# Patient Record
Sex: FEMALE | Race: BLACK OR AFRICAN AMERICAN | NOT HISPANIC OR LATINO | URBAN - METROPOLITAN AREA
[De-identification: names, ages, dates, MRNs, and addresses within clinical notes are randomized per-mention and may not be internally consistent; named-entity substitution may affect disease eponyms.]

---

## 2019-02-24 ENCOUNTER — INPATIENT (INPATIENT)
Facility: HOSPITAL | Age: 69
LOS: 2 days | Discharge: HOME | End: 2019-02-27
Attending: STUDENT IN AN ORGANIZED HEALTH CARE EDUCATION/TRAINING PROGRAM | Admitting: STUDENT IN AN ORGANIZED HEALTH CARE EDUCATION/TRAINING PROGRAM
Payer: COMMERCIAL

## 2019-02-24 VITALS
OXYGEN SATURATION: 99 % | TEMPERATURE: 98 F | RESPIRATION RATE: 16 BRPM | HEART RATE: 104 BPM | SYSTOLIC BLOOD PRESSURE: 156 MMHG | DIASTOLIC BLOOD PRESSURE: 81 MMHG

## 2019-02-24 DIAGNOSIS — R55 SYNCOPE AND COLLAPSE: ICD-10-CM

## 2019-02-24 LAB
ALBUMIN SERPL ELPH-MCNC: 3.7 G/DL — SIGNIFICANT CHANGE UP (ref 3.3–5)
ALP SERPL-CCNC: 65 U/L — SIGNIFICANT CHANGE UP (ref 40–120)
ALT FLD-CCNC: 3 U/L — LOW (ref 4–33)
ANION GAP SERPL CALC-SCNC: 15 MMO/L — HIGH (ref 7–14)
APPEARANCE UR: SIGNIFICANT CHANGE UP
APTT BLD: 27.1 SEC — LOW (ref 27.5–36.3)
AST SERPL-CCNC: 13 U/L — SIGNIFICANT CHANGE UP (ref 4–32)
BACTERIA # UR AUTO: HIGH
BASOPHILS # BLD AUTO: 0.02 K/UL — SIGNIFICANT CHANGE UP (ref 0–0.2)
BASOPHILS NFR BLD AUTO: 0.2 % — SIGNIFICANT CHANGE UP (ref 0–2)
BILIRUB SERPL-MCNC: 0.2 MG/DL — SIGNIFICANT CHANGE UP (ref 0.2–1.2)
BILIRUB UR-MCNC: NEGATIVE — SIGNIFICANT CHANGE UP
BLD GP AB SCN SERPL QL: NEGATIVE — SIGNIFICANT CHANGE UP
BLOOD UR QL VISUAL: HIGH
BUN SERPL-MCNC: 14 MG/DL — SIGNIFICANT CHANGE UP (ref 7–23)
CALCIUM SERPL-MCNC: 10 MG/DL — SIGNIFICANT CHANGE UP (ref 8.4–10.5)
CHLORIDE SERPL-SCNC: 106 MMOL/L — SIGNIFICANT CHANGE UP (ref 98–107)
CO2 SERPL-SCNC: 23 MMOL/L — SIGNIFICANT CHANGE UP (ref 22–31)
COLOR SPEC: YELLOW — SIGNIFICANT CHANGE UP
CREAT SERPL-MCNC: 1.04 MG/DL — SIGNIFICANT CHANGE UP (ref 0.5–1.3)
EOSINOPHIL # BLD AUTO: 0.05 K/UL — SIGNIFICANT CHANGE UP (ref 0–0.5)
EOSINOPHIL NFR BLD AUTO: 0.5 % — SIGNIFICANT CHANGE UP (ref 0–6)
EPI CELLS # UR: SIGNIFICANT CHANGE UP
GLUCOSE SERPL-MCNC: 93 MG/DL — SIGNIFICANT CHANGE UP (ref 70–99)
GLUCOSE UR-MCNC: NEGATIVE — SIGNIFICANT CHANGE UP
HCT VFR BLD CALC: 26.5 % — LOW (ref 34.5–45)
HGB BLD-MCNC: 7.7 G/DL — LOW (ref 11.5–15.5)
IMM GRANULOCYTES NFR BLD AUTO: 0.8 % — SIGNIFICANT CHANGE UP (ref 0–1.5)
INR BLD: 1.28 — HIGH (ref 0.88–1.17)
KETONES UR-MCNC: SIGNIFICANT CHANGE UP
LEUKOCYTE ESTERASE UR-ACNC: HIGH
LYMPHOCYTES # BLD AUTO: 1.68 K/UL — SIGNIFICANT CHANGE UP (ref 1–3.3)
LYMPHOCYTES # BLD AUTO: 15.9 % — SIGNIFICANT CHANGE UP (ref 13–44)
MCHC RBC-ENTMCNC: 23.1 PG — LOW (ref 27–34)
MCHC RBC-ENTMCNC: 29.1 % — LOW (ref 32–36)
MCV RBC AUTO: 79.3 FL — LOW (ref 80–100)
MONOCYTES # BLD AUTO: 0.73 K/UL — SIGNIFICANT CHANGE UP (ref 0–0.9)
MONOCYTES NFR BLD AUTO: 6.9 % — SIGNIFICANT CHANGE UP (ref 2–14)
NEUTROPHILS # BLD AUTO: 8 K/UL — HIGH (ref 1.8–7.4)
NEUTROPHILS NFR BLD AUTO: 75.7 % — SIGNIFICANT CHANGE UP (ref 43–77)
NITRITE UR-MCNC: POSITIVE — SIGNIFICANT CHANGE UP
NRBC # FLD: 0 K/UL — LOW (ref 25–125)
PH UR: 6 — SIGNIFICANT CHANGE UP (ref 5–8)
PLATELET # BLD AUTO: 525 K/UL — HIGH (ref 150–400)
PMV BLD: 9.6 FL — SIGNIFICANT CHANGE UP (ref 7–13)
POTASSIUM SERPL-MCNC: 3.8 MMOL/L — SIGNIFICANT CHANGE UP (ref 3.5–5.3)
POTASSIUM SERPL-SCNC: 3.8 MMOL/L — SIGNIFICANT CHANGE UP (ref 3.5–5.3)
PROT SERPL-MCNC: 8.5 G/DL — HIGH (ref 6–8.3)
PROT UR-MCNC: 100 — HIGH
PROTHROM AB SERPL-ACNC: 14.7 SEC — HIGH (ref 9.8–13.1)
RBC # BLD: 3.34 M/UL — LOW (ref 3.8–5.2)
RBC # FLD: 18.6 % — HIGH (ref 10.3–14.5)
RBC CASTS # UR COMP ASSIST: >50 — HIGH (ref 0–?)
RH IG SCN BLD-IMP: POSITIVE — SIGNIFICANT CHANGE UP
RH IG SCN BLD-IMP: POSITIVE — SIGNIFICANT CHANGE UP
SODIUM SERPL-SCNC: 144 MMOL/L — SIGNIFICANT CHANGE UP (ref 135–145)
SP GR SPEC: 1.02 — SIGNIFICANT CHANGE UP (ref 1–1.04)
UROBILINOGEN FLD QL: NORMAL — SIGNIFICANT CHANGE UP
WBC # BLD: 10.56 K/UL — HIGH (ref 3.8–10.5)
WBC # FLD AUTO: 10.56 K/UL — HIGH (ref 3.8–10.5)
WBC UR QL: HIGH (ref 0–?)

## 2019-02-24 PROCEDURE — 74174 CTA ABD&PLVS W/CONTRAST: CPT | Mod: 26

## 2019-02-24 RX ORDER — SODIUM CHLORIDE 9 MG/ML
1000 INJECTION, SOLUTION INTRAVENOUS ONCE
Qty: 0 | Refills: 0 | Status: COMPLETED | OUTPATIENT
Start: 2019-02-24 | End: 2019-02-24

## 2019-02-24 RX ORDER — CEFTRIAXONE 500 MG/1
1 INJECTION, POWDER, FOR SOLUTION INTRAMUSCULAR; INTRAVENOUS ONCE
Qty: 0 | Refills: 0 | Status: COMPLETED | OUTPATIENT
Start: 2019-02-24 | End: 2019-02-24

## 2019-02-24 RX ORDER — ACETAMINOPHEN 500 MG
650 TABLET ORAL ONCE
Qty: 0 | Refills: 0 | Status: COMPLETED | OUTPATIENT
Start: 2019-02-24 | End: 2019-02-24

## 2019-02-24 RX ADMIN — CEFTRIAXONE 100 GRAM(S): 500 INJECTION, POWDER, FOR SOLUTION INTRAMUSCULAR; INTRAVENOUS at 21:13

## 2019-02-24 RX ADMIN — SODIUM CHLORIDE 1000 MILLILITER(S): 9 INJECTION, SOLUTION INTRAVENOUS at 19:45

## 2019-02-24 NOTE — ED PROVIDER NOTE - CLINICAL SUMMARY MEDICAL DECISION MAKING FREE TEXT BOX
67yo f w hx fibroids, daily smoker, otherwise no pmh here for syncopal episode. Appears well w nl ekg, no cardiac hx. Most likely related to low Hb in setting of known fibroids recently, however given 67yo pt hypertensive here w abd pain + tenderness a/w syncope, will check CTA abd to r/o AAA, also labs, hydrate, r/a 67yo f w hx fibroids, daily smoker, otherwise no pmh here for syncopal episode. Appears well w nl ekg, no cardiac hx. Most likely related to low Hb in setting of known fibroids recently, however given 67yo pt hypertensive here w abd pain + tenderness a/w syncope, will check CTA abd to r/o AAA, also labs, hydrate, r/a  See progress notes for more of clinical course

## 2019-02-24 NOTE — ED PROVIDER NOTE - PROGRESS NOTE DETAILS
Elizabeth Goldberger PGY-2: Hb 7.7, however pt tachycardic to 110s and had syncopal episode likely related to blood loss. As such will transfuse 1 u and adm. Also put call out to pt's gyn Sivakumar, awaiting callback Elizabeth Goldberger PGY-2: CTA showing suggestion of malignant process of uterus and/or cervix, not fibroids as pt had previously thought. When asked pt state never had formal dx fibroids but was told sx likely 2/2 to that pending formal diagnosis. Discussed findings of ct w pt

## 2019-02-24 NOTE — ED PROVIDER NOTE - CARE PLAN
Principal Discharge DX:	Syncope  Secondary Diagnosis:	UTI (urinary tract infection)  Secondary Diagnosis:	Anemia

## 2019-02-24 NOTE — ED PROVIDER NOTE - OBJECTIVE STATEMENT
68f w hx fibroids here for syncopal episode. Pt was standing at a restaurant after not having eaten much during the day when felt suddenly lightheaded and lost consciousness. Was witnessed by family, lasted only a few seconds. Fell down but w/o head trauma. No preceding cp/sob, no subsequent h/a, visual changes or vomiting. No previous hx syncope. No cardiac hx. Earlier today visited gyn to follow up fibroids, was told had low Hb ~8. States has been bleeding ~1 pad daily for past month w/o recent increase in bleeding. No dysuria or hematuria, no bleeding from other sites. Has also had abd pain recently which has been attributed to her fibroid, still present currently, diffusely throughout abd. Has been taking norethindrone for last 10 days w/o obvious effect. No n/v/d, no fever, no surg hx. Has not had any recent abd/pelvic imaging. 68f w hx fibroids here for syncopal episode. Pt was standing at a restaurant after not having eaten much during the day when felt suddenly lightheaded and lost consciousness. Was witnessed by family, lasted only a few seconds. Fell down but w/o head trauma. No preceding cp/sob, no subsequent h/a, visual changes or vomiting. No previous hx syncope. No cardiac hx. Earlier today visited gyn to follow up fibroids, was told had low Hb ~8. States has been bleeding ~1 pad daily for past month w/o recent increase in bleeding. No dysuria or hematuria, no bleeding from other sites. Has also had abd pain recently which has been attributed to her fibroid, still present currently, diffusely throughout abd. Has been taking norethindrone for last 10 days w/o obvious effect. No n/v/d, no fever, no surg hx. Has not had any recent abd/pelvic imaging.    PMJULIANN @ [facility]  gyn- Mamie Shaver

## 2019-02-24 NOTE — ED ADULT NURSE NOTE - CHIEF COMPLAINT QUOTE
Pt from [facility] restaurant, c/o witnessed syncopal episode.  Pt denies head trauma, mild lower abdominal discomfort.  PMHx fibroids.

## 2019-02-24 NOTE — ED ADULT NURSE NOTE - OBJECTIVE STATEMENT
pt received in room, 27, A&Ox3, c/o syncopal episode at restaurant. according to pt she felt lightheaded & slid down to the floor with the help of restaurant patrons, pt denies head trauma. pt states shes anemic and has been bleeding vaginally due to fibroids x1 month, going through 1-2 depends diapers a day. pt states she has not eaten or drank anything all day today, pt breathing even and unlabored, denies n/v/d, difficulty breathing, dizziness, headache, fever, cough, chills, numbness, pt has equal strength bilaterally in Upper & lower extremities, neg facial droop, neg slurred speech. 20G I Vplaced in L AC, labs sent, NSR on CM, will continue to monitor.

## 2019-02-24 NOTE — ED ADULT TRIAGE NOTE - CHIEF COMPLAINT QUOTE
Pt from [facility] restaurant, c/o witnessed syncopal episode.  Pt denies head trauma, mild lower abdominal discomfort.  PMHx fibroids.

## 2019-02-24 NOTE — ED ADULT NURSE NOTE - NSIMPLEMENTINTERV_GEN_ALL_ED
Implemented All Fall Risk Interventions:  [place] to call system. Call bell, personal items and telephone within reach. Instruct patient to call for assistance. Room bathroom lighting operational. Non-slip footwear when patient is off stretcher. Physically safe environment: no spills, clutter or unnecessary equipment. Stretcher in lowest position, wheels locked, appropriate side rails in place. Provide visual cue, wrist band, yellow gown, etc. Monitor gait and stability. Monitor for mental status changes and reorient to person, place, and time. Review medications for side effects contributing to fall risk. Reinforce activity limits and safety measures with patient and family.

## 2019-02-24 NOTE — ED PROVIDER NOTE - ATTENDING CONTRIBUTION TO CARE
Gong: I have seen and examined the patient face to face, have reviewed and addended the HPI, PE and a/p as necessary.     67 yo F  h/o fibroids, a/w syncope at a restaurant.  Pt reported feeling lightheaded and lost consciousness witnessed by family, no head trauma, no cp, sob, headache, vomiting.  Pt noted to have anemia with dysfunction uterine bleeding.  Has been taking norethindrone for last 10 days w/o obvious effect. No n/v/d, no fever, no surg hx. Has not had any recent abd/pelvic imaging.    GEN - NAD; well appearing; A+O x3; non-toxic appearing CARD -s1s2, RRR, no M,G,R; PULM - CTA b/l, symmetric breath sounds; ABD:  +BS, TTP in LLQ, no guarding, no rebound, no masses; BACK: no CVA tenderness, Normal  spine; EXT: symmetric pulses, 2+ dp, capillary refill < 2 seconds, no clubbing, no cyanosis, no edema     67 yo F a/w syncope possibly related to anemia, with LLQ pain, EKG unremarkable for syncope, with persistent tachycardia, will ct abd pelvis to eval LLQ pain, check cbc to assess for worsening anemia.    Likely admit for syncope work up, follow up ct scan.

## 2019-02-25 DIAGNOSIS — R55 SYNCOPE AND COLLAPSE: ICD-10-CM

## 2019-02-25 DIAGNOSIS — R93.89 ABNORMAL FINDINGS ON DIAGNOSTIC IMAGING OF OTHER SPECIFIED BODY STRUCTURES: ICD-10-CM

## 2019-02-25 DIAGNOSIS — N30.01 ACUTE CYSTITIS WITH HEMATURIA: ICD-10-CM

## 2019-02-25 DIAGNOSIS — N39.0 URINARY TRACT INFECTION, SITE NOT SPECIFIED: ICD-10-CM

## 2019-02-25 DIAGNOSIS — D50.9 IRON DEFICIENCY ANEMIA, UNSPECIFIED: ICD-10-CM

## 2019-02-25 DIAGNOSIS — D47.3 ESSENTIAL (HEMORRHAGIC) THROMBOCYTHEMIA: ICD-10-CM

## 2019-02-25 DIAGNOSIS — Z29.9 ENCOUNTER FOR PROPHYLACTIC MEASURES, UNSPECIFIED: ICD-10-CM

## 2019-02-25 DIAGNOSIS — N93.9 ABNORMAL UTERINE AND VAGINAL BLEEDING, UNSPECIFIED: ICD-10-CM

## 2019-02-25 DIAGNOSIS — R59.0 LOCALIZED ENLARGED LYMPH NODES: ICD-10-CM

## 2019-02-25 DIAGNOSIS — Z98.890 OTHER SPECIFIED POSTPROCEDURAL STATES: Chronic | ICD-10-CM

## 2019-02-25 LAB
ANION GAP SERPL CALC-SCNC: 14 MMO/L — SIGNIFICANT CHANGE UP (ref 7–14)
BASOPHILS # BLD AUTO: 0.03 K/UL — SIGNIFICANT CHANGE UP (ref 0–0.2)
BASOPHILS NFR BLD AUTO: 0.4 % — SIGNIFICANT CHANGE UP (ref 0–2)
BUN SERPL-MCNC: 13 MG/DL — SIGNIFICANT CHANGE UP (ref 7–23)
CALCIUM SERPL-MCNC: 9.4 MG/DL — SIGNIFICANT CHANGE UP (ref 8.4–10.5)
CEA SERPL-MCNC: 1.1 NG/ML — SIGNIFICANT CHANGE UP (ref 1–3.8)
CHLORIDE SERPL-SCNC: 104 MMOL/L — SIGNIFICANT CHANGE UP (ref 98–107)
CO2 SERPL-SCNC: 25 MMOL/L — SIGNIFICANT CHANGE UP (ref 22–31)
CREAT SERPL-MCNC: 1.06 MG/DL — SIGNIFICANT CHANGE UP (ref 0.5–1.3)
EOSINOPHIL # BLD AUTO: 0.1 K/UL — SIGNIFICANT CHANGE UP (ref 0–0.5)
EOSINOPHIL NFR BLD AUTO: 1.2 % — SIGNIFICANT CHANGE UP (ref 0–6)
FERRITIN SERPL-MCNC: 41.18 NG/ML — SIGNIFICANT CHANGE UP (ref 15–150)
GLUCOSE SERPL-MCNC: 115 MG/DL — HIGH (ref 70–99)
HCT VFR BLD CALC: 26.7 % — LOW (ref 34.5–45)
HCV AB S/CO SERPL IA: 0.09 S/CO — SIGNIFICANT CHANGE UP (ref 0–0.79)
HCV AB SERPL-IMP: SIGNIFICANT CHANGE UP
HGB BLD-MCNC: 8 G/DL — LOW (ref 11.5–15.5)
IMM GRANULOCYTES NFR BLD AUTO: 0.4 % — SIGNIFICANT CHANGE UP (ref 0–1.5)
IRON SATN MFR SERPL: 18 UG/DL — LOW (ref 30–160)
IRON SATN MFR SERPL: 248 UG/DL — SIGNIFICANT CHANGE UP (ref 140–530)
LACTATE SERPL-SCNC: 1.4 MMOL/L — SIGNIFICANT CHANGE UP (ref 0.5–2)
LYMPHOCYTES # BLD AUTO: 2.02 K/UL — SIGNIFICANT CHANGE UP (ref 1–3.3)
LYMPHOCYTES # BLD AUTO: 24.2 % — SIGNIFICANT CHANGE UP (ref 13–44)
MAGNESIUM SERPL-MCNC: 1.9 MG/DL — SIGNIFICANT CHANGE UP (ref 1.6–2.6)
MCHC RBC-ENTMCNC: 23.7 PG — LOW (ref 27–34)
MCHC RBC-ENTMCNC: 30 % — LOW (ref 32–36)
MCV RBC AUTO: 79.2 FL — LOW (ref 80–100)
MONOCYTES # BLD AUTO: 0.67 K/UL — SIGNIFICANT CHANGE UP (ref 0–0.9)
MONOCYTES NFR BLD AUTO: 8 % — SIGNIFICANT CHANGE UP (ref 2–14)
NEUTROPHILS # BLD AUTO: 5.48 K/UL — SIGNIFICANT CHANGE UP (ref 1.8–7.4)
NEUTROPHILS NFR BLD AUTO: 65.8 % — SIGNIFICANT CHANGE UP (ref 43–77)
NRBC # FLD: 0 K/UL — LOW (ref 25–125)
PHOSPHATE SERPL-MCNC: 3.2 MG/DL — SIGNIFICANT CHANGE UP (ref 2.5–4.5)
PLATELET # BLD AUTO: 433 K/UL — HIGH (ref 150–400)
PMV BLD: 9 FL — SIGNIFICANT CHANGE UP (ref 7–13)
POTASSIUM SERPL-MCNC: 3.6 MMOL/L — SIGNIFICANT CHANGE UP (ref 3.5–5.3)
POTASSIUM SERPL-SCNC: 3.6 MMOL/L — SIGNIFICANT CHANGE UP (ref 3.5–5.3)
RBC # BLD: 3.37 M/UL — LOW (ref 3.8–5.2)
RBC # FLD: 17.5 % — HIGH (ref 10.3–14.5)
SODIUM SERPL-SCNC: 143 MMOL/L — SIGNIFICANT CHANGE UP (ref 135–145)
TSH SERPL-MCNC: 1.56 UIU/ML — SIGNIFICANT CHANGE UP (ref 0.27–4.2)
UIBC SERPL-MCNC: 229.8 UG/DL — SIGNIFICANT CHANGE UP (ref 110–370)
WBC # BLD: 8.33 K/UL — SIGNIFICANT CHANGE UP (ref 3.8–10.5)
WBC # FLD AUTO: 8.33 K/UL — SIGNIFICANT CHANGE UP (ref 3.8–10.5)

## 2019-02-25 PROCEDURE — 99222 1ST HOSP IP/OBS MODERATE 55: CPT | Mod: GC

## 2019-02-25 PROCEDURE — 76705 ECHO EXAM OF ABDOMEN: CPT | Mod: 26

## 2019-02-25 PROCEDURE — 12345: CPT | Mod: NC

## 2019-02-25 PROCEDURE — 99223 1ST HOSP IP/OBS HIGH 75: CPT

## 2019-02-25 RX ORDER — CEFTRIAXONE 500 MG/1
1 INJECTION, POWDER, FOR SOLUTION INTRAMUSCULAR; INTRAVENOUS EVERY 24 HOURS
Qty: 0 | Refills: 0 | Status: DISCONTINUED | OUTPATIENT
Start: 2019-02-25 | End: 2019-02-27

## 2019-02-25 RX ADMIN — CEFTRIAXONE 100 GRAM(S): 500 INJECTION, POWDER, FOR SOLUTION INTRAMUSCULAR; INTRAVENOUS at 21:48

## 2019-02-25 RX ADMIN — Medication 650 MILLIGRAM(S): at 00:25

## 2019-02-25 RX ADMIN — Medication 650 MILLIGRAM(S): at 01:29

## 2019-02-25 NOTE — H&P ADULT - PROBLEM SELECTOR PLAN 5
in setting of DUB x3mo.  s/p 1unit pRBC in ED  repeat CBC  w/u for endometrial thickening as above  due for repeat screening colonoscopy as well, last done in 80s  will check routine FOBT likely reactive  monitor/trend

## 2019-02-25 NOTE — H&P ADULT - PROBLEM SELECTOR PLAN 2
gyn consult for possible inpt endometrial bx, further w/u for concern for malignancy gyn consulted for possible inpt endometrial bx, further w/u for concern for malignancy

## 2019-02-25 NOTE — CONSULT NOTE ADULT - SUBJECTIVE AND OBJECTIVE BOX
HPI: 68y  postmenopausal since 54y/o admitted after syncopal episode. Leading up to event, pt noted that she has had VB x 3 months duration (3 PPD, decreased to 1PPD on admission). Pt had not seen GYN for >2yrs prior to this but went to new gyn (Dr. Shaver) yesterday. At office, pt had PAP smear. After, pt went to get food and had the syncopal episode while waiting in line. She denies any head trauma from episode. She denies any h/o such episodes, denies any cardiac or neurologic history. She notes, however, that she has been having on/off VB since menopause but that bleeding usually resolved spontaneously. She denies any current SOB, CP, n/v, fever/chills. No other complaints at this time.     OB/GYN HISTORY:      OBGYN:  x 1, denies fibroids, cysts, abn PAP, endometriosis, STIs  PMH: denies  PSH: denies  Meds: denies  Allergies: NKDA  Social: smoker - 4cigarettes/day x 35yrs, denies alcohol/illicit drug use  FHx: denies h/o colon, breast, ovarian, uterine cancer    ROS wnl, except as per HPI    Vital Signs Last 24 Hrs  T(C): 36.8 (2019 12:24), Max: 37.2 (2019 23:55)  T(F): 98.3 (2019 12:24), Max: 98.9 (2019 23:55)  HR: 86 (2019 12:24) (85 - 102)  BP: 152/103 (2019 12:24) (143/79 - 194/87)  RR: 18 (2019 12:24) (16 - 20)  SpO2: 97% (2019 12:24) (97% - 100%)    Physical Exam:  Gen:AAOx3, NAD  CV: RRR  Pulm:CTAB/L  Abd: soft, NT, tender suprapubically  Gyn: deferred at this time as pt in hallway/waiting for room  Ext: NTB/L    LABS:                        8.0    8.33  )-----------( 433      ( 2019 02:58 )             26.7     02-25    143  |  104  |  13  ----------------------------<  115<H>  3.6   |  25  |  1.06    Ca    9.4      2019 02:58  Phos  3.2       Mg     1.9         TPro  8.5<H>  /  Alb  3.7  /  TBili  0.2  /  DBili  x   /  AST  13  /  ALT  3<L>  /  AlkPhos  65      PT/INR - ( 2019 19:30 )   PT: 14.7 SEC;   INR: 1.28          PTT - ( 2019 19:30 )  PTT:27.1 SEC  Urinalysis Basic - ( 2019 20:00 )    Color: YELLOW / Appearance: TURBID / S.024 / pH: 6.0  Gluc: NEGATIVE / Ketone: TRACE  / Bili: NEGATIVE / Urobili: NORMAL   Blood: LARGE / Protein: 100 / Nitrite: POSITIVE   Leuk Esterase: LARGE / RBC: >50 / WBC 26-50   Sq Epi: x / Non Sq Epi: FEW / Bacteria: MODERATE        RADIOLOGY & ADDITIONAL STUDIES:    EXAM:  CT ANGIO ABD PELV (W)AW IC        PROCEDURE DATE:  2019         INTERPRETATION:  CLINICAL INFORMATION: Syncope with abdominal pain,   concern for abdominal aortic aneurysm      COMPARISON: None    PROCEDURE:   CT Angiography of the Abdomen and Pelvis.  Arterial phase images were acquired.  Intravenous contrast: 90 ml Omnipaque 350. 10 ml discarded.  Oral contrast: None.  Sagittal and coronal reformats were performed as well as 3D (MIP)   reconstructions.    FINDINGS:    LOWER CHEST: Within normal limits.    LIVER: Subcentimeter hypodense foci in the right hepatic lobe, too small   to characterize.  BILE DUCTS: Normal caliber.  GALLBLADDER: There is focal thickening of the gallbladder wall the   fundus..  SPLEEN: Within normal limits.  PANCREAS: Within normal limits.  ADRENALS: Within normal limits.  KIDNEYS/URETERS: Within normal limits.    BLADDER: Collapsed.  REPRODUCTIVE ORGANS: Heterogeneous endometrium distended to proximal 6.7   cm.   BOWEL: No bowel obstruction. Appendix is normal.  PERITONEUM: No ascites.  VESSELS:  Mild atherosclerotic calcification of the aortoiliac tree.  No   abdominal aortic aneurysm or dissection.  The celiac access, superior   mesenteric artery, bilateral main renal arteries and inferiormesenteric   artery are patent.  The pelvic and proximal thigh vasculature is patent   without flow-limiting stenosis.  Incidentally noted the SMA arises   directly off the celiac trunk..  RETROPERITONEUM: Bulky retroperitoneal and pelvic lymphadenopathy. A   right retroperitoneal node measures 2.1 x 1.8 cm (series 2, image 55). A   right pelvic sidewall lymph node measures 3.2 x 2.7 cm (series 2, image   85). A right pelvic/perirectal lymph node measures 2.3 x 2.0 cm (series   2, image 94).   ABDOMINAL WALL: Within normal limits.  BONES: Degenerative changes.    IMPRESSION:   No abdominal aortic aneurysm or dissection.    Heterogeneous endometrium distended to approximately 6.7 cm.  The   findings could represent cervical carcinoma with complex fluid in the   endometrial canal versus endometrial carcinoma.  Extensive pelvic and   retroperitoneal lymphadenopathy consistent with metastatic disease.    Focal thickening of the gallbladder wall at the fundus may reflect   adenomyomatosis.  Gallbladder ultrasound is recommended to exclude   suspicious lesion.              SUSAN MILLER M.D., RADIOLOGY RESIDENT  This document has been electronically signed.  KALYN MONTANEZ M.D.,ATTENDING RADIOLOGIST  This document has been electronically signed. 2019 10:21PM HPI: 68y  postmenopausal since 54y/o admitted after syncopal episode. Leading up to event, pt noted that she has had VB x 3 months duration (3 PPD, decreased to 1PPD on admission). Pt had not seen GYN for >2yrs prior to this but went to new gyn (Dr. Shaver) yesterday. At office, pt had PAP smear. After, pt went to get food and had the syncopal episode while waiting in line. She denies any head trauma from episode. She denies any h/o such episodes, denies any cardiac or neurologic history. She notes, however, that she has been having on/off VB since menopause but that bleeding usually resolved spontaneously. She denies any current SOB, CP, n/v, fever/chills. Denies recent unintentional weight loss, back pain, leg swelling. No other complaints at this time.     OB/GYN HISTORY:      OBGYN:  x 1, denies fibroids, cysts, abn PAP, endometriosis, STIs  PMH: denies  PSH: denies  Meds: denies  Allergies: NKDA  Social: smoker - 4cigarettes/day x 35yrs, denies alcohol/illicit drug use  FHx: denies h/o colon, breast, ovarian, uterine cancer    ROS wnl, except as per HPI    Vital Signs Last 24 Hrs  T(C): 36.8 (2019 12:24), Max: 37.2 (2019 23:55)  T(F): 98.3 (2019 12:24), Max: 98.9 (2019 23:55)  HR: 86 (2019 12:24) (85 - 102)  BP: 152/103 (2019 12:24) (143/79 - 194/87)  RR: 18 (2019 12:24) (16 - 20)  SpO2: 97% (2019 12:24) (97% - 100%)    Physical Exam:  Gen:AAOx3, NAD  CV: RRR  Pulm:CTAB/L  Abd: soft, NT, tender suprapubically with palpation  Gyn: deferred at this time as pt in hallway/waiting for room  Ext: NTB/L    LABS:                        8.0    8.33  )-----------( 433      ( 2019 02:58 )             26.7     02-    143  |  104  |  13  ----------------------------<  115<H>  3.6   |  25  |  1.06    Ca    9.4      2019 02:58  Phos  3.2     02-  Mg     1.9         TPro  8.5<H>  /  Alb  3.7  /  TBili  0.2  /  DBili  x   /  AST  13  /  ALT  3<L>  /  AlkPhos  65      PT/INR - ( 2019 19:30 )   PT: 14.7 SEC;   INR: 1.28          PTT - ( 2019 19:30 )  PTT:27.1 SEC  Urinalysis Basic - ( 2019 20:00 )    Color: YELLOW / Appearance: TURBID / S.024 / pH: 6.0  Gluc: NEGATIVE / Ketone: TRACE  / Bili: NEGATIVE / Urobili: NORMAL   Blood: LARGE / Protein: 100 / Nitrite: POSITIVE   Leuk Esterase: LARGE / RBC: >50 / WBC 26-50   Sq Epi: x / Non Sq Epi: FEW / Bacteria: MODERATE        RADIOLOGY & ADDITIONAL STUDIES:    EXAM:  CT ANGIO ABD PELV (W)AW IC        PROCEDURE DATE:  2019         INTERPRETATION:  CLINICAL INFORMATION: Syncope with abdominal pain,   concern for abdominal aortic aneurysm      COMPARISON: None    PROCEDURE:   CT Angiography of the Abdomen and Pelvis.  Arterial phase images were acquired.  Intravenous contrast: 90 ml Omnipaque 350. 10 ml discarded.  Oral contrast: None.  Sagittal and coronal reformats were performed as well as 3D (MIP)   reconstructions.    FINDINGS:    LOWER CHEST: Within normal limits.    LIVER: Subcentimeter hypodense foci in the right hepatic lobe, too small   to characterize.  BILE DUCTS: Normal caliber.  GALLBLADDER: There is focal thickening of the gallbladder wall the   fundus..  SPLEEN: Within normal limits.  PANCREAS: Within normal limits.  ADRENALS: Within normal limits.  KIDNEYS/URETERS: Within normal limits.    BLADDER: Collapsed.  REPRODUCTIVE ORGANS: Heterogeneous endometrium distended to proximal 6.7   cm.   BOWEL: No bowel obstruction. Appendix is normal.  PERITONEUM: No ascites.  VESSELS:  Mild atherosclerotic calcification of the aortoiliac tree.  No   abdominal aortic aneurysm or dissection.  The celiac access, superior   mesenteric artery, bilateral main renal arteries and inferiormesenteric   artery are patent.  The pelvic and proximal thigh vasculature is patent   without flow-limiting stenosis.  Incidentally noted the SMA arises   directly off the celiac trunk..  RETROPERITONEUM: Bulky retroperitoneal and pelvic lymphadenopathy. A   right retroperitoneal node measures 2.1 x 1.8 cm (series 2, image 55). A   right pelvic sidewall lymph node measures 3.2 x 2.7 cm (series 2, image   85). A right pelvic/perirectal lymph node measures 2.3 x 2.0 cm (series   2, image 94).   ABDOMINAL WALL: Within normal limits.  BONES: Degenerative changes.    IMPRESSION:   No abdominal aortic aneurysm or dissection.    Heterogeneous endometrium distended to approximately 6.7 cm.  The   findings could represent cervical carcinoma with complex fluid in the   endometrial canal versus endometrial carcinoma.  Extensive pelvic and   retroperitoneal lymphadenopathy consistent with metastatic disease.    Focal thickening of the gallbladder wall at the fundus may reflect   adenomyomatosis.  Gallbladder ultrasound is recommended to exclude   suspicious lesion.              SUSAN MILLER M.D., RADIOLOGY RESIDENT  This document has been electronically signed.  KALYN MONTANEZ M.D.,ATTENDING RADIOLOGIST  This document has been electronically signed. 2019 10:21PM

## 2019-02-25 NOTE — H&P ADULT - PROBLEM SELECTOR PLAN 4
asymptomatic, s/p CTX in ED in setting of DUB x3mo.  s/p 1unit pRBC in ED  repeat CBC  w/u for endometrial thickening as above  due for repeat screening colonoscopy as well, last done in 80s  will check routine FOBT

## 2019-02-25 NOTE — PROGRESS NOTE ADULT - PROBLEM SELECTOR PLAN 1
Likely due to symptomatic anemia from metastatic gyn malignancy of unclear etiology  s/p 1unit pRBC, repeat CBC with likely adequate response to 1unit pRBC with component of hemoconcentration on first set of labs (s/p IVF in ED)  gyn consult called  fall precautions  check orthostatics  serial CBCs

## 2019-02-25 NOTE — H&P ADULT - NSHPLABSRESULTS_GEN_ALL_CORE
144  |  106  |  14  ----------------------------<  93  3.8   |  23  |  1.04    Ca    10.0      2019 18:40    TPro  8.5<H>  /  Alb  3.7  /  TBili  0.2  /  DBili  x   /  AST  13  /  ALT  3<L>  /  AlkPhos  65                          7.7    10.56 )-----------( 525      ( 2019 18:40 )             26.5     PT/INR - ( 2019 19:30 )   PT: 14.7 SEC;   INR: 1.28     PTT - ( 2019 19:30 )  PTT:27.1 SEC    Urinalysis Basic - ( 2019 20:00 )  Color: YELLOW / Appearance: TURBID / S.024 / pH: 6.0  Gluc: NEGATIVE / Ketone: TRACE  / Bili: NEGATIVE / Urobili: NORMAL   Blood: LARGE / Protein: 100 / Nitrite: POSITIVE   Leuk Esterase: LARGE / RBC: >50 / WBC 26-50   Sq Epi: x / Non Sq Epi: FEW / Bacteria: MODERATE    < from: CT Angio Abdomen and Pelvis w/ IV Cont (19 @ 20:31) >  LOWER CHEST: Within normal limits.  LIVER: Subcentimeter hypodense foci in the right hepatic lobe, too small to characterize.  BILE DUCTS: Normal caliber.  GALLBLADDER: There is focal thickening of the gallbladder wall the fundus.  SPLEEN: Within normal limits.  PANCREAS: Within normal limits.  ADRENALS: Within normal limits.  KIDNEYS/URETERS: Within normal limits.  BLADDER: Collapsed.  REPRODUCTIVE ORGANS: Heterogeneous endometrium distended to proximal 6.7 cm.   BOWEL: No bowel obstruction. Appendix is normal.  PERITONEUM: No ascites.  VESSELS:  Mild atherosclerotic calcification of the aortoiliac tree.  No abdominal aortic aneurysm or dissection.  The celiac access, superior   mesenteric artery, bilateral main renal arteries and inferiormesenteric artery are patent.  The pelvic and proximal thigh vasculature is patent   without flow-limiting stenosis.  Incidentally noted the SMA arises directly off the celiac trunk.  RETROPERITONEUM: Bulky retroperitoneal and pelvic lymphadenopathy. A right retroperitoneal node measures 2.1 x 1.8 cm (series 2, image 55). A right pelvic sidewall lymph node measures 3.2 x 2.7 cm (series 2, image 85). A right pelvic/perirectal lymph node measures 2.3 x 2.0 cm (series 2, image 94).   ABDOMINAL WALL: Within normal limits.  BONES: Degenerative changes.  IMPRESSION: No abdominal aortic aneurysm or dissection. Heterogeneous endometrium distended to approximately 6.7 cm.  The findings could represent cervical carcinoma with complex fluid in the endometrial canal versus endometrial carcinoma.  Extensive pelvic and   retroperitoneal lymphadenopathy consistent with metastatic disease. Focal thickening of the gallbladder wall at the fundus may reflect   adenomyomatosis.  Gallbladder ultrasound is recommended to exclude suspicious lesion.  < end of copied text >    EKG personally reviewed - 99bpm NSR, TWI V1; inverted p in V1-V2; QTc 474ms

## 2019-02-25 NOTE — H&P ADULT - NSHPSOCIALHISTORY_GEN_ALL_CORE
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

## 2019-02-25 NOTE — H&P ADULT - PROBLEM SELECTOR PLAN 6
likely reactive  monitor/trend asymptomatic, s/p CTX in ED  monitor off abx for now, f/u UCx, treat if positive

## 2019-02-25 NOTE — CONSULT NOTE ADULT - PROBLEM SELECTOR RECOMMENDATION 9
-GYN exam/cervical biopsy (performed by gyn onc team), follow up pathology  -CT chest to r/o mets  -med onc consult  -rad onc consult  -gyn onc will follow    Pt seen, examined and counseled with Dr. Marcellus Gaona, pgy3 -GYN exam/cervical biopsy (performed by gyn onc team), follow up pathology -- see separate procedure chart note  -monitor for VB after procedure  -CT chest without contrast to r/o metastatic disease  -recommend outpatient med onc consultation  -gyn onc will follow    Pt seen, examined and counseled with Dr. Marcellus Gaona, pgy3

## 2019-02-25 NOTE — PATIENT PROFILE ADULT - HAS THE PATIENT EXPERIENCED ANY OF THE FOLLOWING WITHIN THE WEEK PRIOR TO ADMISSION?
Patient Information     Patient Name MRN Sex Jewels Valerio [id number] Female 1957      Patient Instructions by Ivone York MD at 2017 10:20 AM     Author:  Ivone York MD  Service:  (none) Author Type:  Physician     Filed:  2017 10:43 AM  Encounter Date:  2017 Status:  Addendum     :  Ivone York MD (Physician)        Related Notes: Original Note by Ivone York MD (Physician) filed at 2017 10:42 AM            Discussed refill of Xarelto with Cardiology nursing. We will call with lab results. Call for recurrent pain, fever, dark urine or light stools.        
fall

## 2019-02-25 NOTE — CONSULT NOTE ADULT - ATTENDING COMMENTS
GYN ONC Fellow Addendum  Agree with above note as edited personally.  Pt presenting with PMB, pelvic exam notable for prolapsing uterine mass (biopsied today), CT notable for enlarged uterus with heterogenous endometrium and bulky pelvic LAD.    Discussed with patient our suspicion for uterine malignancy and recommendation for primary chemotherapy due to left parametrial involvement on exam.  Could consider interval surgery pending response to chemotherapy.  Will coordinate med onc outpatient new patient consultation.  Monitor VB overnight.   MD Reid GYN ONC Fellow Addendum  Agree with above note as edited personally.  Pt presenting with PMB, pelvic exam notable for prolapsing uterine mass (biopsied today), CT notable for enlarged uterus with heterogenous endometrium and bulky LAD (2-3cm pelvic LN's and one 2cm paraaortic LN.)    Discussed with patient our suspicion for uterine malignancy and recommendation for primary chemotherapy due to left parametrial involvement on exam.  Could consider interval surgery pending response to chemotherapy.  Will coordinate med onc outpatient new patient consultation.  Monitor VB overnight.   MD Reid

## 2019-02-25 NOTE — H&P ADULT - HISTORY OF PRESENT ILLNESS
68-year-old female with no known past medical history brought in by EMS for syncopal episode.  Patient reports not having eaten all day, went to her gyn for a cervical biopsy and pap smear earlier this morning.  Afterwards, she went to go  food, while she was about to leave the food establishment she became lightheaded, started feeling hot and syncopized for what patient reports as a few seconds, awoke without confusion. Denies any chest pain or palpitations. She denies any head trauma upon falling.  She denies any previous history of syncope.  She states she has been having vaginal bleeding for the past 3 months, saturated 3 pads a day until recently when the bleeding has decreased and is now only going through about 1 pad/day.  She denies any history of prior blood transfusions.  She denies recent weight loss, night sweats, fevers, chills, headaches, chest pain palpitations, shortness of breath, coughing, nausea, vomiting, diarrhea, constipation, melena, hematochezia, dysuria, hematuria.  She does report some LLQ pain that started with DUB a few months ago, intermittent, cramping in quality.    In the ED VS:  98.9    143-194/  16-20  %RA, received ceftriaxone 1g IV x1, LR 1L IVF, acetaminophen 650mg PO x1 68-year-old female with no known past medical history brought in by EMS for syncopal episode.  Patient reports not having eaten all day, went to her gyn for a cervical biopsy and pap smear earlier this morning.  Afterwards, she went to go  food, while she was about to leave the food establishment she became lightheaded, started feeling hot and syncopized for what patient reports as a few seconds, awoke without confusion. Denies any chest pain or palpitations. She denies any head trauma upon falling.  She denies any previous history of syncope.  She states she has been having vaginal bleeding for the past 3 months, saturated 3 pads a day until recently when the bleeding has decreased and is now only going through about 1 pad/day.  She denies any history of prior blood transfusions.  She denies recent weight loss, night sweats, fevers, chills, headaches, chest pain palpitations, shortness of breath, coughing, nausea, vomiting, diarrhea, constipation, melena, hematochezia, dysuria, hematuria.  She does report some LLQ pain that started with DUB a few months ago, intermittent, cramping in quality.    Along with her gyn appointment, patient also reports seeing a new PMD (unsure of name) as well as a new cardiologist, who prescribed her a medication for blood pressure (she is not sure why she was instructed to see the cardiologist).      Of note, ED reported patient using norethindrone however patient denies hormone use to me.    In the ED VS:  98.9    143-194/  16-20  %RA, received ceftriaxone 1g IV x1, LR 1L IVF, acetaminophen 650mg PO x1

## 2019-02-25 NOTE — CONSULT NOTE ADULT - ASSESSMENT
69 y/o F with PMB a/w syncopal episode in setting of anemia. Pt found to have imaging features concerning for possible GYN malignancy. 67 y/o F with PMB a/w syncopal episode in setting of anemia. Pt found to have imaging features concerning for possible GYN malignancy with distended enlarged uterus, bulky pelvic lymphadenopathy.

## 2019-02-25 NOTE — H&P ADULT - PROBLEM SELECTOR PLAN 1
likely due to anemia/poor PO intake/vasovagal  fall precautions  s/p 1unit pRBC, repeat CBC likely due to anemia/poor PO intake/vasovagal  fall precautions  s/p 1unit pRBC, repeat CBC with poor response to 1unit pRBC  gyn consult called  check orthostatics  serial CBCs likely due to anemia/poor PO intake/vasovagal  fall precautions  s/p 1unit pRBC, repeat CBC with likely adequate response to 1unit pRBC with component of hemoconcentration on first set of labs (s/p IVF in ED)  gyn consult called  check orthostatics  serial CBCs

## 2019-02-25 NOTE — CHART NOTE - NSCHARTNOTEFT_GEN_A_CORE
GYN ONC Fellow Procedure Note    Procedure Name: Biopsy of prolapsing uterine mass through the cervix  Consent: obtained and signed by patient  Date and time: 2/25/19 at 17:00    Description of procedure in detail:  Patient brought to procedure room.  Appropriate consent with all risks, benefits and alternatives discussed. Consents signed and placed in chart. GYN ONC Fellow Procedure Note    Procedure Name: Biopsy of prolapsing uterine mass through the cervix  Consent: obtained and signed by patient  Date and time: 2/25/19 at 17:00    Procedure findings: Uterus enlarged about 18wk size. Cervix unable to be visualized, obscured by prolapsing mass about 5x3cm in dimension, light pink and fleshy. Bimanual exam reveals palpable left parametrial fullness and palpable right sidewall pelvic nodule about 2-3cm in size.  RV septum smooth.  No adnexal masses palpated.     Description of procedure in detail:  Patient brought to procedure room.  Appropriate consent with all risks, benefits and alternatives discussed. Consents signed and placed in chart.  Patient positioned in stirrups, bimanual exam performed.   Sterile speculum placed and prolapsing mass visualized.   Mass washed with betadine solution.  Kevorkian biopsy forceps used to take three biopsies from surface of mass.   Scant bleeding noted. Pressure held with scopette coated in monsel's solution.  Excellent hemostasis noted. Speculum removed and procedure concluded.  Patient tolerated procedure well.     Biopsies taken to pathology and rush processing requested.       MD Reid

## 2019-02-25 NOTE — PROGRESS NOTE ADULT - ASSESSMENT
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

## 2019-02-25 NOTE — H&P ADULT - NSHPPHYSICALEXAM_GEN_ALL_CORE
PHYSICAL EXAM:  GENERAL: NAD, well-developed, well-nourished  HEAD:  Atraumatic, Normocephalic  EYES: EOMI, PERRLA, conjunctiva and sclera clear  NECK: Supple, No JVD  CHEST/LUNG: Clear to auscultation bilaterally; No wheezes, rales or rhonchi  HEART: Regular rate and rhythm; No murmurs, rubs, or gallops, (+)S1, S2  ABDOMEN: Soft, Nondistended; Normal Bowel sounds; Mild TTP LLQ  EXTREMITIES:  2+ Peripheral Pulses, No clubbing, cyanosis, or edema  PSYCH: normal mood and affect  NEUROLOGY: AAOx3, non-focal  SKIN: No rashes or lesions

## 2019-02-26 LAB
ANION GAP SERPL CALC-SCNC: 16 MMO/L — HIGH (ref 7–14)
BUN SERPL-MCNC: 13 MG/DL — SIGNIFICANT CHANGE UP (ref 7–23)
CALCIUM SERPL-MCNC: 9.4 MG/DL — SIGNIFICANT CHANGE UP (ref 8.4–10.5)
CANCER AG125 SERPL-ACNC: 10 U/ML — SIGNIFICANT CHANGE UP
CANCER AG19-9 SERPL-ACNC: < 2 U/ML — SIGNIFICANT CHANGE UP
CHLORIDE SERPL-SCNC: 105 MMOL/L — SIGNIFICANT CHANGE UP (ref 98–107)
CO2 SERPL-SCNC: 23 MMOL/L — SIGNIFICANT CHANGE UP (ref 22–31)
CREAT SERPL-MCNC: 1.1 MG/DL — SIGNIFICANT CHANGE UP (ref 0.5–1.3)
GLUCOSE SERPL-MCNC: 93 MG/DL — SIGNIFICANT CHANGE UP (ref 70–99)
HCT VFR BLD CALC: 30.6 % — LOW (ref 34.5–45)
HGB BLD-MCNC: 9.2 G/DL — LOW (ref 11.5–15.5)
MAGNESIUM SERPL-MCNC: 2.1 MG/DL — SIGNIFICANT CHANGE UP (ref 1.6–2.6)
MCHC RBC-ENTMCNC: 23.4 PG — LOW (ref 27–34)
MCHC RBC-ENTMCNC: 30.1 % — LOW (ref 32–36)
MCV RBC AUTO: 77.9 FL — LOW (ref 80–100)
NRBC # FLD: 0 K/UL — LOW (ref 25–125)
PLATELET # BLD AUTO: 479 K/UL — HIGH (ref 150–400)
PMV BLD: 9.4 FL — SIGNIFICANT CHANGE UP (ref 7–13)
POTASSIUM SERPL-MCNC: 3.7 MMOL/L — SIGNIFICANT CHANGE UP (ref 3.5–5.3)
POTASSIUM SERPL-SCNC: 3.7 MMOL/L — SIGNIFICANT CHANGE UP (ref 3.5–5.3)
RBC # BLD: 3.93 M/UL — SIGNIFICANT CHANGE UP (ref 3.8–5.2)
RBC # FLD: 18 % — HIGH (ref 10.3–14.5)
SODIUM SERPL-SCNC: 144 MMOL/L — SIGNIFICANT CHANGE UP (ref 135–145)
SPECIMEN SOURCE: SIGNIFICANT CHANGE UP
SPECIMEN SOURCE: SIGNIFICANT CHANGE UP
WBC # BLD: 7.31 K/UL — SIGNIFICANT CHANGE UP (ref 3.8–10.5)
WBC # FLD AUTO: 7.31 K/UL — SIGNIFICANT CHANGE UP (ref 3.8–10.5)

## 2019-02-26 PROCEDURE — 71250 CT THORAX DX C-: CPT | Mod: 26

## 2019-02-26 PROCEDURE — 99233 SBSQ HOSP IP/OBS HIGH 50: CPT

## 2019-02-26 RX ORDER — ACETAMINOPHEN 500 MG
650 TABLET ORAL EVERY 6 HOURS
Qty: 0 | Refills: 0 | Status: DISCONTINUED | OUTPATIENT
Start: 2019-02-26 | End: 2019-02-27

## 2019-02-26 RX ADMIN — CEFTRIAXONE 100 GRAM(S): 500 INJECTION, POWDER, FOR SOLUTION INTRAMUSCULAR; INTRAVENOUS at 22:01

## 2019-02-26 RX ADMIN — Medication 650 MILLIGRAM(S): at 15:21

## 2019-02-26 RX ADMIN — Medication 650 MILLIGRAM(S): at 15:51

## 2019-02-26 NOTE — PROGRESS NOTE ADULT - PROBLEM SELECTOR PLAN 1
-continue pad counts  -close outpt medical oncology and radiation oncology follow up  -no surgical intervention at this time    Candelaria, pgy3 -continue pad counts  -f/u cervical biopsies (2/25)  -coordinating outpt care with med onc  -stable for discharge from gyn onc perspective    d/w Dr. Marcellus Gaona, pgy3    Candelaria, pgy3 -f/u biopsies of prolapsing mass (2/25)  -coordinating outpt care with med onc  -f/u with Dr. Mayo in her office  -recommend pt see PCP for referral for screening colonoscopy (last >5-10yr ago per pt) and mammogram (last about 2 year ago and wnl per pt)  -stable for discharge from gyn onc perspective with close outpatient follow-up    d/w Dr. Marcellus Gaona, pgy3    Candelaria, pgy3

## 2019-02-26 NOTE — PROGRESS NOTE ADULT - PROBLEM SELECTOR PLAN 1
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

## 2019-02-26 NOTE — PROGRESS NOTE ADULT - ASSESSMENT
68y female HD#2, admitted after episode of syncope, found to have prolapsing cervical mass, now s/p cervical biopsy (2/25). Pt doing well this AM. 68y female HD#2, admitted after episode of syncope, found to have prolapsing cervical mass, now s/p cervical biopsy (2/25). Strong suspicion for uterine malignancy based on history, physical exam and imaging findings. Pt doing well this AM.

## 2019-02-27 ENCOUNTER — TRANSCRIPTION ENCOUNTER (OUTPATIENT)
Age: 69
End: 2019-02-27

## 2019-02-27 VITALS
RESPIRATION RATE: 18 BRPM | HEART RATE: 81 BPM | OXYGEN SATURATION: 95 % | TEMPERATURE: 99 F | DIASTOLIC BLOOD PRESSURE: 89 MMHG | SYSTOLIC BLOOD PRESSURE: 153 MMHG

## 2019-02-27 LAB
ANION GAP SERPL CALC-SCNC: 15 MMO/L — HIGH (ref 7–14)
BASOPHILS # BLD AUTO: 0.03 K/UL — SIGNIFICANT CHANGE UP (ref 0–0.2)
BASOPHILS NFR BLD AUTO: 0.5 % — SIGNIFICANT CHANGE UP (ref 0–2)
BUN SERPL-MCNC: 15 MG/DL — SIGNIFICANT CHANGE UP (ref 7–23)
CALCIUM SERPL-MCNC: 9.1 MG/DL — SIGNIFICANT CHANGE UP (ref 8.4–10.5)
CHLORIDE SERPL-SCNC: 109 MMOL/L — HIGH (ref 98–107)
CO2 SERPL-SCNC: 23 MMOL/L — SIGNIFICANT CHANGE UP (ref 22–31)
CREAT SERPL-MCNC: 1.1 MG/DL — SIGNIFICANT CHANGE UP (ref 0.5–1.3)
EOSINOPHIL # BLD AUTO: 0.24 K/UL — SIGNIFICANT CHANGE UP (ref 0–0.5)
EOSINOPHIL NFR BLD AUTO: 3.8 % — SIGNIFICANT CHANGE UP (ref 0–6)
GLUCOSE SERPL-MCNC: 83 MG/DL — SIGNIFICANT CHANGE UP (ref 70–99)
HCT VFR BLD CALC: 31.5 % — LOW (ref 34.5–45)
HGB BLD-MCNC: 9.2 G/DL — LOW (ref 11.5–15.5)
IMM GRANULOCYTES NFR BLD AUTO: 0.2 % — SIGNIFICANT CHANGE UP (ref 0–1.5)
LYMPHOCYTES # BLD AUTO: 2.09 K/UL — SIGNIFICANT CHANGE UP (ref 1–3.3)
LYMPHOCYTES # BLD AUTO: 33.1 % — SIGNIFICANT CHANGE UP (ref 13–44)
MAGNESIUM SERPL-MCNC: 2.1 MG/DL — SIGNIFICANT CHANGE UP (ref 1.6–2.6)
MCHC RBC-ENTMCNC: 23.9 PG — LOW (ref 27–34)
MCHC RBC-ENTMCNC: 29.2 % — LOW (ref 32–36)
MCV RBC AUTO: 81.8 FL — SIGNIFICANT CHANGE UP (ref 80–100)
METHOD TYPE: SIGNIFICANT CHANGE UP
MONOCYTES # BLD AUTO: 0.53 K/UL — SIGNIFICANT CHANGE UP (ref 0–0.9)
MONOCYTES NFR BLD AUTO: 8.4 % — SIGNIFICANT CHANGE UP (ref 2–14)
NEUTROPHILS # BLD AUTO: 3.42 K/UL — SIGNIFICANT CHANGE UP (ref 1.8–7.4)
NEUTROPHILS NFR BLD AUTO: 54 % — SIGNIFICANT CHANGE UP (ref 43–77)
NRBC # FLD: 0 K/UL — LOW (ref 25–125)
ORGANISM # SPEC MICROSCOPIC CNT: SIGNIFICANT CHANGE UP
ORGANISM # SPEC MICROSCOPIC CNT: SIGNIFICANT CHANGE UP
PLATELET # BLD AUTO: 464 K/UL — HIGH (ref 150–400)
PMV BLD: 9.5 FL — SIGNIFICANT CHANGE UP (ref 7–13)
POTASSIUM SERPL-MCNC: 4 MMOL/L — SIGNIFICANT CHANGE UP (ref 3.5–5.3)
POTASSIUM SERPL-SCNC: 4 MMOL/L — SIGNIFICANT CHANGE UP (ref 3.5–5.3)
RBC # BLD: 3.85 M/UL — SIGNIFICANT CHANGE UP (ref 3.8–5.2)
RBC # FLD: 18.6 % — HIGH (ref 10.3–14.5)
SODIUM SERPL-SCNC: 147 MMOL/L — HIGH (ref 135–145)
WBC # BLD: 6.32 K/UL — SIGNIFICANT CHANGE UP (ref 3.8–10.5)
WBC # FLD AUTO: 6.32 K/UL — SIGNIFICANT CHANGE UP (ref 3.8–10.5)

## 2019-02-27 PROCEDURE — 99239 HOSP IP/OBS DSCHRG MGMT >30: CPT

## 2019-02-27 RX ORDER — SODIUM CHLORIDE 9 MG/ML
1000 INJECTION, SOLUTION INTRAVENOUS
Qty: 0 | Refills: 0 | Status: COMPLETED | OUTPATIENT
Start: 2019-02-27 | End: 2019-02-27

## 2019-02-27 RX ADMIN — CEFTRIAXONE 100 GRAM(S): 500 INJECTION, POWDER, FOR SOLUTION INTRAMUSCULAR; INTRAVENOUS at 14:43

## 2019-02-27 RX ADMIN — SODIUM CHLORIDE 100 MILLILITER(S): 9 INJECTION, SOLUTION INTRAVENOUS at 12:00

## 2019-02-27 NOTE — DISCHARGE NOTE ADULT - ADDITIONAL INSTRUCTIONS
Follow up with PCP within 1-2 weeks of discharge.   Follow up with GYN/ONC within 1-2 weeks of discharge.   Follow up with gastroenterology within 1-2 weeks of discharge. You need a screening colonoscopy.

## 2019-02-27 NOTE — DISCHARGE NOTE ADULT - HOSPITAL COURSE
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     1. 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 Patient is s/p 1unit pRBC, repeat CBC with likely adequate response and likely component of hemoconcentration on first set of labs (s/p IVF in ED). GYN/ONC consulted   gyn/onc on board  fall precautions  monitor CBC.      Problem/Plan - 2:  ·  Problem: Thickened endometrium.  Plan: PMHx of fibroid tumor, CT findings with distended heterogeneous endometrium, extensive pelvic and retroperitoneal lymphadenopathy consistent with metastatic disease; CT chest with left supraclavicular lymphadenopathy worrisome for metastatic disease is amenable to ultrasound-guided biopsy, 2 nonspecific tiny left lower lobe nodules  - F/U biopsies of prolapsing mass (2/25)  - Recommending F/U with Dr. Mayo in her office  - CEA, , CA 19-9 WNL  - GYN/ONC following, recommendations appreciated.      Problem/Plan - 3:  ·  Problem: Microcytic anemia.  Plan: in setting of DUB x3mo, s/p 1unit pRBC in ED  Follow CBC  w/u for endometrial thickening as above  due for repeat screening colonoscopy as well, last done in 80s, recommend as OP.      Problem/Plan - 4:  ·  Problem: Acute cystitis with hematuria.  Plan: + UA, lower abdominal pain, urine cx + E. Coli  - continue IV ceftriaxone  - F/U cx sensitivities.      Problem/Plan - 5:  ·  Problem: Thrombocytosis.  Plan: likely reactive  monitor/trend.      Problem/Plan - 6:  Problem: Need for prophylactic measure. Plan: SCDs in light of DUB. 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     1. 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 Patient is s/p 1unit pRBC, repeat CBC with likely adequate response and likely component of hemoconcentration on first set of labs (s/p IVF in ED).  2. Anemia - ·Patient was noted to have anemia in the setting of dysfunctional uterine bleeding for 3 months. Patient underwent CT scan of abdomen and pelvis, found to have distended endometrium, likely representing cervical carcinoma. GYN/ONC consulted. Biopsy was obtained, results pending. Noted that patient was due for repeat screening colonoscopy, recommended as outpatient. Biopsy results to be followed up with Dr Mayo. Tumor markers drawn and will also be followed as outpatient.   3. Cystitis: +UA and +UCx - received ceftriaxone therapy.   4. Thrombocytosis - likely reactive.     Patient seen and evaluated, no acute somatic complaints. Medically cleared/stable for discharge as per Dr. Cao with close outpatient follow up within 1-2 weeks of discharge. Patient understands and agrees with plan of care.

## 2019-02-27 NOTE — PROGRESS NOTE ADULT - PROBLEM SELECTOR PLAN 5
likely reactive  monitor/trend
+ UA, lower abdominal pain  - continue IV ceftriaxone  - F/U urine cx
+ UA, lower abdominal pain, urine cx + E. Coli  - continue IV ceftriaxone  - F/U cx sensitivities

## 2019-02-27 NOTE — PROGRESS NOTE ADULT - PROBLEM SELECTOR PROBLEM 3
Microcytic anemia
Retroperitoneal lymphadenopathy

## 2019-02-27 NOTE — PROGRESS NOTE ADULT - ASSESSMENT
67yo with postmenopausal bleeding admitted for syncope and anemia s/p 1unit PRBCs with enlarged uterus - suspicious for uterine vs cervical malignancy, HD #4, stable

## 2019-02-27 NOTE — DISCHARGE NOTE ADULT - CARE PROVIDER_API CALL
Valentina Mayo)  Gynecologic Oncology; Obstetrics and Gynecology  [facility] [facility], [address] [place] [address]  Phone: [phone] option 2  Fax: [phone]  Follow Up Time:

## 2019-02-27 NOTE — PROGRESS NOTE ADULT - PROBLEM SELECTOR PLAN 4
+ UA, lower abdominal pain, urine cx + E. Coli  - continue IV ceftriaxone  - F/U cx sensitivities
in setting of DUB x3mo, s/p 1unit pRBC in ED  Follow CBC  w/u for endometrial thickening as above  due for repeat screening colonoscopy as well, last done in 80s  will check routine FOBT
in setting of DUB x3mo, s/p 1unit pRBC in ED  Follow CBC  w/u for endometrial thickening as above  due for repeat screening colonoscopy as well, last done in 80s, recommend as OP

## 2019-02-27 NOTE — PROGRESS NOTE ADULT - PROBLEM SELECTOR PLAN 3
in setting of DUB x3mo, s/p 1unit pRBC in ED  Follow CBC  w/u for endometrial thickening as above  due for repeat screening colonoscopy as well, last done in 80s, recommend as OP
Likely in setting of suspected metastatic gyn malignancy  - OBGYN following, recs appreciated
Likely in setting of suspected metastatic gyn malignancy  - gyn/onc following, recs appreciated

## 2019-02-27 NOTE — DISCHARGE NOTE ADULT - CARE PLAN
Principal Discharge DX:	Syncope  Goal:	Outpt follow up.  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 Patient is s/p 1unit pRBC, repeat CBC with likely adequate response and likely component of hemoconcentration on first set of labs (s/p IVF in ED). Follow up with PCP within 1-2 weeks of discharge.  Secondary Diagnosis:	Microcytic anemia  Goal:	Outpt follow up.  Assessment and plan of treatment:	·Patient was noted to have anemia in the setting of dysfunctional uterine bleeding for 3 months. Patient underwent CT scan of abdomen and pelvis, found to have distended endometrium, likely representing cervical carcinoma. GYN/ONC consulted. Biopsy was obtained, results pending. Noted that patient was due for repeat screening colonoscopy, recommended as outpatient. Biopsy results to be followed up with Dr Mayo. Tumor markers drawn and will also be followed as outpatient.  Secondary Diagnosis:	UTI (urinary tract infection)  Goal:	monitor  Assessment and plan of treatment:	+UA and +UCulture- received ceftriaxone therapy. No more therapy is needed at this time. Completion of abx while inpatient.

## 2019-02-27 NOTE — DISCHARGE NOTE ADULT - PATIENT PORTAL LINK FT
You can access the [facility] Patient Portal, offered by [facility], by registering with the following website: [email or web address]

## 2019-02-27 NOTE — PROGRESS NOTE ADULT - SUBJECTIVE AND OBJECTIVE BOX
Patient is a 68y old  Female who presents with a chief complaint of syncopal episode (26 Feb 2019 11:27)      SUBJECTIVE / OVERNIGHT EVENTS:  Patient seen denying any significant complaints. Tearful when information about her CT chest was delivered.      MEDICATIONS  (STANDING):  cefTRIAXone   IVPB 1 Gram(s) IV Intermittent every 24 hours  dextrose 5%. 1000 milliLiter(s) (100 mL/Hr) IV Continuous <Continuous>    MEDICATIONS  (PRN):  acetaminophen   Tablet .. 650 milliGRAM(s) Oral every 6 hours PRN Mild Pain (1 - 3), Moderate Pain (4 - 6)      Vital Signs Last 24 Hrs  T(C): 37.1 (27 Feb 2019 06:16), Max: 37.1 (27 Feb 2019 06:16)  T(F): 98.7 (27 Feb 2019 06:16), Max: 98.7 (27 Feb 2019 06:16)  HR: 76 (27 Feb 2019 06:48) (76 - 83)  BP: 157/91 (27 Feb 2019 06:48) (142/84 - 176/93)  BP(mean): --  RR: 18 (27 Feb 2019 06:48) (17 - 18)  SpO2: 100% (27 Feb 2019 06:48) (95% - 100%)  CAPILLARY BLOOD GLUCOSE        I&O's Summary    26 Feb 2019 07:01  -  27 Feb 2019 07:00  --------------------------------------------------------  IN: 50 mL / OUT: 0 mL / NET: 50 mL        PHYSICAL EXAM  GENERAL: NAD, well-developed, elderly woman sitting up in bed, cooperative  HEAD:  Atraumatic, Normocephalic  EYES: EOMI, PERRLA, conjunctiva and sclera clear  NECK: Supple, No JVD  CHEST/LUNG: Clear to auscultation bilaterally; No wheeze  HEART: Regular rate and rhythm; No murmurs, rubs, or gallops  ABDOMEN: Soft, Nontender, Nondistended; Bowel sounds present  EXTREMITIES:  2+ Peripheral Pulses, No clubbing, cyanosis, or edema  PSYCH: AAOx3  SKIN: No rashes or lesions      LABS:                        9.2    6.32  )-----------( 464      ( 27 Feb 2019 07:41 )             31.5     02-27    147<H>  |  109<H>  |  15  ----------------------------<  83  4.0   |  23  |  1.10    Ca    9.1      27 Feb 2019 07:41  Mg     2.1     02-27        Culture - Blood (collected 25 Feb 2019 18:28)  Source: BLOOD  Preliminary Report (26 Feb 2019 18:26):    NO ORGANISMS ISOLATED    NO ORGANISMS ISOLATED AT 24 HOURS    Culture - Urine (collected 25 Feb 2019 03:25)  Source: URINE MIDSTREAM  Preliminary Report (26 Feb 2019 10:02):    EC^Escherichia coli    COLONY COUNT: > = 100,000 CFU/ML        RADIOLOGY & ADDITIONAL TESTS:    Imaging Personally Reviewed:  Consultant(s) Notes Reviewed:    Care Discussed with Consultants/Other Providers:
Gyn ONC Progress Note HD#2    Subjective:   Pt seen and examined at bedside. No events overnight. Pain well controlled. Patient ambulating. Passing flatus. Tolerating regular diet. Pt denies fever, chills, chest pain, SOB, nausea, vomiting, lightheadedness, dizziness. Notes mild brown discharge on her pad.    Objective:  T(F): 97.7 (19 @ 05:05), Max: 98.3 (19 @ 12:24)  HR: 70 (19 @ 05:05) (70 - 89)  BP: 138/78 (19 @ 05:05) (138/78 - 157/97)  RR: 16 (19 @ 05:05) (16 - 18)  SpO2: 98% (19 @ 05:05) (97% - 99%)  Wt(kg): --  I&O's Summary    2019 07:01  -  2019 06:54  --------------------------------------------------------  IN: 500 mL / OUT: 0 mL / NET: 500 mL      CAPILLARY BLOOD GLUCOSE          MEDICATIONS  (STANDING):  cefTRIAXone   IVPB 1 Gram(s) IV Intermittent every 24 hours    MEDICATIONS  (PRN):      Physical Exam:  Constitutional: NAD, A+O x3  CV: RRR  Lungs: clear to auscultation bilaterally  Abdomen: soft, nondistended, no guarding, no rebound, normal bowel sounds  Gyn: min brown staining on pad  Extremities: no lower extremity edema or calf tenderness bilaterally    LABS:        143    |  104    |  13     ----------------------------<  115<H>  3.6     |  25     |  1.06     Ca    9.4        2019 02:58  Phos  3.2         Mg     1.9                 PT/INR - ( 2019 19:30 )   PT: 14.7 SEC;   INR: 1.28          PTT - ( 2019 19:30 )  PTT:27.1 SEC  Urinalysis Basic - ( 2019 20:00 )    Color: YELLOW / Appearance: TURBID / S.024 / pH: 6.0  Gluc: NEGATIVE / Ketone: TRACE  / Bili: NEGATIVE / Urobili: NORMAL   Blood: LARGE / Protein: 100 / Nitrite: POSITIVE   Leuk Esterase: LARGE / RBC: >50 / WBC 26-50   Sq Epi: x / Non Sq Epi: FEW / Bacteria: MODERATE
Patient is a 68y old  Female who presents with a chief complaint of syncopal episode (2019 02:57)      SUBJECTIVE / OVERNIGHT EVENTS:  Patient seen complaining of bloody discharge from vagina, denies CP/dyspnea, N/V/D.     MEDICATIONS  (STANDING):  cefTRIAXone   IVPB 1 Gram(s) IV Intermittent every 24 hours    MEDICATIONS  (PRN):      Vital Signs Last 24 Hrs  T(C): 36.8 (2019 12:24), Max: 37.2 (2019 23:55)  T(F): 98.3 (2019 12:24), Max: 98.9 (2019 23:55)  HR: 86 (2019 12:24) (85 - 104)  BP: 152/103 (2019 12:24) (143/79 - 194/87)  BP(mean): --  RR: 18 (2019 12:24) (16 - 20)  SpO2: 97% (2019 12:24) (97% - 100%)  CAPILLARY BLOOD GLUCOSE      POCT Blood Glucose.: 87 mg/dL (2019 17:22)    I&O's Summary        PHYSICAL EXAM  GENERAL: NAD, well-developed, elderly woman sitting up in bed, cooperative  HEAD:  Atraumatic, Normocephalic  EYES: EOMI, PERRLA, conjunctiva and sclera clear  NECK: Supple, No JVD  CHEST/LUNG: Clear to auscultation bilaterally; No wheeze  HEART: Regular rate and rhythm; No murmurs, rubs, or gallops  ABDOMEN: Soft, Nontender, Nondistended; Bowel sounds present  EXTREMITIES:  2+ Peripheral Pulses, No clubbing, cyanosis, or edema  PSYCH: AAOx3  SKIN: No rashes or lesions    LABS:                        8.0    8.33  )-----------( 433      ( 2019 02:58 )             26.7     -    143  |  104  |  13  ----------------------------<  115<H>  3.6   |  25  |  1.06    Ca    9.4      2019 02:58  Phos  3.2     02-  Mg     1.9         TPro  8.5<H>  /  Alb  3.7  /  TBili  0.2  /  DBili  x   /  AST  13  /  ALT  3<L>  /  AlkPhos  65  02-24    PT/INR - ( 2019 19:30 )   PT: 14.7 SEC;   INR: 1.28          PTT - ( 2019 19:30 )  PTT:27.1 SEC      Urinalysis Basic - ( 2019 20:00 )    Color: YELLOW / Appearance: TURBID / S.024 / pH: 6.0  Gluc: NEGATIVE / Ketone: TRACE  / Bili: NEGATIVE / Urobili: NORMAL   Blood: LARGE / Protein: 100 / Nitrite: POSITIVE   Leuk Esterase: LARGE / RBC: >50 / WBC 26-50   Sq Epi: x / Non Sq Epi: FEW / Bacteria: MODERATE          RADIOLOGY & ADDITIONAL TESTS:    Imaging Personally Reviewed:  Consultant(s) Notes Reviewed:    Care Discussed with Consultants/Other Providers:
Patient is a 68y old  Female who presents with a chief complaint of syncopal episode (2019 06:54)      SUBJECTIVE / OVERNIGHT EVENTS:  Patient seen reporting vaginal bleeding slowed down, otherwise no new complaints.     MEDICATIONS  (STANDING):  cefTRIAXone   IVPB 1 Gram(s) IV Intermittent every 24 hours    MEDICATIONS  (PRN):      Vital Signs Last 24 Hrs  T(C): 36.5 (2019 05:05), Max: 36.8 (2019 12:24)  T(F): 97.7 (2019 05:05), Max: 98.3 (2019 12:24)  HR: 70 (2019 05:05) (70 - 86)  BP: 138/78 (2019 05:05) (138/78 - 152/103)  BP(mean): --  RR: 16 (2019 05:05) (16 - 18)  SpO2: 98% (2019 05:05) (97% - 98%)  CAPILLARY BLOOD GLUCOSE        I&O's Summary    2019 07:01  -  2019 07:00  --------------------------------------------------------  IN: 500 mL / OUT: 0 mL / NET: 500 mL      PHYSICAL EXAM  GENERAL: NAD, well-developed, elderly woman sitting up in bed, cooperative  HEAD:  Atraumatic, Normocephalic  EYES: EOMI, PERRLA, conjunctiva and sclera clear  NECK: Supple, No JVD  CHEST/LUNG: Clear to auscultation bilaterally; No wheeze  HEART: Regular rate and rhythm; No murmurs, rubs, or gallops  ABDOMEN: Soft, Nontender, Nondistended; Bowel sounds present  EXTREMITIES:  2+ Peripheral Pulses, No clubbing, cyanosis, or edema  PSYCH: AAOx3  SKIN: No rashes or lesions      LABS:                        9.2    7.31  )-----------( 479      ( 2019 05:30 )             30.6     02-    144  |  105  |  13  ----------------------------<  93  3.7   |  23  |  1.10    Ca    9.4      2019 05:30  Phos  3.2     -  Mg     2.1         TPro  8.5<H>  /  Alb  3.7  /  TBili  0.2  /  DBili  x   /  AST  13  /  ALT  3<L>  /  AlkPhos  65  02-24    PT/INR - ( 2019 19:30 )   PT: 14.7 SEC;   INR: 1.28          PTT - ( 2019 19:30 )  PTT:27.1 SEC      Urinalysis Basic - ( 2019 20:00 )    Color: YELLOW / Appearance: TURBID / S.024 / pH: 6.0  Gluc: NEGATIVE / Ketone: TRACE  / Bili: NEGATIVE / Urobili: NORMAL   Blood: LARGE / Protein: 100 / Nitrite: POSITIVE   Leuk Esterase: LARGE / RBC: >50 / WBC 26-50   Sq Epi: x / Non Sq Epi: FEW / Bacteria: MODERATE        Culture - Urine (collected 2019 03:25)  Source: URINE MIDSTREAM  Preliminary Report (2019 10:02):    EC^Escherichia coli    COLONY COUNT: > = 100,000 CFU/ML        RADIOLOGY & ADDITIONAL TESTS:    Imaging Personally Reviewed:  Consultant(s) Notes Reviewed:    Care Discussed with Consultants/Other Providers:
patient seen and examined at bedside.  pt feels comfortable - denies chest pain, shortness of breath, lightheadedness, dizziness.  pt states she has minimal vaginal bleeding noted.      GEN: NAD  ABD: SOFT, NONTENDER, DISTENDED DUE TO FIBROID UTERUS  VAG: MINIMAL BLOOD NOTED  LE: NO CALF TENDERNESS OR EDEMA NOTED BILATERALLY    CT SCAN ABD/PELVIS:  Heterogeneous endometrium distended to approximately 6.7 cm.  The   findings could represent cervical carcinoma with complex fluid in the   endometrial canal versus endometrial carcinoma.  Extensive pelvic and   retroperitoneal lymphadenopathy consistent with metastatic disease.    CT SCAN CHEST: : Left supraclavicular lymphadenopathy worrisome for metastatic   disease is amenable to ultrasound-guided biopsy.    2 nonspecific tiny left lower lobe nodules.

## 2019-02-27 NOTE — DISCHARGE NOTE ADULT - PLAN OF CARE
Outpt follow up. Admitted for syncope, likely due to symptomatic anemia from metastatic gyn malignancy of unclear etiology, orthostatics negative. Patient is s/p 1unit pRBC, repeat CBC with likely adequate response and likely component of hemoconcentration on first set of labs (s/p IVF in ED). Follow up with PCP within 1-2 weeks of discharge. ·Patient was noted to have anemia in the setting of dysfunctional uterine bleeding for 3 months. Patient underwent CT scan of abdomen and pelvis, found to have distended endometrium, likely representing cervical carcinoma. GYN/ONC consulted. Biopsy was obtained, results pending. Noted that patient was due for repeat screening colonoscopy, recommended as outpatient. Biopsy results to be followed up with Dr Mayo. Tumor markers drawn and will also be followed as outpatient. monitor +UA and +UCulture- received ceftriaxone therapy. No more therapy is needed at this time. Completion of abx while inpatient.

## 2019-02-27 NOTE — PROGRESS NOTE ADULT - PROBLEM SELECTOR PLAN 2
PMHx of fibroid tumor, CT findings with distended heterogeneous endometrium, extensive pelvic and retroperitoneal lymphadenopathy consistent with metastatic disease; CT chest with left supraclavicular lymphadenopathy worrisome for metastatic disease is amenable to ultrasound-guided biopsy, 2 nonspecific tiny left lower lobe nodules  - F/U biopsies of prolapsing mass (2/25)  - Recommending F/U with Dr. Mayo in her office  - CEA, , CA 19-9 WNL  - GYN/ONC following, recommendations appreciated
PMHx of fibroid tumor, CT findings with heterogeneous endometrium distended to approximately 6.7 cm, ddx cervical carcinoma with complex fluid in the   endometrial canal versus endometrial carcinoma, extensive pelvic and   retroperitoneal lymphadenopathy consistent with metastatic disease  - F/U biopsies of prolapsing mass (2/25)  - Recommending F/U with Dr. Mayo in her office, may need referral to someone in [place] where she lives to maximize her convenience  - CEA, , CA 19-9 WNL, F/U CT chest for staging  - GYN/ONC following, recommendations appreciated
PMHx of fibroid tumor, CT findings with heterogeneous endometrium distended to approximately 6.7 cm, ddx cervical carcinoma with complex fluid in the   endometrial canal versus endometrial carcinoma, extensive pelvic and   retroperitoneal lymphadenopathy consistent with metastatic disease  - patient deferred DIC, asking for hysterectomy, GYN to determine timing of surgery  - F/U CEA, , CA 19-9, CT chest for staging  - OBGYN following, recommendations appreciated
- most likely secondary to uterine vs cervical pathology - high suspicious for malignancy.  Cervical mass biopsies done by GYN oncology - pending results of pathology.  Patient will follow up with GYN oncology in 2 weeks.    - advised patient if vaginal bleeding worsens and/or symptomatic - follow up sooner   - advised patient to follow up with me in office in 2 weeks

## 2019-02-28 PROBLEM — R19.00 PELVIC MASS IN FEMALE: Status: ACTIVE | Noted: 2019-02-28

## 2019-02-28 PROBLEM — Z00.00 ENCOUNTER FOR PREVENTIVE HEALTH EXAMINATION: Status: ACTIVE | Noted: 2019-02-28

## 2019-02-28 LAB
-  AMIKACIN: SIGNIFICANT CHANGE UP
-  AMPICILLIN/SULBACTAM: SIGNIFICANT CHANGE UP
-  AMPICILLIN: SIGNIFICANT CHANGE UP
-  AZTREONAM: SIGNIFICANT CHANGE UP
-  CEFAZOLIN: SIGNIFICANT CHANGE UP
-  CEFEPIME: SIGNIFICANT CHANGE UP
-  CEFOXITIN: SIGNIFICANT CHANGE UP
-  CEFTAZIDIME: SIGNIFICANT CHANGE UP
-  CEFTRIAXONE: SIGNIFICANT CHANGE UP
-  CIPROFLOXACIN: SIGNIFICANT CHANGE UP
-  ERTAPENEM: SIGNIFICANT CHANGE UP
-  GENTAMICIN: SIGNIFICANT CHANGE UP
-  IMIPENEM: SIGNIFICANT CHANGE UP
-  LEVOFLOXACIN: SIGNIFICANT CHANGE UP
-  MEROPENEM: SIGNIFICANT CHANGE UP
-  NITROFURANTOIN: SIGNIFICANT CHANGE UP
-  PIPERACILLIN/TAZOBACTAM: SIGNIFICANT CHANGE UP
-  TIGECYCLINE: SIGNIFICANT CHANGE UP
-  TOBRAMYCIN: SIGNIFICANT CHANGE UP
-  TRIMETHOPRIM/SULFAMETHOXAZOLE: SIGNIFICANT CHANGE UP
BACTERIA UR CULT: SIGNIFICANT CHANGE UP
ORGANISM # SPEC MICROSCOPIC CNT: SIGNIFICANT CHANGE UP
ORGANISM # SPEC MICROSCOPIC CNT: SIGNIFICANT CHANGE UP

## 2019-03-02 LAB — BACTERIA BLD CULT: SIGNIFICANT CHANGE UP

## 2019-03-05 ENCOUNTER — APPOINTMENT (OUTPATIENT)
Dept: GYNECOLOGIC ONCOLOGY | Facility: CLINIC | Age: 69
End: 2019-03-05

## 2019-03-05 ENCOUNTER — OUTPATIENT (OUTPATIENT)
Dept: OUTPATIENT SERVICES | Facility: HOSPITAL | Age: 69
LOS: 1 days | Discharge: HOME | End: 2019-03-05

## 2019-03-05 DIAGNOSIS — R19.00 INTRA-ABDOMINAL AND PELVIC SWELLING, MASS AND LUMP, UNSPECIFIED SITE: ICD-10-CM

## 2019-03-05 DIAGNOSIS — Z98.890 OTHER SPECIFIED POSTPROCEDURAL STATES: Chronic | ICD-10-CM

## 2019-03-05 DIAGNOSIS — C57.4 MALIGNANT NEOPLASM OF UTERINE ADNEXA, UNSPECIFIED: ICD-10-CM

## 2019-03-08 ENCOUNTER — APPOINTMENT (OUTPATIENT)
Dept: HEMATOLOGY ONCOLOGY | Facility: CLINIC | Age: 69
End: 2019-03-08
Payer: COMMERCIAL

## 2019-03-18 PROBLEM — R93.89 THICKENED ENDOMETRIUM: Status: ACTIVE | Noted: 2019-03-18

## 2019-03-18 PROBLEM — D64.9 ANEMIA: Status: ACTIVE | Noted: 2019-03-18

## 2019-03-18 PROBLEM — N95.0 POSTMENOPAUSAL BLEEDING: Status: ACTIVE | Noted: 2019-03-18

## 2019-03-19 ENCOUNTER — APPOINTMENT (OUTPATIENT)
Dept: GYNECOLOGIC ONCOLOGY | Facility: CLINIC | Age: 69
End: 2019-03-19

## 2019-03-19 DIAGNOSIS — D64.9 ANEMIA, UNSPECIFIED: ICD-10-CM

## 2019-03-19 DIAGNOSIS — R93.89 ABNORMAL FINDINGS ON DIAGNOSTIC IMAGING OF OTHER SPECIFIED BODY STRUCTURES: ICD-10-CM

## 2019-03-19 DIAGNOSIS — Z87.440 PERSONAL HISTORY OF URINARY (TRACT) INFECTIONS: ICD-10-CM

## 2019-03-19 DIAGNOSIS — N95.0 POSTMENOPAUSAL BLEEDING: ICD-10-CM

## 2019-03-25 ENCOUNTER — RESULT REVIEW (OUTPATIENT)
Age: 69
End: 2019-03-25

## 2019-03-25 ENCOUNTER — APPOINTMENT (OUTPATIENT)
Dept: HEMATOLOGY ONCOLOGY | Facility: CLINIC | Age: 69
End: 2019-03-25
Payer: COMMERCIAL

## 2019-03-25 VITALS
RESPIRATION RATE: 16 BRPM | HEART RATE: 111 BPM | OXYGEN SATURATION: 98 % | SYSTOLIC BLOOD PRESSURE: 142 MMHG | HEIGHT: 62.8 IN | WEIGHT: 192.68 LBS | TEMPERATURE: 98.7 F | DIASTOLIC BLOOD PRESSURE: 93 MMHG | BODY MASS INDEX: 34.14 KG/M2

## 2019-03-25 DIAGNOSIS — Z86.79 PERSONAL HISTORY OF OTHER DISEASES OF THE CIRCULATORY SYSTEM: ICD-10-CM

## 2019-03-25 DIAGNOSIS — F17.200 NICOTINE DEPENDENCE, UNSPECIFIED, UNCOMPLICATED: ICD-10-CM

## 2019-03-25 DIAGNOSIS — R55 SYNCOPE AND COLLAPSE: ICD-10-CM

## 2019-03-25 LAB
APTT BLD: 34.2 SEC
HCT VFR BLD CALC: 26.6 % — LOW (ref 34.5–45)
HGB BLD-MCNC: 8.5 G/DL — LOW (ref 11.5–15.5)
INR PPP: 1.59 RATIO
MCHC RBC-ENTMCNC: 23.4 PG — LOW (ref 27–34)
MCHC RBC-ENTMCNC: 32 G/DL — SIGNIFICANT CHANGE UP (ref 32–36)
MCV RBC AUTO: 73.2 FL — LOW (ref 80–100)
PLATELET # BLD AUTO: 418 K/UL — HIGH (ref 150–400)
PT BLD: 18.4 SEC
RBC # BLD: 3.63 M/UL — LOW (ref 3.8–5.2)
RBC # FLD: 20.4 % — HIGH (ref 10.3–14.5)
WBC # BLD: 15.7 K/UL — HIGH (ref 3.8–10.5)
WBC # FLD AUTO: 15.7 K/UL — HIGH (ref 3.8–10.5)

## 2019-03-25 PROCEDURE — 99205 OFFICE O/P NEW HI 60 MIN: CPT

## 2019-03-25 RX ORDER — DEXAMETHASONE 4 MG/1
4 TABLET ORAL
Qty: 10 | Refills: 0 | Status: ACTIVE | COMMUNITY
Start: 2019-03-25 | End: 1900-01-01

## 2019-03-25 RX ORDER — ONDANSETRON 8 MG/1
8 TABLET ORAL
Qty: 30 | Refills: 2 | Status: ACTIVE | COMMUNITY
Start: 2019-03-25 | End: 1900-01-01

## 2019-03-25 RX ORDER — PROCHLORPERAZINE MALEATE 10 MG/1
10 TABLET ORAL EVERY 6 HOURS
Qty: 30 | Refills: 2 | Status: ACTIVE | COMMUNITY
Start: 2019-03-25 | End: 1900-01-01

## 2019-03-25 RX ORDER — LOSARTAN POTASSIUM 100 MG/1
100 TABLET, FILM COATED ORAL
Refills: 0 | Status: ACTIVE | COMMUNITY

## 2019-03-26 LAB
ALBUMIN SERPL ELPH-MCNC: 3.6 G/DL
ALP BLD-CCNC: 78 U/L
ALT SERPL-CCNC: 8 U/L
ANION GAP SERPL CALC-SCNC: 18 MMOL/L
AST SERPL-CCNC: 18 U/L
BILIRUB SERPL-MCNC: 0.5 MG/DL
BUN SERPL-MCNC: 17 MG/DL
CALCIUM SERPL-MCNC: 8.9 MG/DL
CANCER AG125 SERPL-ACNC: 14 U/ML
CEA SERPL-MCNC: 0.8 NG/ML
CHLORIDE SERPL-SCNC: 101 MMOL/L
CO2 SERPL-SCNC: 24 MMOL/L
CREAT SERPL-MCNC: 1.49 MG/DL
GLUCOSE SERPL-MCNC: 108 MG/DL
HAV IGM SER QL: NONREACTIVE
HBV CORE IGM SER QL: NONREACTIVE
HBV SURFACE AG SER QL: NONREACTIVE
HCV AB SER QL: NONREACTIVE
HCV S/CO RATIO: 0.11 S/CO
MAGNESIUM SERPL-MCNC: 1.1 MG/DL
POTASSIUM SERPL-SCNC: 3.9 MMOL/L
PROT SERPL-MCNC: 7.5 G/DL
SODIUM SERPL-SCNC: 142 MMOL/L

## 2019-03-28 ENCOUNTER — OUTPATIENT (OUTPATIENT)
Dept: OUTPATIENT SERVICES | Facility: HOSPITAL | Age: 69
LOS: 1 days | Discharge: HOME | End: 2019-03-28

## 2019-03-28 DIAGNOSIS — C57.4 MALIGNANT NEOPLASM OF UTERINE ADNEXA, UNSPECIFIED: ICD-10-CM

## 2019-03-28 DIAGNOSIS — Z98.890 OTHER SPECIFIED POSTPROCEDURAL STATES: Chronic | ICD-10-CM

## 2019-04-01 NOTE — HISTORY OF PRESENT ILLNESS
[Cardiovascular] : Cardiovascular [Constitutional] : Constitutional [ENT] : ENT [Dermatologic] : Dermatologic [Endocrine] : Endocrine [Gastrointestinal] : Gastrointestinal [Genitourinary] : Genitourinary [Gynecologic] : Gynecologic [Infectious] : Infectious [Musculoskeletal] : Musculoskeletal [Neurologic] : Neurologic [Pain] : Pain [Pulmonary] : Pulmonary [Hematologic] : Hematologic [de-identified] : 68 y.o female with newly diagnosed metastatic uterine MMT , here with her son to discuss treatment options.\par \par Patient initially presented to the ER with a syncopal episode. As per patient she started with vaginal bleed six months ago. Bleeding was scan and she did not seek medical help until 2/24/19. On that day she saw Dr. Hatch who told patient that she might have fibroids.\par Af ter the doctors visit , she went to a restaurant to eat, and felt dizzy and passed out.\par She was taken to [facility], where she had scans of C/A/P.\par \par Ct angio of A/P 2/24/19: No abdominal aortic aneurysm or dissection. \par \par Heterogeneous endometrium distended to approximately 6.7 cm. The findings \par could represent cervical carcinoma with complex fluid in the endometrial \par canal versus endometrial carcinoma. Extensive pelvic and retroperitoneal \par lymphadenopathy consistent with metastatic disease. \par \par Focal thickening of the gallbladder wall at the fundus may reflect \par adenomyomatosis. Gallbladder ultrasound is recommended to exclude \par suspicious lesion. \par \par CT chest 2/26/19: Left supraclavicular lymphadenopathy worrisome for metastatic \par disease is amenable to ultrasound-guided biopsy. \par \par 2 nonspecific tiny left lower lobe nodules. \par \par Patient was seen by Gyn Onc surgery and had a biopsy of the prolapsing cervical mass. 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 [de-identified] : Patient still has some vaginal staining. She also c/o some cramps.\par Denies any bowel or bladder issues.\par She is single, lives with her son. 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

## 2019-04-01 NOTE — REASON FOR VISIT
[Initial Consultation] : an initial consultation [Other: _____] : [unfilled] [FreeTextEntry2] : metastatic carcinosarcoma.

## 2019-04-05 ENCOUNTER — APPOINTMENT (OUTPATIENT)
Dept: INFUSION THERAPY | Facility: HOSPITAL | Age: 69
End: 2019-04-05

## 2019-04-05 ENCOUNTER — RESULT REVIEW (OUTPATIENT)
Age: 69
End: 2019-04-05

## 2019-04-05 LAB
ANISOCYTOSIS BLD QL: SLIGHT — SIGNIFICANT CHANGE UP
ELLIPTOCYTES BLD QL SMEAR: SLIGHT — SIGNIFICANT CHANGE UP
EOSINOPHIL # BLD AUTO: 0 K/UL — SIGNIFICANT CHANGE UP (ref 0–0.5)
EOSINOPHIL NFR BLD AUTO: 2 % — SIGNIFICANT CHANGE UP (ref 0–6)
HCT VFR BLD CALC: 25.3 % — LOW (ref 34.5–45)
HGB BLD-MCNC: 8.2 G/DL — LOW (ref 11.5–15.5)
HYPOCHROMIA BLD QL: SLIGHT — SIGNIFICANT CHANGE UP
LYMPHOCYTES # BLD AUTO: 0.7 K/UL — LOW (ref 1–3.3)
LYMPHOCYTES # BLD AUTO: 13 % — SIGNIFICANT CHANGE UP (ref 13–44)
MCHC RBC-ENTMCNC: 23.6 PG — LOW (ref 27–34)
MCHC RBC-ENTMCNC: 32.4 G/DL — SIGNIFICANT CHANGE UP (ref 32–36)
MCV RBC AUTO: 72.9 FL — LOW (ref 80–100)
MICROCYTES BLD QL: SLIGHT — SIGNIFICANT CHANGE UP
MONOCYTES # BLD AUTO: 0.1 K/UL — SIGNIFICANT CHANGE UP (ref 0–0.9)
MONOCYTES NFR BLD AUTO: 3 % — SIGNIFICANT CHANGE UP (ref 2–14)
NEUTROPHILS # BLD AUTO: 8 K/UL — HIGH (ref 1.8–7.4)
NEUTROPHILS NFR BLD AUTO: 82 % — HIGH (ref 43–77)
PLAT MORPH BLD: NORMAL — SIGNIFICANT CHANGE UP
PLATELET # BLD AUTO: 550 K/UL — HIGH (ref 150–400)
POIKILOCYTOSIS BLD QL AUTO: SLIGHT — SIGNIFICANT CHANGE UP
RBC # BLD: 3.47 M/UL — LOW (ref 3.8–5.2)
RBC # FLD: 20.7 % — HIGH (ref 10.3–14.5)
RBC BLD AUTO: ABNORMAL
WBC # BLD: 8.8 K/UL — SIGNIFICANT CHANGE UP (ref 3.8–10.5)
WBC # FLD AUTO: 8.8 K/UL — SIGNIFICANT CHANGE UP (ref 3.8–10.5)

## 2019-04-08 DIAGNOSIS — R11.2 NAUSEA WITH VOMITING, UNSPECIFIED: ICD-10-CM

## 2019-04-08 DIAGNOSIS — Z51.11 ENCOUNTER FOR ANTINEOPLASTIC CHEMOTHERAPY: ICD-10-CM

## 2019-04-08 DIAGNOSIS — E86.0 DEHYDRATION: ICD-10-CM

## 2019-04-08 DIAGNOSIS — C54.1 MALIGNANT NEOPLASM OF ENDOMETRIUM: ICD-10-CM

## 2019-04-12 ENCOUNTER — APPOINTMENT (OUTPATIENT)
Dept: HEMATOLOGY ONCOLOGY | Facility: CLINIC | Age: 69
End: 2019-04-12
Payer: COMMERCIAL

## 2019-04-15 ENCOUNTER — RESULT REVIEW (OUTPATIENT)
Age: 69
End: 2019-04-15

## 2019-04-15 ENCOUNTER — APPOINTMENT (OUTPATIENT)
Dept: HEMATOLOGY ONCOLOGY | Facility: CLINIC | Age: 69
End: 2019-04-15
Payer: COMMERCIAL

## 2019-04-15 VITALS
WEIGHT: 185.19 LBS | RESPIRATION RATE: 17 BRPM | OXYGEN SATURATION: 100 % | TEMPERATURE: 98.1 F | HEART RATE: 99 BPM | DIASTOLIC BLOOD PRESSURE: 90 MMHG | SYSTOLIC BLOOD PRESSURE: 138 MMHG | BODY MASS INDEX: 33.02 KG/M2

## 2019-04-15 LAB
ALBUMIN SERPL ELPH-MCNC: 3.9 G/DL
ALP BLD-CCNC: 111 U/L
ALT SERPL-CCNC: 36 U/L
ANION GAP SERPL CALC-SCNC: 18 MMOL/L
AST SERPL-CCNC: 38 U/L
BASOPHILS # BLD AUTO: 0 K/UL — SIGNIFICANT CHANGE UP (ref 0–0.2)
BILIRUB SERPL-MCNC: 0.2 MG/DL
BUN SERPL-MCNC: 22 MG/DL
CALCIUM SERPL-MCNC: 9.3 MG/DL
CHLORIDE SERPL-SCNC: 106 MMOL/L
CO2 SERPL-SCNC: 21 MMOL/L
CREAT SERPL-MCNC: 1.71 MG/DL
EOSINOPHIL # BLD AUTO: 0 K/UL — SIGNIFICANT CHANGE UP (ref 0–0.5)
GLUCOSE SERPL-MCNC: 120 MG/DL
HCT VFR BLD CALC: 24.9 % — LOW (ref 34.5–45)
HGB BLD-MCNC: 8 G/DL — LOW (ref 11.5–15.5)
LYMPHOCYTES # BLD AUTO: 1.1 K/UL — SIGNIFICANT CHANGE UP (ref 1–3.3)
LYMPHOCYTES # BLD AUTO: 74 % — HIGH (ref 13–44)
MCHC RBC-ENTMCNC: 22.9 PG — LOW (ref 27–34)
MCHC RBC-ENTMCNC: 32.2 G/DL — SIGNIFICANT CHANGE UP (ref 32–36)
MCV RBC AUTO: 71.3 FL — LOW (ref 80–100)
MONOCYTES # BLD AUTO: 0.1 K/UL — SIGNIFICANT CHANGE UP (ref 0–0.9)
MONOCYTES NFR BLD AUTO: 10 % — SIGNIFICANT CHANGE UP (ref 2–14)
NEUTROPHILS # BLD AUTO: 0.2 K/UL — LOW (ref 1.8–7.4)
NEUTROPHILS NFR BLD AUTO: 16 % — LOW (ref 43–77)
PLAT MORPH BLD: NORMAL — SIGNIFICANT CHANGE UP
PLATELET # BLD AUTO: 159 K/UL — SIGNIFICANT CHANGE UP (ref 150–400)
POTASSIUM SERPL-SCNC: 3.7 MMOL/L
PROT SERPL-MCNC: 7.5 G/DL
RBC # BLD: 3.5 M/UL — LOW (ref 3.8–5.2)
RBC # FLD: 20.9 % — HIGH (ref 10.3–14.5)
RBC BLD AUTO: SIGNIFICANT CHANGE UP
SODIUM SERPL-SCNC: 145 MMOL/L
WBC # BLD: 1.4 K/UL — LOW (ref 3.8–10.5)
WBC # FLD AUTO: 1.4 K/UL — LOW (ref 3.8–10.5)

## 2019-04-15 PROCEDURE — 99214 OFFICE O/P EST MOD 30 MIN: CPT

## 2019-04-16 LAB
CANCER AG125 SERPL-ACNC: 23 U/ML
CEA SERPL-MCNC: 1.1 NG/ML
MAGNESIUM SERPL-MCNC: 1.3 MG/DL

## 2019-04-16 NOTE — HISTORY OF PRESENT ILLNESS
[Cardiovascular] : Cardiovascular [Constitutional] : Constitutional [ENT] : ENT [Dermatologic] : Dermatologic [Endocrine] : Endocrine [Gastrointestinal] : Gastrointestinal [Genitourinary] : Genitourinary [Gynecologic] : Gynecologic [Infectious] : Infectious [Musculoskeletal] : Musculoskeletal [Pain] : Pain [Neurologic] : Neurologic [Hematologic] : Hematologic [Pulmonary] : Pulmonary [Treatment Protocol] : Treatment Protocol [de-identified] : 68 y.o female with newly diagnosed metastatic uterine MMT , here with her son to discuss treatment options.\par \par Patient initially presented to the ER with a syncopal episode. As per patient she started with vaginal bleed six months ago. Bleeding was scan and she did not seek medical help until 2/24/19. On that day she saw Dr. Hatch who told patient that she might have fibroids.\par Af ter the doctors visit , she went to a restaurant to eat, and felt dizzy and passed out.\par She was taken to [facility], where she had scans of C/A/P.\par \par Ct angio of A/P 2/24/19: No abdominal aortic aneurysm or dissection. \par \par Heterogeneous endometrium distended to approximately 6.7 cm. The findings \par could represent cervical carcinoma with complex fluid in the endometrial \par canal versus endometrial carcinoma. Extensive pelvic and retroperitoneal \par lymphadenopathy consistent with metastatic disease. \par \par Focal thickening of the gallbladder wall at the fundus may reflect \par adenomyomatosis. Gallbladder ultrasound is recommended to exclude \par suspicious lesion. \par \par CT chest 2/26/19: Left supraclavicular lymphadenopathy worrisome for metastatic \par disease is amenable to ultrasound-guided biopsy. \par \par 2 nonspecific tiny left lower lobe nodules. \par \par Patient was seen by Gyn Onc surgery and had a biopsy of the prolapsing cervical mass. 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 [FreeTextEntry1] : Carboplatin and Taxol\par C1 - 4/5/19 [de-identified] : Patient is here for follow up after first treatment.  She is tearful and reports having a difficult time after treatment.  Her appetite is decreased and she has lost weight.  She has had some intermittent nausea and vomiting.  She has mild constipation, small bowel movements every other day.  She is very sensitive to smell and has lost her taste, has difficulty finding foods that she likes.  Neuropathy of fingertips and toes, constant in hands and intermittent in toes. She denies fever, chills, chest pain, SOB, swelling.

## 2019-04-16 NOTE — REVIEW OF SYSTEMS
[Negative] : Allergic/Immunologic [Fatigue] : fatigue [Constipation] : constipation [Vomiting] : vomiting [FreeTextEntry7] : nausea

## 2019-04-16 NOTE — ASSESSMENT
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  Recommend [facility] for 3 days, patient has no ride today, will come tomorrow.  Discussed infection precautions in detail, all questions answered.  Will go to ED if fever develops.\par Anemia - symptomatic, recommend blood transfusion.  Patient will think about it and let me know by tomorrow. Will go to ED if symptoms worsen.\par Will decrease Carboplatin with next cycle.\par Neuropathy - will decrease Taxol with next cycle.\par Will get scans after 3 cycles to evaluate for treatment response or sooner if clinically indicated.\par \par

## 2019-04-17 ENCOUNTER — RESULT REVIEW (OUTPATIENT)
Age: 69
End: 2019-04-17

## 2019-04-17 ENCOUNTER — APPOINTMENT (OUTPATIENT)
Dept: INFUSION THERAPY | Facility: HOSPITAL | Age: 69
End: 2019-04-17

## 2019-04-17 LAB
BASOPHILS # BLD AUTO: 0 K/UL — SIGNIFICANT CHANGE UP (ref 0–0.2)
EOSINOPHIL # BLD AUTO: 0 K/UL — SIGNIFICANT CHANGE UP (ref 0–0.5)
EOSINOPHIL NFR BLD AUTO: 2 % — SIGNIFICANT CHANGE UP (ref 0–6)
HCT VFR BLD CALC: 22.6 % — LOW (ref 34.5–45)
HGB BLD-MCNC: 7.6 G/DL — LOW (ref 11.5–15.5)
LYMPHOCYTES # BLD AUTO: 1.2 K/UL — SIGNIFICANT CHANGE UP (ref 1–3.3)
LYMPHOCYTES # BLD AUTO: 64 % — HIGH (ref 13–44)
MCHC RBC-ENTMCNC: 23.9 PG — LOW (ref 27–34)
MCHC RBC-ENTMCNC: 33.7 G/DL — SIGNIFICANT CHANGE UP (ref 32–36)
MCV RBC AUTO: 70.8 FL — LOW (ref 80–100)
MONOCYTES # BLD AUTO: 0.3 K/UL — SIGNIFICANT CHANGE UP (ref 0–0.9)
MONOCYTES NFR BLD AUTO: 15 % — HIGH (ref 2–14)
NEUTROPHILS # BLD AUTO: 0.5 K/UL — LOW (ref 1.8–7.4)
NEUTROPHILS NFR BLD AUTO: 17 % — LOW (ref 43–77)
NEUTS BAND # BLD: 2 % — SIGNIFICANT CHANGE UP (ref 0–8)
PLAT MORPH BLD: NORMAL — SIGNIFICANT CHANGE UP
PLATELET # BLD AUTO: 60 K/UL — LOW (ref 150–400)
RBC # BLD: 3.2 M/UL — LOW (ref 3.8–5.2)
RBC # FLD: 20.8 % — HIGH (ref 10.3–14.5)
RBC BLD AUTO: SIGNIFICANT CHANGE UP
WBC # BLD: 2 K/UL — LOW (ref 3.8–10.5)
WBC # FLD AUTO: 2 K/UL — LOW (ref 3.8–10.5)

## 2019-04-18 DIAGNOSIS — Z51.89 ENCOUNTER FOR OTHER SPECIFIED AFTERCARE: ICD-10-CM

## 2019-04-26 ENCOUNTER — RESULT REVIEW (OUTPATIENT)
Age: 69
End: 2019-04-26

## 2019-04-26 ENCOUNTER — APPOINTMENT (OUTPATIENT)
Dept: INFUSION THERAPY | Facility: HOSPITAL | Age: 69
End: 2019-04-26

## 2019-04-26 ENCOUNTER — OUTPATIENT (OUTPATIENT)
Dept: OUTPATIENT SERVICES | Facility: HOSPITAL | Age: 69
LOS: 1 days | End: 2019-04-26
Payer: COMMERCIAL

## 2019-04-26 ENCOUNTER — OUTPATIENT (OUTPATIENT)
Dept: OUTPATIENT SERVICES | Facility: HOSPITAL | Age: 69
LOS: 1 days | Discharge: HOME | End: 2019-04-26

## 2019-04-26 DIAGNOSIS — Z98.890 OTHER SPECIFIED POSTPROCEDURAL STATES: Chronic | ICD-10-CM

## 2019-04-26 DIAGNOSIS — C54.1 MALIGNANT NEOPLASM OF ENDOMETRIUM: ICD-10-CM

## 2019-04-26 DIAGNOSIS — C57.4 MALIGNANT NEOPLASM OF UTERINE ADNEXA, UNSPECIFIED: ICD-10-CM

## 2019-04-26 LAB
ANISOCYTOSIS BLD QL: SLIGHT — SIGNIFICANT CHANGE UP
BLD GP AB SCN SERPL QL: NEGATIVE — SIGNIFICANT CHANGE UP
BUN SERPL-MCNC: 14 MG/DL — SIGNIFICANT CHANGE UP (ref 7–23)
CA-I BLDA-SCNC: 1.06 MMOL/L — LOW (ref 1.12–1.3)
CHLORIDE SERPL-SCNC: 106 MMOL/L — SIGNIFICANT CHANGE UP (ref 96–108)
CO2 SERPL-SCNC: 30 MMOL/L — SIGNIFICANT CHANGE UP (ref 22–31)
CREAT SERPL-MCNC: 1.8 MG/DL — HIGH (ref 0.5–1.3)
ELLIPTOCYTES BLD QL SMEAR: SLIGHT — SIGNIFICANT CHANGE UP
EOSINOPHIL # BLD AUTO: 0 K/UL — SIGNIFICANT CHANGE UP (ref 0–0.5)
EOSINOPHIL NFR BLD AUTO: 2 % — SIGNIFICANT CHANGE UP (ref 0–6)
GLUCOSE SERPL-MCNC: 105 MG/DL — HIGH (ref 70–99)
HCT VFR BLD CALC: 20.4 % — CRITICAL LOW (ref 34.5–45)
HGB BLD-MCNC: 7 G/DL — CRITICAL LOW (ref 11.5–15.5)
HYPOCHROMIA BLD QL: SLIGHT — SIGNIFICANT CHANGE UP
LYMPHOCYTES # BLD AUTO: 2.5 K/UL — SIGNIFICANT CHANGE UP (ref 1–3.3)
LYMPHOCYTES # BLD AUTO: 43 % — SIGNIFICANT CHANGE UP (ref 13–44)
MCHC RBC-ENTMCNC: 24 PG — LOW (ref 27–34)
MCHC RBC-ENTMCNC: 34.4 G/DL — SIGNIFICANT CHANGE UP (ref 32–36)
MCV RBC AUTO: 69.9 FL — LOW (ref 80–100)
MICROCYTES BLD QL: SLIGHT — SIGNIFICANT CHANGE UP
MONOCYTES # BLD AUTO: 0.4 K/UL — SIGNIFICANT CHANGE UP (ref 0–0.9)
MONOCYTES NFR BLD AUTO: 7 % — SIGNIFICANT CHANGE UP (ref 2–14)
NEUTROPHILS # BLD AUTO: 2.2 K/UL — SIGNIFICANT CHANGE UP (ref 1.8–7.4)
NEUTROPHILS NFR BLD AUTO: 48 % — SIGNIFICANT CHANGE UP (ref 43–77)
PLAT MORPH BLD: NORMAL — SIGNIFICANT CHANGE UP
PLATELET # BLD AUTO: 71 K/UL — LOW (ref 150–400)
POIKILOCYTOSIS BLD QL AUTO: SLIGHT — SIGNIFICANT CHANGE UP
POTASSIUM SERPL-MCNC: 3.5 MMOL/L — SIGNIFICANT CHANGE UP (ref 3.5–5.3)
POTASSIUM SERPL-SCNC: 3.5 MMOL/L — SIGNIFICANT CHANGE UP (ref 3.5–5.3)
RBC # BLD: 2.92 M/UL — LOW (ref 3.8–5.2)
RBC # FLD: 21.5 % — HIGH (ref 10.3–14.5)
RBC BLD AUTO: ABNORMAL
RH IG SCN BLD-IMP: POSITIVE — SIGNIFICANT CHANGE UP
RH IG SCN BLD-IMP: POSITIVE — SIGNIFICANT CHANGE UP
SODIUM SERPL-SCNC: 146 MMOL/L — HIGH (ref 135–145)
WBC # BLD: 5.1 K/UL — SIGNIFICANT CHANGE UP (ref 3.8–10.5)
WBC # FLD AUTO: 5.1 K/UL — SIGNIFICANT CHANGE UP (ref 3.8–10.5)

## 2019-04-26 PROCEDURE — 86850 RBC ANTIBODY SCREEN: CPT

## 2019-04-26 PROCEDURE — 86901 BLOOD TYPING SEROLOGIC RH(D): CPT

## 2019-04-26 PROCEDURE — 86923 COMPATIBILITY TEST ELECTRIC: CPT

## 2019-04-26 PROCEDURE — 86900 BLOOD TYPING SEROLOGIC ABO: CPT

## 2019-04-27 ENCOUNTER — APPOINTMENT (OUTPATIENT)
Dept: INFUSION THERAPY | Facility: HOSPITAL | Age: 69
End: 2019-04-27

## 2019-04-29 ENCOUNTER — APPOINTMENT (OUTPATIENT)
Dept: INFUSION THERAPY | Facility: HOSPITAL | Age: 69
End: 2019-04-29

## 2019-04-29 DIAGNOSIS — E86.0 DEHYDRATION: ICD-10-CM

## 2019-04-30 DIAGNOSIS — Z51.89 ENCOUNTER FOR OTHER SPECIFIED AFTERCARE: ICD-10-CM

## 2019-05-03 ENCOUNTER — APPOINTMENT (OUTPATIENT)
Dept: HEMATOLOGY ONCOLOGY | Facility: CLINIC | Age: 69
End: 2019-05-03
Payer: COMMERCIAL

## 2019-05-03 ENCOUNTER — APPOINTMENT (OUTPATIENT)
Dept: INFUSION THERAPY | Facility: HOSPITAL | Age: 69
End: 2019-05-03

## 2019-05-10 ENCOUNTER — APPOINTMENT (OUTPATIENT)
Dept: HEMATOLOGY ONCOLOGY | Facility: CLINIC | Age: 69
End: 2019-05-10
Payer: COMMERCIAL

## 2019-05-10 ENCOUNTER — RESULT REVIEW (OUTPATIENT)
Age: 69
End: 2019-05-10

## 2019-05-10 VITALS
WEIGHT: 187.39 LBS | DIASTOLIC BLOOD PRESSURE: 108 MMHG | RESPIRATION RATE: 16 BRPM | BODY MASS INDEX: 33.41 KG/M2 | SYSTOLIC BLOOD PRESSURE: 161 MMHG | HEART RATE: 108 BPM | TEMPERATURE: 99.1 F | OXYGEN SATURATION: 98 %

## 2019-05-10 LAB
CANCER AG125 SERPL-ACNC: 25 U/ML
CEA SERPL-MCNC: 1.4 NG/ML
HCT VFR BLD CALC: 28.3 % — LOW (ref 34.5–45)
HGB BLD-MCNC: 9.4 G/DL — LOW (ref 11.5–15.5)
MCHC RBC-ENTMCNC: 25.8 PG — LOW (ref 27–34)
MCHC RBC-ENTMCNC: 33.3 G/DL — SIGNIFICANT CHANGE UP (ref 32–36)
MCV RBC AUTO: 77.4 FL — LOW (ref 80–100)
PLATELET # BLD AUTO: 399 K/UL — SIGNIFICANT CHANGE UP (ref 150–400)
RBC # BLD: 3.65 M/UL — LOW (ref 3.8–5.2)
RBC # FLD: 26.5 % — HIGH (ref 10.3–14.5)
WBC # BLD: 9.1 K/UL — SIGNIFICANT CHANGE UP (ref 3.8–10.5)
WBC # FLD AUTO: 9.1 K/UL — SIGNIFICANT CHANGE UP (ref 3.8–10.5)

## 2019-05-10 PROCEDURE — 99214 OFFICE O/P EST MOD 30 MIN: CPT

## 2019-05-13 LAB
ALBUMIN SERPL ELPH-MCNC: 4.1 G/DL
ALP BLD-CCNC: 110 U/L
ALT SERPL-CCNC: 8 U/L
ANION GAP SERPL CALC-SCNC: 15 MMOL/L
AST SERPL-CCNC: 15 U/L
BILIRUB SERPL-MCNC: 0.2 MG/DL
BUN SERPL-MCNC: 20 MG/DL
CALCIUM SERPL-MCNC: 9.7 MG/DL
CHLORIDE SERPL-SCNC: 107 MMOL/L
CO2 SERPL-SCNC: 24 MMOL/L
CREAT SERPL-MCNC: 1.46 MG/DL
GLUCOSE SERPL-MCNC: 142 MG/DL
MAGNESIUM SERPL-MCNC: 1.5 MG/DL
POTASSIUM SERPL-SCNC: 3.7 MMOL/L
PROT SERPL-MCNC: 7.8 G/DL
SODIUM SERPL-SCNC: 146 MMOL/L

## 2019-05-13 NOTE — REVIEW OF SYSTEMS
[Fatigue] : fatigue [Vomiting] : vomiting [Constipation] : constipation [Negative] : Allergic/Immunologic [FreeTextEntry7] : nausea

## 2019-05-13 NOTE — HISTORY OF PRESENT ILLNESS
[Treatment Protocol] : Treatment Protocol [Cardiovascular] : Cardiovascular [ENT] : ENT [Constitutional] : Constitutional [Dermatologic] : Dermatologic [Endocrine] : Endocrine [Gastrointestinal] : Gastrointestinal [Genitourinary] : Genitourinary [Gynecologic] : Gynecologic [Infectious] : Infectious [Musculoskeletal] : Musculoskeletal [Neurologic] : Neurologic [Pain] : Pain [Pulmonary] : Pulmonary [Hematologic] : Hematologic [de-identified] : 68 y.o female with newly diagnosed metastatic uterine MMT , here with her son to discuss treatment options.\par \par Patient initially presented to the ER with a syncopal episode. As per patient she started with vaginal bleed six months ago. Bleeding was scan and she did not seek medical help until 2/24/19. On that day she saw Dr. Hatch who told patient that she might have fibroids.\par Af ter the doctors visit , she went to a restaurant to eat, and felt dizzy and passed out.\par She was taken to [facility], where she had scans of C/A/P.\par \par Ct angio of A/P 2/24/19: No abdominal aortic aneurysm or dissection. \par \par Heterogeneous endometrium distended to approximately 6.7 cm. The findings \par could represent cervical carcinoma with complex fluid in the endometrial \par canal versus endometrial carcinoma. Extensive pelvic and retroperitoneal \par lymphadenopathy consistent with metastatic disease. \par \par Focal thickening of the gallbladder wall at the fundus may reflect \par adenomyomatosis. Gallbladder ultrasound is recommended to exclude \par suspicious lesion. \par \par CT chest 2/26/19: Left supraclavicular lymphadenopathy worrisome for metastatic \par disease is amenable to ultrasound-guided biopsy. \par \par 2 nonspecific tiny left lower lobe nodules. \par \par Patient was seen by Gyn Onc surgery and had a biopsy of the prolapsing cervical mass. 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 [FreeTextEntry1] : Carboplatin and Taxol\par C1 - 4/5/19 [de-identified] : Patient is here for follow up and blood work.  She did not have her second cycle of chemotherapy because she did not have transportation, scheduled for 5/14.  She is feeling better.  Pain has decreased and weakness is improved.  Appetite is better.  She complains of some tingling to her b/l feet only at night.  Denies difficulty walking.  Denies fever, chills, chest pain, SOB, bloating, nausea, vomiting, change on bowel habits.

## 2019-05-13 NOTE — ASSESSMENT
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  Hgb 9.4 today.\par Hypomagnesemia - improving. Continue IV MgCl with treatment. \par Second cycle next week.\par Will gets scans after third cycle to evaluate for treatment response.\par \par \par

## 2019-05-14 ENCOUNTER — RESULT REVIEW (OUTPATIENT)
Age: 69
End: 2019-05-14

## 2019-05-14 ENCOUNTER — APPOINTMENT (OUTPATIENT)
Dept: INFUSION THERAPY | Facility: HOSPITAL | Age: 69
End: 2019-05-14

## 2019-05-14 LAB
BASOPHILS # BLD AUTO: 0 K/UL — SIGNIFICANT CHANGE UP (ref 0–0.2)
BASOPHILS NFR BLD AUTO: 0.4 % — SIGNIFICANT CHANGE UP (ref 0–2)
BUN SERPL-MCNC: 17 MG/DL — SIGNIFICANT CHANGE UP (ref 7–23)
CA-I BLDA-SCNC: 1.2 MMOL/L — SIGNIFICANT CHANGE UP (ref 1.12–1.3)
CHLORIDE SERPL-SCNC: 109 MMOL/L — HIGH (ref 96–108)
CO2 SERPL-SCNC: 27 MMOL/L — SIGNIFICANT CHANGE UP (ref 22–31)
CREAT SERPL-MCNC: 1.6 MG/DL — HIGH (ref 0.5–1.3)
EOSINOPHIL # BLD AUTO: 0 K/UL — SIGNIFICANT CHANGE UP (ref 0–0.5)
EOSINOPHIL NFR BLD AUTO: 0 % — SIGNIFICANT CHANGE UP (ref 0–6)
GLUCOSE SERPL-MCNC: 96 MG/DL — SIGNIFICANT CHANGE UP (ref 70–99)
HCT VFR BLD CALC: 27.7 % — LOW (ref 34.5–45)
HGB BLD-MCNC: 9.4 G/DL — LOW (ref 11.5–15.5)
LYMPHOCYTES # BLD AUTO: 2.8 K/UL — SIGNIFICANT CHANGE UP (ref 1–3.3)
LYMPHOCYTES # BLD AUTO: 36 % — SIGNIFICANT CHANGE UP (ref 13–44)
MCHC RBC-ENTMCNC: 26.1 PG — LOW (ref 27–34)
MCHC RBC-ENTMCNC: 33.8 G/DL — SIGNIFICANT CHANGE UP (ref 32–36)
MCV RBC AUTO: 77.2 FL — LOW (ref 80–100)
MONOCYTES # BLD AUTO: 0.5 K/UL — SIGNIFICANT CHANGE UP (ref 0–0.9)
MONOCYTES NFR BLD AUTO: 6.7 % — SIGNIFICANT CHANGE UP (ref 2–14)
NEUTROPHILS # BLD AUTO: 4.5 K/UL — SIGNIFICANT CHANGE UP (ref 1.8–7.4)
NEUTROPHILS NFR BLD AUTO: 56.9 % — SIGNIFICANT CHANGE UP (ref 43–77)
PLATELET # BLD AUTO: 395 K/UL — SIGNIFICANT CHANGE UP (ref 150–400)
POTASSIUM SERPL-MCNC: 3.7 MMOL/L — SIGNIFICANT CHANGE UP (ref 3.5–5.3)
POTASSIUM SERPL-SCNC: 3.7 MMOL/L — SIGNIFICANT CHANGE UP (ref 3.5–5.3)
RBC # BLD: 3.59 M/UL — LOW (ref 3.8–5.2)
RBC # FLD: 26.9 % — HIGH (ref 10.3–14.5)
SODIUM SERPL-SCNC: 147 MMOL/L — HIGH (ref 135–145)
WBC # BLD: 7.9 K/UL — SIGNIFICANT CHANGE UP (ref 3.8–10.5)
WBC # FLD AUTO: 7.9 K/UL — SIGNIFICANT CHANGE UP (ref 3.8–10.5)

## 2019-05-15 DIAGNOSIS — Z51.11 ENCOUNTER FOR ANTINEOPLASTIC CHEMOTHERAPY: ICD-10-CM

## 2019-05-17 NOTE — DISCHARGE NOTE ADULT - NSCORESITESY/N_GEN_A_CORE_RD
Spoke with patient. She stated she is feeling fine. Denies any shakiness, dizziness, sweating, etc.  Wants to know what she should do?         Please advise No

## 2019-05-24 ENCOUNTER — APPOINTMENT (OUTPATIENT)
Dept: HEMATOLOGY ONCOLOGY | Facility: CLINIC | Age: 69
End: 2019-05-24

## 2019-05-28 ENCOUNTER — OUTPATIENT (OUTPATIENT)
Dept: OUTPATIENT SERVICES | Facility: HOSPITAL | Age: 69
LOS: 1 days | Discharge: HOME | End: 2019-05-28

## 2019-05-28 DIAGNOSIS — C57.4 MALIGNANT NEOPLASM OF UTERINE ADNEXA, UNSPECIFIED: ICD-10-CM

## 2019-05-28 DIAGNOSIS — Z98.890 OTHER SPECIFIED POSTPROCEDURAL STATES: Chronic | ICD-10-CM

## 2019-05-31 ENCOUNTER — RESULT REVIEW (OUTPATIENT)
Age: 69
End: 2019-05-31

## 2019-05-31 ENCOUNTER — APPOINTMENT (OUTPATIENT)
Dept: HEMATOLOGY ONCOLOGY | Facility: CLINIC | Age: 69
End: 2019-05-31
Payer: COMMERCIAL

## 2019-05-31 VITALS
RESPIRATION RATE: 15 BRPM | DIASTOLIC BLOOD PRESSURE: 85 MMHG | BODY MASS INDEX: 34.04 KG/M2 | TEMPERATURE: 99.1 F | SYSTOLIC BLOOD PRESSURE: 130 MMHG | HEART RATE: 110 BPM | OXYGEN SATURATION: 100 % | WEIGHT: 190.92 LBS

## 2019-05-31 LAB
HCT VFR BLD CALC: 23.3 % — LOW (ref 34.5–45)
HGB BLD-MCNC: 7.8 G/DL — LOW (ref 11.5–15.5)
MCHC RBC-ENTMCNC: 27.4 PG — SIGNIFICANT CHANGE UP (ref 27–34)
MCHC RBC-ENTMCNC: 33.5 G/DL — SIGNIFICANT CHANGE UP (ref 32–36)
MCV RBC AUTO: 81.8 FL — SIGNIFICANT CHANGE UP (ref 80–100)
PLATELET # BLD AUTO: 247 K/UL — SIGNIFICANT CHANGE UP (ref 150–400)
RBC # BLD: 2.85 M/UL — LOW (ref 3.8–5.2)
RBC # FLD: 28.3 % — HIGH (ref 10.3–14.5)
WBC # BLD: 7.7 K/UL — SIGNIFICANT CHANGE UP (ref 3.8–10.5)
WBC # FLD AUTO: 7.7 K/UL — SIGNIFICANT CHANGE UP (ref 3.8–10.5)

## 2019-05-31 PROCEDURE — 99214 OFFICE O/P EST MOD 30 MIN: CPT

## 2019-06-03 ENCOUNTER — APPOINTMENT (OUTPATIENT)
Dept: HEMATOLOGY ONCOLOGY | Facility: CLINIC | Age: 69
End: 2019-06-03

## 2019-06-03 LAB
ALBUMIN SERPL ELPH-MCNC: 4.3 G/DL
ALP BLD-CCNC: 98 U/L
ALT SERPL-CCNC: 11 U/L
ANION GAP SERPL CALC-SCNC: 16 MMOL/L
AST SERPL-CCNC: 16 U/L
BILIRUB SERPL-MCNC: 0.2 MG/DL
BUN SERPL-MCNC: 21 MG/DL
CALCIUM SERPL-MCNC: 9.4 MG/DL
CANCER AG125 SERPL-ACNC: 33 U/ML
CEA SERPL-MCNC: 1.5 NG/ML
CHLORIDE SERPL-SCNC: 110 MMOL/L
CO2 SERPL-SCNC: 22 MMOL/L
CREAT SERPL-MCNC: 1.47 MG/DL
GLUCOSE SERPL-MCNC: 150 MG/DL
MAGNESIUM SERPL-MCNC: 1.6 MG/DL
POTASSIUM SERPL-SCNC: 3.6 MMOL/L
PROT SERPL-MCNC: 7.2 G/DL
SODIUM SERPL-SCNC: 148 MMOL/L

## 2019-06-04 ENCOUNTER — OUTPATIENT (OUTPATIENT)
Dept: OUTPATIENT SERVICES | Facility: HOSPITAL | Age: 69
LOS: 1 days | End: 2019-06-04
Payer: COMMERCIAL

## 2019-06-04 ENCOUNTER — RESULT REVIEW (OUTPATIENT)
Age: 69
End: 2019-06-04

## 2019-06-04 ENCOUNTER — OTHER (OUTPATIENT)
Age: 69
End: 2019-06-04

## 2019-06-04 ENCOUNTER — LABORATORY RESULT (OUTPATIENT)
Age: 69
End: 2019-06-04

## 2019-06-04 ENCOUNTER — APPOINTMENT (OUTPATIENT)
Dept: INFUSION THERAPY | Facility: HOSPITAL | Age: 69
End: 2019-06-04

## 2019-06-04 DIAGNOSIS — Z98.890 OTHER SPECIFIED POSTPROCEDURAL STATES: Chronic | ICD-10-CM

## 2019-06-04 LAB
BASOPHILS # BLD AUTO: 0 K/UL — SIGNIFICANT CHANGE UP (ref 0–0.2)
BASOPHILS NFR BLD AUTO: 0.3 % — SIGNIFICANT CHANGE UP (ref 0–2)
BLD GP AB SCN SERPL QL: NEGATIVE — SIGNIFICANT CHANGE UP
EOSINOPHIL # BLD AUTO: 0 K/UL — SIGNIFICANT CHANGE UP (ref 0–0.5)
EOSINOPHIL NFR BLD AUTO: 0 % — SIGNIFICANT CHANGE UP (ref 0–6)
HCT VFR BLD CALC: 22.5 % — LOW (ref 34.5–45)
HGB BLD-MCNC: 7.6 G/DL — LOW (ref 11.5–15.5)
LYMPHOCYTES # BLD AUTO: 3.2 K/UL — SIGNIFICANT CHANGE UP (ref 1–3.3)
LYMPHOCYTES # BLD AUTO: 34 % — SIGNIFICANT CHANGE UP (ref 13–44)
MCHC RBC-ENTMCNC: 27.7 PG — SIGNIFICANT CHANGE UP (ref 27–34)
MCHC RBC-ENTMCNC: 33.8 G/DL — SIGNIFICANT CHANGE UP (ref 32–36)
MCV RBC AUTO: 81.9 FL — SIGNIFICANT CHANGE UP (ref 80–100)
MONOCYTES # BLD AUTO: 0.8 K/UL — SIGNIFICANT CHANGE UP (ref 0–0.9)
MONOCYTES NFR BLD AUTO: 8 % — SIGNIFICANT CHANGE UP (ref 2–14)
NEUTROPHILS # BLD AUTO: 5.5 K/UL — SIGNIFICANT CHANGE UP (ref 1.8–7.4)
NEUTROPHILS NFR BLD AUTO: 57.7 % — SIGNIFICANT CHANGE UP (ref 43–77)
PLATELET # BLD AUTO: 272 K/UL — SIGNIFICANT CHANGE UP (ref 150–400)
RBC # BLD: 2.75 M/UL — LOW (ref 3.8–5.2)
RBC # FLD: 29 % — HIGH (ref 10.3–14.5)
RH IG SCN BLD-IMP: POSITIVE — SIGNIFICANT CHANGE UP
WBC # BLD: 9.5 K/UL — SIGNIFICANT CHANGE UP (ref 3.8–10.5)
WBC # FLD AUTO: 9.5 K/UL — SIGNIFICANT CHANGE UP (ref 3.8–10.5)

## 2019-06-05 ENCOUNTER — OTHER (OUTPATIENT)
Age: 69
End: 2019-06-05

## 2019-06-06 NOTE — ASSESSMENT
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  Continue to monitor cbc weekly.\par \par Hypomagnesemia - improving. Continue IV MgCl with treatment. \par \par Will get mediport due to poor IV access.\par \par Will gets scans after third cycle to evaluate for treatment response.\par \par \par

## 2019-06-06 NOTE — HISTORY OF PRESENT ILLNESS
[Treatment Protocol] : Treatment Protocol [Cardiovascular] : Cardiovascular [Constitutional] : Constitutional [ENT] : ENT [Dermatologic] : Dermatologic [Endocrine] : Endocrine [Gastrointestinal] : Gastrointestinal [Genitourinary] : Genitourinary [Gynecologic] : Gynecologic [Infectious] : Infectious [Musculoskeletal] : Musculoskeletal [Neurologic] : Neurologic [Pain] : Pain [Pulmonary] : Pulmonary [Hematologic] : Hematologic [FreeTextEntry1] : Carboplatin and Taxol\par C1 - 4/5/19\par C2 - 5/14/19 [de-identified] : 68 y.o female with newly diagnosed metastatic uterine MMT , here with her son to discuss treatment options.\par \par Patient initially presented to the ER with a syncopal episode. As per patient she started with vaginal bleed six months ago. Bleeding was scan and she did not seek medical help until 2/24/19. On that day she saw Dr. Hatch who told patient that she might have fibroids.\par Af ter the doctors visit , she went to a restaurant to eat, and felt dizzy and passed out.\par She was taken to [facility], where she had scans of C/A/P.\par \par Ct angio of A/P 2/24/19: No abdominal aortic aneurysm or dissection. \par \par Heterogeneous endometrium distended to approximately 6.7 cm. The findings \par could represent cervical carcinoma with complex fluid in the endometrial \par canal versus endometrial carcinoma. Extensive pelvic and retroperitoneal \par lymphadenopathy consistent with metastatic disease. \par \par Focal thickening of the gallbladder wall at the fundus may reflect \par adenomyomatosis. Gallbladder ultrasound is recommended to exclude \par suspicious lesion. \par \par CT chest 2/26/19: Left supraclavicular lymphadenopathy worrisome for metastatic \par disease is amenable to ultrasound-guided biopsy. \par \par 2 nonspecific tiny left lower lobe nodules. \par \par Patient was seen by Gyn Onc surgery and had a biopsy of the prolapsing cervical mass. 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 [de-identified] : Patient is here for follow up after second cycle.  Tolerated this cycle better than the first cycle.  Her abdominal pain has lessened and her bleeding is very light, no episodes of heavy bleeding.  Appetite has improved. Mild constipation is controlled with OTC meds.  She denies fever, chills, chest pain, SOB, nausea, vomiting, diarrhea, neuropathy.

## 2019-06-07 ENCOUNTER — APPOINTMENT (OUTPATIENT)
Dept: INFUSION THERAPY | Facility: HOSPITAL | Age: 69
End: 2019-06-07

## 2019-06-07 DIAGNOSIS — C57.4 MALIGNANT NEOPLASM OF UTERINE ADNEXA, UNSPECIFIED: ICD-10-CM

## 2019-06-07 NOTE — PAST MEDICAL HISTORY
[Malignancy] : Malignancy [Increasing age ( >40 years old)] : Increasing age ( >40 years old) [No therapy indicated for cases scheduled for less than one hour] : No therapy indicated for cases scheduled for less than one hour. [FreeTextEntry1] : Malignant Hyperthermia Screening Tool and Risk of Bleeding Assessment \par \par \par \par Ms. SILVIA LEAVITT denies family of unexpected death following Anesthesia or Exercise.\par Denies Family history of Malignant Hyperthermia, Muscle or Neuromuscular disorder and High Temperature  following exercise.\par \par Ms. SILVIA LEAVITT denies history of Muscle Spasm, Dark or Chocolate- Colored and Unanticipated fever immediately following anaesthesia or serious exercise.\par Ms. SILVIA LEAVITT also denies bleeding tendencies/Risks of Bleeding.\par

## 2019-06-07 NOTE — HISTORY OF PRESENT ILLNESS
[FreeTextEntry1] : alert and oriented x 3 \par accompanied by\par no reported falls  [FreeTextEntry4] : n/a [FreeTextEntry5] : yesterday [FreeTextEntry6] : Dr Anderson

## 2019-06-07 NOTE — PROCEDURE
[FreeTextEntry1] : right chest wall port insertion  [D/C IV on discharge] : D/C IV on discharge [Resume diet] : resume diet [Vital signs on admission the q 15 mins x2] : Vital signs on admission the q 15 mins x2 [Site check for bleeding/hematoma] : Site check for bleeding/hematoma

## 2019-06-10 ENCOUNTER — APPOINTMENT (OUTPATIENT)
Dept: ENDOVASCULAR SURGERY | Facility: CLINIC | Age: 69
End: 2019-06-10
Payer: COMMERCIAL

## 2019-06-10 DIAGNOSIS — Z51.89 ENCOUNTER FOR OTHER SPECIFIED AFTERCARE: ICD-10-CM

## 2019-06-16 ENCOUNTER — FORM ENCOUNTER (OUTPATIENT)
Age: 69
End: 2019-06-16

## 2019-06-17 ENCOUNTER — APPOINTMENT (OUTPATIENT)
Dept: ENDOVASCULAR SURGERY | Facility: CLINIC | Age: 69
End: 2019-06-17
Payer: COMMERCIAL

## 2019-06-17 PROCEDURE — 77001 FLUOROGUIDE FOR VEIN DEVICE: CPT

## 2019-06-17 PROCEDURE — 76937 US GUIDE VASCULAR ACCESS: CPT

## 2019-06-17 PROCEDURE — 36561 INSERT TUNNELED CV CATH: CPT

## 2019-06-17 NOTE — PROCEDURE
[D/C IV on discharge] : D/C IV on discharge [Vital signs on admission the q 15 mins x2] : Vital signs on admission the q 15 mins x2 [Resume diet] : resume diet [Site check for bleeding/hematoma] : Site check for bleeding/hematoma [FreeTextEntry1] : right chest wall port insertion

## 2019-06-17 NOTE — HISTORY OF PRESENT ILLNESS
[FreeTextEntry1] : alert and oriented x 3 \par accompanied by boyfriend\par no reported falls \par no medications today\par uterine Ca, needs port\par  [FreeTextEntry5] : yesterday at 7 pm [FreeTextEntry4] : n/a [FreeTextEntry6] : Dr Anderson

## 2019-06-20 ENCOUNTER — RESULT REVIEW (OUTPATIENT)
Age: 69
End: 2019-06-20

## 2019-06-20 ENCOUNTER — APPOINTMENT (OUTPATIENT)
Dept: INFUSION THERAPY | Facility: HOSPITAL | Age: 69
End: 2019-06-20

## 2019-06-20 LAB
BASOPHILS # BLD AUTO: 0.1 K/UL — SIGNIFICANT CHANGE UP (ref 0–0.2)
BASOPHILS NFR BLD AUTO: 0.8 % — SIGNIFICANT CHANGE UP (ref 0–2)
EOSINOPHIL # BLD AUTO: 0.1 K/UL — SIGNIFICANT CHANGE UP (ref 0–0.5)
EOSINOPHIL NFR BLD AUTO: 1.6 % — SIGNIFICANT CHANGE UP (ref 0–6)
HCT VFR BLD CALC: 28.7 % — LOW (ref 34.5–45)
HGB BLD-MCNC: 9.5 G/DL — LOW (ref 11.5–15.5)
LYMPHOCYTES # BLD AUTO: 2.6 K/UL — SIGNIFICANT CHANGE UP (ref 1–3.3)
LYMPHOCYTES # BLD AUTO: 37 % — SIGNIFICANT CHANGE UP (ref 13–44)
MCHC RBC-ENTMCNC: 29.2 PG — SIGNIFICANT CHANGE UP (ref 27–34)
MCHC RBC-ENTMCNC: 33.1 G/DL — SIGNIFICANT CHANGE UP (ref 32–36)
MCV RBC AUTO: 88.4 FL — SIGNIFICANT CHANGE UP (ref 80–100)
MONOCYTES # BLD AUTO: 0.4 K/UL — SIGNIFICANT CHANGE UP (ref 0–0.9)
MONOCYTES NFR BLD AUTO: 6.3 % — SIGNIFICANT CHANGE UP (ref 2–14)
NEUTROPHILS # BLD AUTO: 3.8 K/UL — SIGNIFICANT CHANGE UP (ref 1.8–7.4)
NEUTROPHILS NFR BLD AUTO: 54.4 % — SIGNIFICANT CHANGE UP (ref 43–77)
PLATELET # BLD AUTO: 224 K/UL — SIGNIFICANT CHANGE UP (ref 150–400)
RBC # BLD: 3.25 M/UL — LOW (ref 3.8–5.2)
RBC # FLD: 21.9 % — HIGH (ref 10.3–14.5)
WBC # BLD: 7 K/UL — SIGNIFICANT CHANGE UP (ref 3.8–10.5)
WBC # FLD AUTO: 7 K/UL — SIGNIFICANT CHANGE UP (ref 3.8–10.5)

## 2019-06-21 DIAGNOSIS — Z51.11 ENCOUNTER FOR ANTINEOPLASTIC CHEMOTHERAPY: ICD-10-CM

## 2019-06-21 DIAGNOSIS — R11.2 NAUSEA WITH VOMITING, UNSPECIFIED: ICD-10-CM

## 2019-06-24 ENCOUNTER — OUTPATIENT (OUTPATIENT)
Dept: OUTPATIENT SERVICES | Facility: HOSPITAL | Age: 69
LOS: 1 days | Discharge: HOME | End: 2019-06-24

## 2019-06-24 DIAGNOSIS — C57.4 MALIGNANT NEOPLASM OF UTERINE ADNEXA, UNSPECIFIED: ICD-10-CM

## 2019-06-24 DIAGNOSIS — Z98.890 OTHER SPECIFIED POSTPROCEDURAL STATES: Chronic | ICD-10-CM

## 2019-06-28 ENCOUNTER — APPOINTMENT (OUTPATIENT)
Dept: HEMATOLOGY ONCOLOGY | Facility: CLINIC | Age: 69
End: 2019-06-28
Payer: COMMERCIAL

## 2019-07-02 ENCOUNTER — RESULT REVIEW (OUTPATIENT)
Age: 69
End: 2019-07-02

## 2019-07-02 ENCOUNTER — APPOINTMENT (OUTPATIENT)
Dept: HEMATOLOGY ONCOLOGY | Facility: CLINIC | Age: 69
End: 2019-07-02
Payer: COMMERCIAL

## 2019-07-02 VITALS
SYSTOLIC BLOOD PRESSURE: 174 MMHG | RESPIRATION RATE: 17 BRPM | TEMPERATURE: 99.1 F | OXYGEN SATURATION: 99 % | HEART RATE: 106 BPM | BODY MASS INDEX: 33.87 KG/M2 | WEIGHT: 189.99 LBS | DIASTOLIC BLOOD PRESSURE: 115 MMHG

## 2019-07-02 LAB
BASOPHILS # BLD AUTO: 0 K/UL — SIGNIFICANT CHANGE UP (ref 0–0.2)
BASOPHILS NFR BLD AUTO: 0.3 % — SIGNIFICANT CHANGE UP (ref 0–2)
BLD GP AB SCN SERPL QL: NEGATIVE — SIGNIFICANT CHANGE UP
EOSINOPHIL # BLD AUTO: 0.1 K/UL — SIGNIFICANT CHANGE UP (ref 0–0.5)
EOSINOPHIL NFR BLD AUTO: 1.3 % — SIGNIFICANT CHANGE UP (ref 0–6)
HCT VFR BLD CALC: 29.4 % — LOW (ref 34.5–45)
HGB BLD-MCNC: 9.6 G/DL — LOW (ref 11.5–15.5)
LYMPHOCYTES # BLD AUTO: 2.4 K/UL — SIGNIFICANT CHANGE UP (ref 1–3.3)
LYMPHOCYTES # BLD AUTO: 61 % — HIGH (ref 13–44)
MCHC RBC-ENTMCNC: 30.5 PG — SIGNIFICANT CHANGE UP (ref 27–34)
MCHC RBC-ENTMCNC: 32.8 G/DL — SIGNIFICANT CHANGE UP (ref 32–36)
MCV RBC AUTO: 93 FL — SIGNIFICANT CHANGE UP (ref 80–100)
MONOCYTES # BLD AUTO: 0.4 K/UL — SIGNIFICANT CHANGE UP (ref 0–0.9)
MONOCYTES NFR BLD AUTO: 10 % — SIGNIFICANT CHANGE UP (ref 2–14)
NEUTROPHILS # BLD AUTO: 1.1 K/UL — LOW (ref 1.8–7.4)
NEUTROPHILS NFR BLD AUTO: 27.4 % — LOW (ref 43–77)
PLATELET # BLD AUTO: 248 K/UL — SIGNIFICANT CHANGE UP (ref 150–400)
RBC # BLD: 3.16 M/UL — LOW (ref 3.8–5.2)
RBC # FLD: 21.4 % — HIGH (ref 10.3–14.5)
RH IG SCN BLD-IMP: POSITIVE — SIGNIFICANT CHANGE UP
WBC # BLD: 3.9 K/UL — SIGNIFICANT CHANGE UP (ref 3.8–10.5)
WBC # FLD AUTO: 3.9 K/UL — SIGNIFICANT CHANGE UP (ref 3.8–10.5)

## 2019-07-02 PROCEDURE — 86901 BLOOD TYPING SEROLOGIC RH(D): CPT

## 2019-07-02 PROCEDURE — 86850 RBC ANTIBODY SCREEN: CPT

## 2019-07-02 PROCEDURE — 86923 COMPATIBILITY TEST ELECTRIC: CPT

## 2019-07-02 PROCEDURE — 86900 BLOOD TYPING SEROLOGIC ABO: CPT

## 2019-07-02 PROCEDURE — 99214 OFFICE O/P EST MOD 30 MIN: CPT

## 2019-07-03 LAB
ALBUMIN SERPL ELPH-MCNC: 4.3 G/DL
ALP BLD-CCNC: 91 U/L
ALT SERPL-CCNC: 9 U/L
ANION GAP SERPL CALC-SCNC: 15 MMOL/L
AST SERPL-CCNC: 16 U/L
BILIRUB SERPL-MCNC: 0.2 MG/DL
BUN SERPL-MCNC: 21 MG/DL
CALCIUM SERPL-MCNC: 9.8 MG/DL
CANCER AG125 SERPL-ACNC: 40 U/ML
CEA SERPL-MCNC: 2.2 NG/ML
CHLORIDE SERPL-SCNC: 111 MMOL/L
CO2 SERPL-SCNC: 21 MMOL/L
CREAT SERPL-MCNC: 1.5 MG/DL
GLUCOSE SERPL-MCNC: 81 MG/DL
MAGNESIUM SERPL-MCNC: 1.6 MG/DL
POTASSIUM SERPL-SCNC: 4 MMOL/L
PROT SERPL-MCNC: 7.3 G/DL
SODIUM SERPL-SCNC: 147 MMOL/L

## 2019-07-05 NOTE — REVIEW OF SYSTEMS
[Vomiting] : vomiting [Fatigue] : fatigue [Constipation] : constipation [Negative] : Allergic/Immunologic [FreeTextEntry7] : nausea [de-identified] : numbness fingers and feet b/l

## 2019-07-05 NOTE — HISTORY OF PRESENT ILLNESS
[Treatment Protocol] : Treatment Protocol [Constitutional] : Constitutional [Cardiovascular] : Cardiovascular [Endocrine] : Endocrine [Dermatologic] : Dermatologic [ENT] : ENT [Gastrointestinal] : Gastrointestinal [Genitourinary] : Genitourinary [Gynecologic] : Gynecologic [Musculoskeletal] : Musculoskeletal [Infectious] : Infectious [Neurologic] : Neurologic [Pain] : Pain [Pulmonary] : Pulmonary [Hematologic] : Hematologic [FreeTextEntry1] : Carboplatin and Taxol\par C1 - 4/5/19\par C2 - 5/14/19\par C3 - 6/20/19 [de-identified] : Patient is here for follow up, continues to tolerate treatment well overall.  Appetite is good, maintaining her weight.  She complains of numbness to her fingers and feet, was previously intermittent but is now constant, denies difficulty walking using hands.  Her vaginal bleeding continues to decrease, now is intermittent spotting.  Denies fever, chills, chest pain, SOB, abdominal pain, bloating, nausea, vomiting, diarrhea, constipation, swelling. [de-identified] : 68 y.o female with newly diagnosed metastatic uterine MMT , here with her son to discuss treatment options.\par \par Patient initially presented to the ER with a syncopal episode. As per patient she started with vaginal bleed six months ago. Bleeding was scan and she did not seek medical help until 2/24/19. On that day she saw Dr. Hatch who told patient that she might have fibroids.\par Af ter the doctors visit , she went to a restaurant to eat, and felt dizzy and passed out.\par She was taken to [facility], where she had scans of C/A/P.\par \par Ct angio of A/P 2/24/19: No abdominal aortic aneurysm or dissection. \par \par Heterogeneous endometrium distended to approximately 6.7 cm. The findings \par could represent cervical carcinoma with complex fluid in the endometrial \par canal versus endometrial carcinoma. Extensive pelvic and retroperitoneal \par lymphadenopathy consistent with metastatic disease. \par \par Focal thickening of the gallbladder wall at the fundus may reflect \par adenomyomatosis. Gallbladder ultrasound is recommended to exclude \par suspicious lesion. \par \par CT chest 2/26/19: Left supraclavicular lymphadenopathy worrisome for metastatic \par disease is amenable to ultrasound-guided biopsy. \par \par 2 nonspecific tiny left lower lobe nodules. \par \par Patient was seen by Gyn Onc surgery and had a biopsy of the prolapsing cervical mass. 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

## 2019-07-05 NOTE — ASSESSMENT
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  No need for transfusion at this time.\par \par \par \par \par

## 2019-07-11 ENCOUNTER — RESULT REVIEW (OUTPATIENT)
Age: 69
End: 2019-07-11

## 2019-07-11 ENCOUNTER — APPOINTMENT (OUTPATIENT)
Dept: INFUSION THERAPY | Facility: HOSPITAL | Age: 69
End: 2019-07-11

## 2019-07-11 LAB
BASOPHILS # BLD AUTO: 0 K/UL — SIGNIFICANT CHANGE UP (ref 0–0.2)
BASOPHILS NFR BLD AUTO: 0.3 % — SIGNIFICANT CHANGE UP (ref 0–2)
EOSINOPHIL # BLD AUTO: 0.1 K/UL — SIGNIFICANT CHANGE UP (ref 0–0.5)
EOSINOPHIL NFR BLD AUTO: 1.8 % — SIGNIFICANT CHANGE UP (ref 0–6)
HCT VFR BLD CALC: 28.9 % — LOW (ref 34.5–45)
HGB BLD-MCNC: 9.7 G/DL — LOW (ref 11.5–15.5)
LYMPHOCYTES # BLD AUTO: 2.5 K/UL — SIGNIFICANT CHANGE UP (ref 1–3.3)
LYMPHOCYTES # BLD AUTO: 30 % — SIGNIFICANT CHANGE UP (ref 13–44)
MCHC RBC-ENTMCNC: 31.3 PG — SIGNIFICANT CHANGE UP (ref 27–34)
MCHC RBC-ENTMCNC: 33.5 G/DL — SIGNIFICANT CHANGE UP (ref 32–36)
MCV RBC AUTO: 93.4 FL — SIGNIFICANT CHANGE UP (ref 80–100)
MONOCYTES # BLD AUTO: 0.6 K/UL — SIGNIFICANT CHANGE UP (ref 0–0.9)
MONOCYTES NFR BLD AUTO: 6.8 % — SIGNIFICANT CHANGE UP (ref 2–14)
NEUTROPHILS # BLD AUTO: 5.1 K/UL — SIGNIFICANT CHANGE UP (ref 1.8–7.4)
NEUTROPHILS NFR BLD AUTO: 61.1 % — SIGNIFICANT CHANGE UP (ref 43–77)
PLATELET # BLD AUTO: 228 K/UL — SIGNIFICANT CHANGE UP (ref 150–400)
RBC # BLD: 3.1 M/UL — LOW (ref 3.8–5.2)
RBC # FLD: 19.9 % — HIGH (ref 10.3–14.5)
WBC # BLD: 8.4 K/UL — SIGNIFICANT CHANGE UP (ref 3.8–10.5)
WBC # FLD AUTO: 8.4 K/UL — SIGNIFICANT CHANGE UP (ref 3.8–10.5)

## 2019-07-11 RX ORDER — LIDOCAINE AND PRILOCAINE 2.5 %-2.5%
2.5-2.5 KIT TOPICAL
Qty: 1 | Refills: 0 | Status: ACTIVE | COMMUNITY
Start: 2019-07-11 | End: 1900-01-01

## 2019-07-12 DIAGNOSIS — C54.1 MALIGNANT NEOPLASM OF ENDOMETRIUM: ICD-10-CM

## 2019-07-12 DIAGNOSIS — Z51.11 ENCOUNTER FOR ANTINEOPLASTIC CHEMOTHERAPY: ICD-10-CM

## 2019-07-12 DIAGNOSIS — R11.2 NAUSEA WITH VOMITING, UNSPECIFIED: ICD-10-CM

## 2019-07-16 ENCOUNTER — APPOINTMENT (OUTPATIENT)
Dept: CT IMAGING | Facility: IMAGING CENTER | Age: 69
End: 2019-07-16

## 2019-07-22 ENCOUNTER — OUTPATIENT (OUTPATIENT)
Dept: OUTPATIENT SERVICES | Facility: HOSPITAL | Age: 69
LOS: 1 days | Discharge: HOME | End: 2019-07-22

## 2019-07-22 DIAGNOSIS — C57.4 MALIGNANT NEOPLASM OF UTERINE ADNEXA, UNSPECIFIED: ICD-10-CM

## 2019-07-22 DIAGNOSIS — Z98.890 OTHER SPECIFIED POSTPROCEDURAL STATES: Chronic | ICD-10-CM

## 2019-07-23 ENCOUNTER — FORM ENCOUNTER (OUTPATIENT)
Age: 69
End: 2019-07-23

## 2019-07-24 ENCOUNTER — APPOINTMENT (OUTPATIENT)
Dept: CT IMAGING | Facility: IMAGING CENTER | Age: 69
End: 2019-07-24
Payer: COMMERCIAL

## 2019-07-24 ENCOUNTER — OUTPATIENT (OUTPATIENT)
Dept: OUTPATIENT SERVICES | Facility: HOSPITAL | Age: 69
LOS: 1 days | End: 2019-07-24
Payer: COMMERCIAL

## 2019-07-24 DIAGNOSIS — Z98.890 OTHER SPECIFIED POSTPROCEDURAL STATES: Chronic | ICD-10-CM

## 2019-07-24 DIAGNOSIS — C55 MALIGNANT NEOPLASM OF UTERUS, PART UNSPECIFIED: ICD-10-CM

## 2019-07-24 PROCEDURE — 74177 CT ABD & PELVIS W/CONTRAST: CPT

## 2019-07-24 PROCEDURE — 71260 CT THORAX DX C+: CPT

## 2019-07-24 PROCEDURE — 71260 CT THORAX DX C+: CPT | Mod: 26

## 2019-07-24 PROCEDURE — 74177 CT ABD & PELVIS W/CONTRAST: CPT | Mod: 26

## 2019-07-26 ENCOUNTER — RESULT REVIEW (OUTPATIENT)
Age: 69
End: 2019-07-26

## 2019-07-26 ENCOUNTER — APPOINTMENT (OUTPATIENT)
Dept: HEMATOLOGY ONCOLOGY | Facility: CLINIC | Age: 69
End: 2019-07-26
Payer: COMMERCIAL

## 2019-07-26 ENCOUNTER — APPOINTMENT (OUTPATIENT)
Dept: INFUSION THERAPY | Facility: HOSPITAL | Age: 69
End: 2019-07-26

## 2019-07-26 VITALS
HEART RATE: 106 BPM | WEIGHT: 189.6 LBS | RESPIRATION RATE: 16 BRPM | SYSTOLIC BLOOD PRESSURE: 160 MMHG | BODY MASS INDEX: 33.8 KG/M2 | OXYGEN SATURATION: 99 % | TEMPERATURE: 98 F | DIASTOLIC BLOOD PRESSURE: 82 MMHG

## 2019-07-26 DIAGNOSIS — G62.0 DRUG-INDUCED POLYNEUROPATHY: ICD-10-CM

## 2019-07-26 DIAGNOSIS — T45.1X5A DRUG-INDUCED POLYNEUROPATHY: ICD-10-CM

## 2019-07-26 LAB
ANISOCYTOSIS BLD QL: SLIGHT — SIGNIFICANT CHANGE UP
BASOPHILS NFR BLD AUTO: 1 % — SIGNIFICANT CHANGE UP (ref 0–2)
HCT VFR BLD CALC: 26.3 % — LOW (ref 34.5–45)
HGB BLD-MCNC: 8.9 G/DL — LOW (ref 11.5–15.5)
HYPOCHROMIA BLD QL: SLIGHT — SIGNIFICANT CHANGE UP
LYMPHOCYTES # BLD AUTO: 1.9 K/UL — SIGNIFICANT CHANGE UP (ref 1–3.3)
LYMPHOCYTES # BLD AUTO: 57 % — HIGH (ref 13–44)
MCHC RBC-ENTMCNC: 32.7 PG — SIGNIFICANT CHANGE UP (ref 27–34)
MCHC RBC-ENTMCNC: 33.7 G/DL — SIGNIFICANT CHANGE UP (ref 32–36)
MCV RBC AUTO: 96.9 FL — SIGNIFICANT CHANGE UP (ref 80–100)
MICROCYTES BLD QL: SLIGHT — SIGNIFICANT CHANGE UP
MONOCYTES # BLD AUTO: 0.4 K/UL — SIGNIFICANT CHANGE UP (ref 0–0.9)
MONOCYTES NFR BLD AUTO: 9 % — SIGNIFICANT CHANGE UP (ref 2–14)
NEUTROPHILS # BLD AUTO: 1 K/UL — LOW (ref 1.8–7.4)
NEUTROPHILS NFR BLD AUTO: 33 % — LOW (ref 43–77)
PLAT MORPH BLD: NORMAL — SIGNIFICANT CHANGE UP
PLATELET # BLD AUTO: 189 K/UL — SIGNIFICANT CHANGE UP (ref 150–400)
RBC # BLD: 2.72 M/UL — LOW (ref 3.8–5.2)
RBC # FLD: 19 % — HIGH (ref 10.3–14.5)
RBC BLD AUTO: ABNORMAL
WBC # BLD: 3.5 K/UL — LOW (ref 3.8–10.5)
WBC # FLD AUTO: 3.5 K/UL — LOW (ref 3.8–10.5)

## 2019-07-26 PROCEDURE — 99215 OFFICE O/P EST HI 40 MIN: CPT

## 2019-07-26 NOTE — HISTORY OF PRESENT ILLNESS
[Disease: _____________________] : Disease: [unfilled] [AJCC Stage: ____] : AJCC Stage: [unfilled] [de-identified] : 68 y.o female with newly diagnosed metastatic uterine MMT , here with her son to discuss treatment options.\par \par Patient initially presented to the ER with a syncopal episode. As per patient she started with vaginal bleed six months ago. Bleeding was scan and she did not seek medical help until 2/24/19. On that day she saw Dr. Hatch who told patient that she might have fibroids.\par Af ter the doctors visit , she went to a restaurant to eat, and felt dizzy and passed out.\par She was taken to [facility], where she had scans of C/A/P.\par \par Ct angio of A/P 2/24/19: No abdominal aortic aneurysm or dissection. \par \par Heterogeneous endometrium distended to approximately 6.7 cm. The findings \par could represent cervical carcinoma with complex fluid in the endometrial \par canal versus endometrial carcinoma. Extensive pelvic and retroperitoneal \par lymphadenopathy consistent with metastatic disease. \par \par Focal thickening of the gallbladder wall at the fundus may reflect \par adenomyomatosis. Gallbladder ultrasound is recommended to exclude \par suspicious lesion. \par \par CT chest 2/26/19: Left supraclavicular lymphadenopathy worrisome for metastatic \par disease is amenable to ultrasound-guided biopsy. \par \par 2 nonspecific tiny left lower lobe nodules. \par \par Patient was seen by Gyn Onc surgery and had a biopsy of the prolapsing cervical mass. 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 [Treatment Protocol] : Treatment Protocol [FreeTextEntry1] : Carboplatin and Taxol\par C1 - 4/5/19\par C2 - 5/14/19\par C3 - 6/20/19. \par C4- 6/20/19\par C5- 7/11/19\par  [de-identified] : C.o neuropathy of hands and feet with increasing pins and needle sensation.\par She otherwise is feeling well.\par She had a scan after third cycle that showed response.

## 2019-07-30 ENCOUNTER — RX RENEWAL (OUTPATIENT)
Age: 69
End: 2019-07-30

## 2019-08-01 ENCOUNTER — APPOINTMENT (OUTPATIENT)
Dept: INFUSION THERAPY | Facility: HOSPITAL | Age: 69
End: 2019-08-01

## 2019-08-01 ENCOUNTER — RESULT REVIEW (OUTPATIENT)
Age: 69
End: 2019-08-01

## 2019-08-01 LAB
BASOPHILS # BLD AUTO: 0 K/UL — SIGNIFICANT CHANGE UP (ref 0–0.2)
BASOPHILS NFR BLD AUTO: 0.6 % — SIGNIFICANT CHANGE UP (ref 0–2)
EOSINOPHIL # BLD AUTO: 0 K/UL — SIGNIFICANT CHANGE UP (ref 0–0.5)
EOSINOPHIL NFR BLD AUTO: 0 % — SIGNIFICANT CHANGE UP (ref 0–6)
HCT VFR BLD CALC: 26.1 % — LOW (ref 34.5–45)
HGB BLD-MCNC: 8.8 G/DL — LOW (ref 11.5–15.5)
LYMPHOCYTES # BLD AUTO: 1.9 K/UL — SIGNIFICANT CHANGE UP (ref 1–3.3)
LYMPHOCYTES # BLD AUTO: 31.6 % — SIGNIFICANT CHANGE UP (ref 13–44)
MCHC RBC-ENTMCNC: 32.8 PG — SIGNIFICANT CHANGE UP (ref 27–34)
MCHC RBC-ENTMCNC: 33.8 G/DL — SIGNIFICANT CHANGE UP (ref 32–36)
MCV RBC AUTO: 97 FL — SIGNIFICANT CHANGE UP (ref 80–100)
MONOCYTES # BLD AUTO: 0.3 K/UL — SIGNIFICANT CHANGE UP (ref 0–0.9)
MONOCYTES NFR BLD AUTO: 5.7 % — SIGNIFICANT CHANGE UP (ref 2–14)
NEUTROPHILS # BLD AUTO: 3.7 K/UL — SIGNIFICANT CHANGE UP (ref 1.8–7.4)
NEUTROPHILS NFR BLD AUTO: 62.2 % — SIGNIFICANT CHANGE UP (ref 43–77)
PLATELET # BLD AUTO: 212 K/UL — SIGNIFICANT CHANGE UP (ref 150–400)
RBC # BLD: 2.7 M/UL — LOW (ref 3.8–5.2)
RBC # FLD: 19.2 % — HIGH (ref 10.3–14.5)
WBC # BLD: 6 K/UL — SIGNIFICANT CHANGE UP (ref 3.8–10.5)
WBC # FLD AUTO: 6 K/UL — SIGNIFICANT CHANGE UP (ref 3.8–10.5)

## 2019-08-02 DIAGNOSIS — C54.1 MALIGNANT NEOPLASM OF ENDOMETRIUM: ICD-10-CM

## 2019-08-02 DIAGNOSIS — R11.2 NAUSEA WITH VOMITING, UNSPECIFIED: ICD-10-CM

## 2019-08-02 DIAGNOSIS — Z51.11 ENCOUNTER FOR ANTINEOPLASTIC CHEMOTHERAPY: ICD-10-CM

## 2019-08-09 ENCOUNTER — APPOINTMENT (OUTPATIENT)
Dept: HEMATOLOGY ONCOLOGY | Facility: CLINIC | Age: 69
End: 2019-08-09

## 2019-08-09 ENCOUNTER — APPOINTMENT (OUTPATIENT)
Dept: INFUSION THERAPY | Facility: HOSPITAL | Age: 69
End: 2019-08-09

## 2019-08-15 ENCOUNTER — LABORATORY RESULT (OUTPATIENT)
Age: 69
End: 2019-08-15

## 2019-08-15 ENCOUNTER — RESULT REVIEW (OUTPATIENT)
Age: 69
End: 2019-08-15

## 2019-08-15 ENCOUNTER — APPOINTMENT (OUTPATIENT)
Dept: INFUSION THERAPY | Facility: HOSPITAL | Age: 69
End: 2019-08-15

## 2019-08-15 ENCOUNTER — APPOINTMENT (OUTPATIENT)
Dept: HEMATOLOGY ONCOLOGY | Facility: CLINIC | Age: 69
End: 2019-08-15
Payer: COMMERCIAL

## 2019-08-15 VITALS
OXYGEN SATURATION: 100 % | RESPIRATION RATE: 16 BRPM | HEART RATE: 112 BPM | TEMPERATURE: 97.5 F | DIASTOLIC BLOOD PRESSURE: 81 MMHG | WEIGHT: 189.38 LBS | SYSTOLIC BLOOD PRESSURE: 134 MMHG | BODY MASS INDEX: 33.76 KG/M2

## 2019-08-15 LAB
BASOPHILS # BLD AUTO: 0 K/UL — SIGNIFICANT CHANGE UP (ref 0–0.2)
BASOPHILS NFR BLD AUTO: 0.5 % — SIGNIFICANT CHANGE UP (ref 0–2)
EOSINOPHIL # BLD AUTO: 0 K/UL — SIGNIFICANT CHANGE UP (ref 0–0.5)
EOSINOPHIL NFR BLD AUTO: 0.4 % — SIGNIFICANT CHANGE UP (ref 0–6)
HCT VFR BLD CALC: 26 % — LOW (ref 34.5–45)
HGB BLD-MCNC: 8.7 G/DL — LOW (ref 11.5–15.5)
LYMPHOCYTES # BLD AUTO: 2.2 K/UL — SIGNIFICANT CHANGE UP (ref 1–3.3)
LYMPHOCYTES # BLD AUTO: 58.8 % — HIGH (ref 13–44)
MCHC RBC-ENTMCNC: 33 PG — SIGNIFICANT CHANGE UP (ref 27–34)
MCHC RBC-ENTMCNC: 33.6 G/DL — SIGNIFICANT CHANGE UP (ref 32–36)
MCV RBC AUTO: 98 FL — SIGNIFICANT CHANGE UP (ref 80–100)
MONOCYTES # BLD AUTO: 0.5 K/UL — SIGNIFICANT CHANGE UP (ref 0–0.9)
MONOCYTES NFR BLD AUTO: 12.8 % — SIGNIFICANT CHANGE UP (ref 2–14)
NEUTROPHILS # BLD AUTO: 1 K/UL — LOW (ref 1.8–7.4)
NEUTROPHILS NFR BLD AUTO: 27.5 % — LOW (ref 43–77)
PLATELET # BLD AUTO: 192 K/UL — SIGNIFICANT CHANGE UP (ref 150–400)
RBC # BLD: 2.65 M/UL — LOW (ref 3.8–5.2)
RBC # FLD: 19 % — HIGH (ref 10.3–14.5)
WBC # BLD: 3.7 K/UL — LOW (ref 3.8–10.5)
WBC # FLD AUTO: 3.7 K/UL — LOW (ref 3.8–10.5)

## 2019-08-15 PROCEDURE — 99214 OFFICE O/P EST MOD 30 MIN: CPT

## 2019-08-20 ENCOUNTER — OUTPATIENT (OUTPATIENT)
Dept: OUTPATIENT SERVICES | Facility: HOSPITAL | Age: 69
LOS: 1 days | Discharge: HOME | End: 2019-08-20

## 2019-08-20 DIAGNOSIS — Z98.890 OTHER SPECIFIED POSTPROCEDURAL STATES: Chronic | ICD-10-CM

## 2019-08-20 DIAGNOSIS — C54.1 MALIGNANT NEOPLASM OF ENDOMETRIUM: ICD-10-CM

## 2019-08-20 NOTE — HISTORY OF PRESENT ILLNESS
[Disease: _____________________] : Disease: [unfilled] [AJCC Stage: ____] : AJCC Stage: [unfilled] [Treatment Protocol] : Treatment Protocol [FreeTextEntry1] : Carboplatin and Taxol\par C1 - 4/5/19\par C2 - 5/14/19\par C3 - 6/20/19. \par C4- 7/11/19\par C5 - 8/5/19 [de-identified] : 68 y.o female with newly diagnosed metastatic uterine MMT , here with her son to discuss treatment options.\par \par Patient initially presented to the ER with a syncopal episode. As per patient she started with vaginal bleed six months ago. Bleeding was scan and she did not seek medical help until 2/24/19. On that day she saw Dr. Hatch who told patient that she might have fibroids.\par Af ter the doctors visit , she went to a restaurant to eat, and felt dizzy and passed out.\par She was taken to [facility], where she had scans of C/A/P.\par \par Ct angio of A/P 2/24/19: No abdominal aortic aneurysm or dissection. \par \par Heterogeneous endometrium distended to approximately 6.7 cm. The findings \par could represent cervical carcinoma with complex fluid in the endometrial \par canal versus endometrial carcinoma. Extensive pelvic and retroperitoneal \par lymphadenopathy consistent with metastatic disease. \par \par Focal thickening of the gallbladder wall at the fundus may reflect \par adenomyomatosis. Gallbladder ultrasound is recommended to exclude \par suspicious lesion. \par \par CT chest 2/26/19: Left supraclavicular lymphadenopathy worrisome for metastatic \par disease is amenable to ultrasound-guided biopsy. \par \par 2 nonspecific tiny left lower lobe nodules. \par \par Patient was seen by Gyn Onc surgery and had a biopsy of the prolapsing cervical mass. 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 [de-identified] : Patient is here for follow up, complains of increased neuropathy described as tingling of fingers and bottom of feet.  Denies dropping objects or any difficulty walking, falls, etc.  She is taking 100mg gabapentin qhs.  Appetite is stable, maintaining her weight.  Vaginal bleeding is much less, only intermittent spotting now.  Denies fever, chills, chest pain, SOB, abdominal pain, nausea, vomiting, diarrhea, constipation.

## 2019-08-20 NOTE — ASSESSMENT
[Palliative] : Goals of care discussed with patient: Palliative [Palliative Care Plan] : not applicable at this time [FreeTextEntry1] : 69 year old woman with gyn cancer on chemotherapy with Carboplatin and Taxol.\par She has completed 5 cycles.\par Peripheral neuropath is increased - will increase gabapentin to 200mg at bedtime.  Will discuss further management with Dr. Garcia.  May delay next cycle is symptoms worsen.\par Will get scans after another 2 cycles to evaluate treatment response.

## 2019-08-22 ENCOUNTER — APPOINTMENT (OUTPATIENT)
Dept: INFUSION THERAPY | Facility: HOSPITAL | Age: 69
End: 2019-08-22

## 2019-08-29 ENCOUNTER — APPOINTMENT (OUTPATIENT)
Dept: INFUSION THERAPY | Facility: HOSPITAL | Age: 69
End: 2019-08-29

## 2019-08-29 ENCOUNTER — RESULT REVIEW (OUTPATIENT)
Age: 69
End: 2019-08-29

## 2019-08-29 LAB
BASOPHILS # BLD AUTO: 0 K/UL — SIGNIFICANT CHANGE UP (ref 0–0.2)
BASOPHILS NFR BLD AUTO: 0.2 % — SIGNIFICANT CHANGE UP (ref 0–2)
EOSINOPHIL # BLD AUTO: 0.1 K/UL — SIGNIFICANT CHANGE UP (ref 0–0.5)
EOSINOPHIL NFR BLD AUTO: 2 % — SIGNIFICANT CHANGE UP (ref 0–6)
HCT VFR BLD CALC: 25.3 % — LOW (ref 34.5–45)
HGB BLD-MCNC: 8.4 G/DL — LOW (ref 11.5–15.5)
LYMPHOCYTES # BLD AUTO: 1.8 K/UL — SIGNIFICANT CHANGE UP (ref 1–3.3)
LYMPHOCYTES # BLD AUTO: 39.9 % — SIGNIFICANT CHANGE UP (ref 13–44)
MCHC RBC-ENTMCNC: 32.7 PG — SIGNIFICANT CHANGE UP (ref 27–34)
MCHC RBC-ENTMCNC: 33.3 G/DL — SIGNIFICANT CHANGE UP (ref 32–36)
MCV RBC AUTO: 98.2 FL — SIGNIFICANT CHANGE UP (ref 80–100)
MONOCYTES # BLD AUTO: 0.2 K/UL — SIGNIFICANT CHANGE UP (ref 0–0.9)
MONOCYTES NFR BLD AUTO: 5.5 % — SIGNIFICANT CHANGE UP (ref 2–14)
NEUTROPHILS # BLD AUTO: 2.4 K/UL — SIGNIFICANT CHANGE UP (ref 1.8–7.4)
NEUTROPHILS NFR BLD AUTO: 52.5 % — SIGNIFICANT CHANGE UP (ref 43–77)
PLATELET # BLD AUTO: 145 K/UL — LOW (ref 150–400)
RBC # BLD: 2.58 M/UL — LOW (ref 3.8–5.2)
RBC # FLD: 18.8 % — HIGH (ref 10.3–14.5)
WBC # BLD: 4.6 K/UL — SIGNIFICANT CHANGE UP (ref 3.8–10.5)
WBC # FLD AUTO: 4.6 K/UL — SIGNIFICANT CHANGE UP (ref 3.8–10.5)

## 2019-08-30 DIAGNOSIS — R11.2 NAUSEA WITH VOMITING, UNSPECIFIED: ICD-10-CM

## 2019-08-30 DIAGNOSIS — Z51.11 ENCOUNTER FOR ANTINEOPLASTIC CHEMOTHERAPY: ICD-10-CM

## 2019-09-06 ENCOUNTER — APPOINTMENT (OUTPATIENT)
Dept: INFUSION THERAPY | Facility: HOSPITAL | Age: 69
End: 2019-09-06

## 2019-09-06 ENCOUNTER — LABORATORY RESULT (OUTPATIENT)
Age: 69
End: 2019-09-06

## 2019-09-06 ENCOUNTER — APPOINTMENT (OUTPATIENT)
Dept: HEMATOLOGY ONCOLOGY | Facility: CLINIC | Age: 69
End: 2019-09-06

## 2019-09-19 ENCOUNTER — LABORATORY RESULT (OUTPATIENT)
Age: 69
End: 2019-09-19

## 2019-09-20 ENCOUNTER — APPOINTMENT (OUTPATIENT)
Dept: HEMATOLOGY ONCOLOGY | Facility: CLINIC | Age: 69
End: 2019-09-20

## 2019-09-24 ENCOUNTER — OUTPATIENT (OUTPATIENT)
Dept: OUTPATIENT SERVICES | Facility: HOSPITAL | Age: 69
LOS: 1 days | Discharge: HOME | End: 2019-09-24

## 2019-09-24 DIAGNOSIS — C54.1 MALIGNANT NEOPLASM OF ENDOMETRIUM: ICD-10-CM

## 2019-09-24 DIAGNOSIS — Z98.890 OTHER SPECIFIED POSTPROCEDURAL STATES: Chronic | ICD-10-CM

## 2019-09-26 ENCOUNTER — APPOINTMENT (OUTPATIENT)
Dept: INFUSION THERAPY | Facility: HOSPITAL | Age: 69
End: 2019-09-26

## 2019-09-26 ENCOUNTER — LABORATORY RESULT (OUTPATIENT)
Age: 69
End: 2019-09-26

## 2019-09-30 ENCOUNTER — LABORATORY RESULT (OUTPATIENT)
Age: 69
End: 2019-09-30

## 2019-09-30 ENCOUNTER — RX RENEWAL (OUTPATIENT)
Age: 69
End: 2019-09-30

## 2019-09-30 ENCOUNTER — APPOINTMENT (OUTPATIENT)
Dept: INFUSION THERAPY | Facility: HOSPITAL | Age: 69
End: 2019-09-30

## 2019-09-30 ENCOUNTER — RESULT REVIEW (OUTPATIENT)
Age: 69
End: 2019-09-30

## 2019-09-30 ENCOUNTER — APPOINTMENT (OUTPATIENT)
Dept: HEMATOLOGY ONCOLOGY | Facility: CLINIC | Age: 69
End: 2019-09-30
Payer: COMMERCIAL

## 2019-09-30 LAB
BASOPHILS # BLD AUTO: 0 K/UL — SIGNIFICANT CHANGE UP (ref 0–0.2)
BASOPHILS NFR BLD AUTO: 0.4 % — SIGNIFICANT CHANGE UP (ref 0–2)
BUN SERPL-MCNC: 39 MG/DL — HIGH (ref 7–23)
CA-I BLDA-SCNC: 1.29 MMOL/L — SIGNIFICANT CHANGE UP (ref 1.12–1.3)
CHLORIDE SERPL-SCNC: 112 MMOL/L — HIGH (ref 96–108)
CO2 SERPL-SCNC: 21 MMOL/L — LOW (ref 22–31)
CREAT SERPL-MCNC: 1.6 MG/DL — HIGH (ref 0.5–1.3)
EOSINOPHIL # BLD AUTO: 0.1 K/UL — SIGNIFICANT CHANGE UP (ref 0–0.5)
EOSINOPHIL NFR BLD AUTO: 2.2 % — SIGNIFICANT CHANGE UP (ref 0–6)
GLUCOSE SERPL-MCNC: 86 MG/DL — SIGNIFICANT CHANGE UP (ref 70–99)
HCT VFR BLD CALC: 25.9 % — LOW (ref 34.5–45)
HGB BLD-MCNC: 8.6 G/DL — LOW (ref 11.5–15.5)
LYMPHOCYTES # BLD AUTO: 2.2 K/UL — SIGNIFICANT CHANGE UP (ref 1–3.3)
LYMPHOCYTES # BLD AUTO: 40.2 % — SIGNIFICANT CHANGE UP (ref 13–44)
MCHC RBC-ENTMCNC: 32.9 PG — SIGNIFICANT CHANGE UP (ref 27–34)
MCHC RBC-ENTMCNC: 33.2 G/DL — SIGNIFICANT CHANGE UP (ref 32–36)
MCV RBC AUTO: 99.1 FL — SIGNIFICANT CHANGE UP (ref 80–100)
MONOCYTES # BLD AUTO: 0.4 K/UL — SIGNIFICANT CHANGE UP (ref 0–0.9)
MONOCYTES NFR BLD AUTO: 7.1 % — SIGNIFICANT CHANGE UP (ref 2–14)
NEUTROPHILS # BLD AUTO: 2.8 K/UL — SIGNIFICANT CHANGE UP (ref 1.8–7.4)
NEUTROPHILS NFR BLD AUTO: 50.2 % — SIGNIFICANT CHANGE UP (ref 43–77)
PLATELET # BLD AUTO: 121 K/UL — LOW (ref 150–400)
POTASSIUM SERPL-MCNC: 3.8 MMOL/L — SIGNIFICANT CHANGE UP (ref 3.5–5.3)
POTASSIUM SERPL-SCNC: 3.8 MMOL/L — SIGNIFICANT CHANGE UP (ref 3.5–5.3)
RBC # BLD: 2.61 M/UL — LOW (ref 3.8–5.2)
RBC # FLD: 18.6 % — HIGH (ref 10.3–14.5)
SODIUM SERPL-SCNC: 145 MMOL/L — SIGNIFICANT CHANGE UP (ref 135–145)
WBC # BLD: 5.6 K/UL — SIGNIFICANT CHANGE UP (ref 3.8–10.5)
WBC # FLD AUTO: 5.6 K/UL — SIGNIFICANT CHANGE UP (ref 3.8–10.5)

## 2019-09-30 PROCEDURE — 99213 OFFICE O/P EST LOW 20 MIN: CPT

## 2019-09-30 RX ORDER — APREPITANT 80 MG/1
80 CAPSULE ORAL
Qty: 2 | Refills: 5 | Status: ACTIVE | COMMUNITY
Start: 2019-03-25 | End: 1900-01-01

## 2019-10-01 DIAGNOSIS — R11.2 NAUSEA WITH VOMITING, UNSPECIFIED: ICD-10-CM

## 2019-10-01 DIAGNOSIS — Z51.11 ENCOUNTER FOR ANTINEOPLASTIC CHEMOTHERAPY: ICD-10-CM

## 2019-10-03 NOTE — ASSESSMENT
[Palliative] : Goals of care discussed with patient: Palliative [Palliative Care Plan] : not applicable at this time [FreeTextEntry1] : 69 year old woman with gyn cancer on chemotherapy with Carboplatin and Taxol.\par She has completed 7 cycles.\par Anemia - continue to monitor.  Transfusion of PRBCs as needed for symptoms.\par Due for CT scans, prescription given.\par Patient to return to office after scans to discuss results and treatment plan.\par \par

## 2019-10-03 NOTE — HISTORY OF PRESENT ILLNESS
[Disease: _____________________] : Disease: [unfilled] [AJCC Stage: ____] : AJCC Stage: [unfilled] [Treatment Protocol] : Treatment Protocol [FreeTextEntry1] : Carboplatin and Taxol\par C1 - 4/5/19\par C2 - 5/14/19\par C3 - 6/20/19. \par C4- 7/11/19\par C5 - 8/5/19\par C6 - 8/29/19\par C7 - 9/30/19 [de-identified] : 68 y.o female with newly diagnosed metastatic uterine MMT , here with her son to discuss treatment options.\par \par Patient initially presented to the ER with a syncopal episode. As per patient she started with vaginal bleed six months ago. Bleeding was scan and she did not seek medical help until 2/24/19. On that day she saw Dr. Hatch who told patient that she might have fibroids.\par Af ter the doctors visit , she went to a restaurant to eat, and felt dizzy and passed out.\par She was taken to [facility], where she had scans of C/A/P.\par \par Ct angio of A/P 2/24/19: No abdominal aortic aneurysm or dissection. \par \par Heterogeneous endometrium distended to approximately 6.7 cm. The findings \par could represent cervical carcinoma with complex fluid in the endometrial \par canal versus endometrial carcinoma. Extensive pelvic and retroperitoneal \par lymphadenopathy consistent with metastatic disease. \par \par Focal thickening of the gallbladder wall at the fundus may reflect \par adenomyomatosis. Gallbladder ultrasound is recommended to exclude \par suspicious lesion. \par \par CT chest 2/26/19: Left supraclavicular lymphadenopathy worrisome for metastatic \par disease is amenable to ultrasound-guided biopsy. \par \par 2 nonspecific tiny left lower lobe nodules. \par \par Patient was seen by Gyn Onc surgery and had a biopsy of the prolapsing cervical mass. 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 [de-identified] : Patient is here for follow up and treatment.  She is just recovering from URI, feeling much better.  She continues to have fatigue.  Appetite is stable.  Vaginal spotting is stable and intermittent.  Peripheral neuropathy is stable.  She denies fever, chills, chest pain, SOB, abdominal pain, nausea, vomiting, change in bowel habits.

## 2019-10-13 ENCOUNTER — FORM ENCOUNTER (OUTPATIENT)
Age: 69
End: 2019-10-13

## 2019-10-14 ENCOUNTER — APPOINTMENT (OUTPATIENT)
Dept: CT IMAGING | Facility: IMAGING CENTER | Age: 69
End: 2019-10-14
Payer: COMMERCIAL

## 2019-10-14 ENCOUNTER — OUTPATIENT (OUTPATIENT)
Dept: OUTPATIENT SERVICES | Facility: HOSPITAL | Age: 69
LOS: 1 days | End: 2019-10-14
Payer: COMMERCIAL

## 2019-10-14 DIAGNOSIS — C55 MALIGNANT NEOPLASM OF UTERUS, PART UNSPECIFIED: ICD-10-CM

## 2019-10-14 DIAGNOSIS — Z98.890 OTHER SPECIFIED POSTPROCEDURAL STATES: Chronic | ICD-10-CM

## 2019-10-14 PROCEDURE — 71260 CT THORAX DX C+: CPT

## 2019-10-14 PROCEDURE — 71260 CT THORAX DX C+: CPT | Mod: 26

## 2019-10-14 PROCEDURE — 74177 CT ABD & PELVIS W/CONTRAST: CPT | Mod: 26

## 2019-10-14 PROCEDURE — 74177 CT ABD & PELVIS W/CONTRAST: CPT

## 2019-10-16 ENCOUNTER — RX RENEWAL (OUTPATIENT)
Age: 69
End: 2019-10-16

## 2019-10-16 RX ORDER — TRAMADOL HYDROCHLORIDE 50 MG/1
50 TABLET, COATED ORAL
Qty: 60 | Refills: 0 | Status: ACTIVE | COMMUNITY
Start: 2019-03-29 | End: 1900-01-01

## 2019-10-22 ENCOUNTER — LABORATORY RESULT (OUTPATIENT)
Age: 69
End: 2019-10-22

## 2019-10-22 ENCOUNTER — APPOINTMENT (OUTPATIENT)
Dept: HEMATOLOGY ONCOLOGY | Facility: CLINIC | Age: 69
End: 2019-10-22
Payer: COMMERCIAL

## 2019-10-22 ENCOUNTER — APPOINTMENT (OUTPATIENT)
Dept: INFUSION THERAPY | Facility: HOSPITAL | Age: 69
End: 2019-10-22

## 2019-10-22 VITALS
BODY MASS INDEX: 33.82 KG/M2 | DIASTOLIC BLOOD PRESSURE: 83 MMHG | WEIGHT: 189.69 LBS | HEART RATE: 110 BPM | RESPIRATION RATE: 17 BRPM | SYSTOLIC BLOOD PRESSURE: 127 MMHG | TEMPERATURE: 97.9 F | OXYGEN SATURATION: 100 %

## 2019-10-22 PROCEDURE — 99215 OFFICE O/P EST HI 40 MIN: CPT

## 2019-10-23 NOTE — HISTORY OF PRESENT ILLNESS
[AJCC Stage: ____] : AJCC Stage: [unfilled] [Disease: _____________________] : Disease: [unfilled] [Treatment Protocol] : Treatment Protocol [FreeTextEntry1] : Carboplatin and Taxol\par C1 - 4/5/19\par C2 - 5/14/19\par C3 - 6/20/19. \par C4- 7/11/19\par C5 - 8/5/19\par C6 - 8/29/19\par C7 - 9/30/19 [de-identified] : 68 y.o female with newly diagnosed metastatic uterine MMT , here with her son to discuss treatment options.\par \par Patient initially presented to the ER with a syncopal episode. As per patient she started with vaginal bleed six months ago. Bleeding was scan and she did not seek medical help until 2/24/19. On that day she saw Dr. Hatch who told patient that she might have fibroids.\par Af ter the doctors visit , she went to a restaurant to eat, and felt dizzy and passed out.\par She was taken to [facility], where she had scans of C/A/P.\par \par Ct angio of A/P 2/24/19: No abdominal aortic aneurysm or dissection. \par \par Heterogeneous endometrium distended to approximately 6.7 cm. The findings \par could represent cervical carcinoma with complex fluid in the endometrial \par canal versus endometrial carcinoma. Extensive pelvic and retroperitoneal \par lymphadenopathy consistent with metastatic disease. \par \par Focal thickening of the gallbladder wall at the fundus may reflect \par adenomyomatosis. Gallbladder ultrasound is recommended to exclude \par suspicious lesion. \par \par CT chest 2/26/19: Left supraclavicular lymphadenopathy worrisome for metastatic \par disease is amenable to ultrasound-guided biopsy. \par \par 2 nonspecific tiny left lower lobe nodules. \par \par Patient was seen by Gyn Onc surgery and had a biopsy of the prolapsing cervical mass. 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 [de-identified] : Patient here for a f/u visit.\par She recently had a scan that showed stable disease.\par She c/p worsening neuropathy.\par She has pain in both feet and fingers are numb.

## 2019-11-07 ENCOUNTER — OUTPATIENT (OUTPATIENT)
Dept: OUTPATIENT SERVICES | Facility: HOSPITAL | Age: 69
LOS: 1 days | Discharge: HOME | End: 2019-11-07

## 2019-11-07 DIAGNOSIS — C54.1 MALIGNANT NEOPLASM OF ENDOMETRIUM: ICD-10-CM

## 2019-11-07 DIAGNOSIS — Z98.890 OTHER SPECIFIED POSTPROCEDURAL STATES: Chronic | ICD-10-CM

## 2019-11-14 ENCOUNTER — APPOINTMENT (OUTPATIENT)
Dept: INFUSION THERAPY | Facility: HOSPITAL | Age: 69
End: 2019-11-14

## 2019-11-25 ENCOUNTER — RESULT REVIEW (OUTPATIENT)
Age: 69
End: 2019-11-25

## 2019-11-25 ENCOUNTER — APPOINTMENT (OUTPATIENT)
Dept: INFUSION THERAPY | Facility: HOSPITAL | Age: 69
End: 2019-11-25

## 2019-11-25 ENCOUNTER — LABORATORY RESULT (OUTPATIENT)
Age: 69
End: 2019-11-25

## 2019-11-25 ENCOUNTER — APPOINTMENT (OUTPATIENT)
Dept: HEMATOLOGY ONCOLOGY | Facility: CLINIC | Age: 69
End: 2019-11-25
Payer: COMMERCIAL

## 2019-11-25 VITALS
TEMPERATURE: 100 F | SYSTOLIC BLOOD PRESSURE: 122 MMHG | HEART RATE: 90 BPM | DIASTOLIC BLOOD PRESSURE: 79 MMHG | BODY MASS INDEX: 33.25 KG/M2 | OXYGEN SATURATION: 100 % | RESPIRATION RATE: 18 BRPM | WEIGHT: 186.51 LBS

## 2019-11-25 LAB
BASOPHILS # BLD AUTO: 0 K/UL — SIGNIFICANT CHANGE UP (ref 0–0.2)
BASOPHILS NFR BLD AUTO: 0 % — SIGNIFICANT CHANGE UP (ref 0–2)
EOSINOPHIL # BLD AUTO: 0 K/UL — SIGNIFICANT CHANGE UP (ref 0–0.5)
EOSINOPHIL NFR BLD AUTO: 0.2 % — SIGNIFICANT CHANGE UP (ref 0–6)
HCT VFR BLD CALC: 21.6 % — LOW (ref 34.5–45)
HGB BLD-MCNC: 7.3 G/DL — LOW (ref 11.5–15.5)
LYMPHOCYTES # BLD AUTO: 1.4 K/UL — SIGNIFICANT CHANGE UP (ref 1–3.3)
LYMPHOCYTES # BLD AUTO: 16.6 % — SIGNIFICANT CHANGE UP (ref 13–44)
MCHC RBC-ENTMCNC: 33.2 PG — SIGNIFICANT CHANGE UP (ref 27–34)
MCHC RBC-ENTMCNC: 33.6 G/DL — SIGNIFICANT CHANGE UP (ref 32–36)
MCV RBC AUTO: 98.9 FL — SIGNIFICANT CHANGE UP (ref 80–100)
MONOCYTES # BLD AUTO: 0.5 K/UL — SIGNIFICANT CHANGE UP (ref 0–0.9)
MONOCYTES NFR BLD AUTO: 6.1 % — SIGNIFICANT CHANGE UP (ref 2–14)
NEUTROPHILS # BLD AUTO: 6.4 K/UL — SIGNIFICANT CHANGE UP (ref 1.8–7.4)
NEUTROPHILS NFR BLD AUTO: 77.1 % — HIGH (ref 43–77)
PLATELET # BLD AUTO: 211 K/UL — SIGNIFICANT CHANGE UP (ref 150–400)
RBC # BLD: 2.19 M/UL — LOW (ref 3.8–5.2)
RBC # FLD: 17.6 % — HIGH (ref 10.3–14.5)
WBC # BLD: 8.3 K/UL — SIGNIFICANT CHANGE UP (ref 3.8–10.5)
WBC # FLD AUTO: 8.3 K/UL — SIGNIFICANT CHANGE UP (ref 3.8–10.5)

## 2019-11-25 PROCEDURE — 99214 OFFICE O/P EST MOD 30 MIN: CPT

## 2019-11-26 ENCOUNTER — APPOINTMENT (OUTPATIENT)
Dept: INFUSION THERAPY | Facility: HOSPITAL | Age: 69
End: 2019-11-26

## 2019-11-26 ENCOUNTER — RESULT REVIEW (OUTPATIENT)
Age: 69
End: 2019-11-26

## 2019-11-26 ENCOUNTER — OUTPATIENT (OUTPATIENT)
Dept: OUTPATIENT SERVICES | Facility: HOSPITAL | Age: 69
LOS: 1 days | End: 2019-11-26
Payer: COMMERCIAL

## 2019-11-26 DIAGNOSIS — C54.1 MALIGNANT NEOPLASM OF ENDOMETRIUM: ICD-10-CM

## 2019-11-26 DIAGNOSIS — Z98.890 OTHER SPECIFIED POSTPROCEDURAL STATES: Chronic | ICD-10-CM

## 2019-11-26 LAB
BLD GP AB SCN SERPL QL: NEGATIVE — SIGNIFICANT CHANGE UP
RH IG SCN BLD-IMP: POSITIVE — SIGNIFICANT CHANGE UP

## 2019-11-26 RX ORDER — LIDOCAINE AND PRILOCAINE 25; 25 MG/G; MG/G
2.5-2.5 CREAM TOPICAL
Qty: 1 | Refills: 3 | Status: ACTIVE | COMMUNITY
Start: 2019-11-26 | End: 1900-01-01

## 2019-11-27 PROCEDURE — 86900 BLOOD TYPING SEROLOGIC ABO: CPT

## 2019-11-27 PROCEDURE — 86850 RBC ANTIBODY SCREEN: CPT

## 2019-11-27 PROCEDURE — 86923 COMPATIBILITY TEST ELECTRIC: CPT

## 2019-11-27 PROCEDURE — 86901 BLOOD TYPING SEROLOGIC RH(D): CPT

## 2019-11-29 ENCOUNTER — APPOINTMENT (OUTPATIENT)
Dept: INFUSION THERAPY | Facility: HOSPITAL | Age: 69
End: 2019-11-29

## 2019-12-01 ENCOUNTER — RX RENEWAL (OUTPATIENT)
Age: 69
End: 2019-12-01

## 2019-12-01 RX ORDER — GABAPENTIN 300 MG/1
300 CAPSULE ORAL
Qty: 30 | Refills: 0 | Status: ACTIVE | COMMUNITY
Start: 2019-07-26 | End: 1900-01-01

## 2019-12-02 DIAGNOSIS — Z51.89 ENCOUNTER FOR OTHER SPECIFIED AFTERCARE: ICD-10-CM

## 2019-12-03 NOTE — ASSESSMENT
[Palliative] : Goals of care discussed with patient: Palliative [Palliative Care Plan] : not applicable at this time [FreeTextEntry1] : 69 year old woman with advanced gyn cancer on chemotherapy.\par PDL1 testing on tissue was negative.\par Due to increasing neuropathy but stability of disease on CT scans from October will continue with single agent Carboplatin.\par Anemia - hgb 7.3 today, will arrange for blood transfusion.\par Chemo scheduled for next week.\par RTC 3 weeks.

## 2019-12-03 NOTE — HISTORY OF PRESENT ILLNESS
[Disease: _____________________] : Disease: [unfilled] [AJCC Stage: ____] : AJCC Stage: [unfilled] [Treatment Protocol] : Treatment Protocol [de-identified] : 68 y.o female with newly diagnosed metastatic uterine MMT , here with her son to discuss treatment options.\par \par Patient initially presented to the ER with a syncopal episode. As per patient she started with vaginal bleed six months ago. Bleeding was scan and she did not seek medical help until 2/24/19. On that day she saw Dr. Hatch who told patient that she might have fibroids.\par Af ter the doctors visit , she went to a restaurant to eat, and felt dizzy and passed out.\par She was taken to [facility], where she had scans of C/A/P.\par \par Ct angio of A/P 2/24/19: No abdominal aortic aneurysm or dissection. \par \par Heterogeneous endometrium distended to approximately 6.7 cm. The findings \par could represent cervical carcinoma with complex fluid in the endometrial \par canal versus endometrial carcinoma. Extensive pelvic and retroperitoneal \par lymphadenopathy consistent with metastatic disease. \par \par Focal thickening of the gallbladder wall at the fundus may reflect \par adenomyomatosis. Gallbladder ultrasound is recommended to exclude \par suspicious lesion. \par \par CT chest 2/26/19: Left supraclavicular lymphadenopathy worrisome for metastatic \par disease is amenable to ultrasound-guided biopsy. \par \par 2 nonspecific tiny left lower lobe nodules. \par \par Patient was seen by Gyn Onc surgery and had a biopsy of the prolapsing cervical mass. 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 [FreeTextEntry1] : Carboplatin and Taxol\par C1 - 4/5/19\par C2 - 5/14/19\par C3 - 6/20/19. \par C4- 7/11/19\par C5 - 8/5/19\par C6 - 8/29/19\par C7 - 9/30/19 [de-identified] : Patient is here today for follow up, reports increased weakness and fatigue.  Denies SOB or palpitations.  Says appetite is stable but decreased, has lost some weight.  She says she eats one "big" meal per day.  Continues to have same intermittent mild abdominal pain, relieved with Tylenol. Denies any further bleeding.  Denies fever, chills, chest pain, SOB, nausea, vomiting, diarrhea, constipation.  Neuropathy is stable.

## 2019-12-04 ENCOUNTER — RESULT REVIEW (OUTPATIENT)
Age: 69
End: 2019-12-04

## 2019-12-04 ENCOUNTER — APPOINTMENT (OUTPATIENT)
Dept: INFUSION THERAPY | Facility: HOSPITAL | Age: 69
End: 2019-12-04

## 2019-12-04 LAB
BASOPHILS # BLD AUTO: 0 K/UL — SIGNIFICANT CHANGE UP (ref 0–0.2)
BASOPHILS NFR BLD AUTO: 0.1 % — SIGNIFICANT CHANGE UP (ref 0–2)
EOSINOPHIL # BLD AUTO: 0 K/UL — SIGNIFICANT CHANGE UP (ref 0–0.5)
EOSINOPHIL NFR BLD AUTO: 0 % — SIGNIFICANT CHANGE UP (ref 0–6)
HCT VFR BLD CALC: 30.7 % — LOW (ref 34.5–45)
HGB BLD-MCNC: 10.3 G/DL — LOW (ref 11.5–15.5)
LYMPHOCYTES # BLD AUTO: 1.9 K/UL — SIGNIFICANT CHANGE UP (ref 1–3.3)
LYMPHOCYTES # BLD AUTO: 26.4 % — SIGNIFICANT CHANGE UP (ref 13–44)
MCHC RBC-ENTMCNC: 31.4 PG — SIGNIFICANT CHANGE UP (ref 27–34)
MCHC RBC-ENTMCNC: 33.4 G/DL — SIGNIFICANT CHANGE UP (ref 32–36)
MCV RBC AUTO: 94 FL — SIGNIFICANT CHANGE UP (ref 80–100)
MONOCYTES # BLD AUTO: 0.5 K/UL — SIGNIFICANT CHANGE UP (ref 0–0.9)
MONOCYTES NFR BLD AUTO: 6.6 % — SIGNIFICANT CHANGE UP (ref 2–14)
NEUTROPHILS # BLD AUTO: 4.8 K/UL — SIGNIFICANT CHANGE UP (ref 1.8–7.4)
NEUTROPHILS NFR BLD AUTO: 66.9 % — SIGNIFICANT CHANGE UP (ref 43–77)
PLATELET # BLD AUTO: 213 K/UL — SIGNIFICANT CHANGE UP (ref 150–400)
RBC # BLD: 3.27 M/UL — LOW (ref 3.8–5.2)
RBC # FLD: 18.7 % — HIGH (ref 10.3–14.5)
WBC # BLD: 7.2 K/UL — SIGNIFICANT CHANGE UP (ref 3.8–10.5)
WBC # FLD AUTO: 7.2 K/UL — SIGNIFICANT CHANGE UP (ref 3.8–10.5)

## 2019-12-05 ENCOUNTER — OUTPATIENT (OUTPATIENT)
Dept: OUTPATIENT SERVICES | Facility: HOSPITAL | Age: 69
LOS: 1 days | Discharge: HOME | End: 2019-12-05

## 2019-12-05 DIAGNOSIS — R11.2 NAUSEA WITH VOMITING, UNSPECIFIED: ICD-10-CM

## 2019-12-05 DIAGNOSIS — Z98.890 OTHER SPECIFIED POSTPROCEDURAL STATES: Chronic | ICD-10-CM

## 2019-12-05 DIAGNOSIS — C54.1 MALIGNANT NEOPLASM OF ENDOMETRIUM: ICD-10-CM

## 2019-12-05 DIAGNOSIS — Z51.11 ENCOUNTER FOR ANTINEOPLASTIC CHEMOTHERAPY: ICD-10-CM

## 2019-12-12 ENCOUNTER — LABORATORY RESULT (OUTPATIENT)
Age: 69
End: 2019-12-12

## 2019-12-12 ENCOUNTER — APPOINTMENT (OUTPATIENT)
Dept: HEMATOLOGY ONCOLOGY | Facility: CLINIC | Age: 69
End: 2019-12-12
Payer: COMMERCIAL

## 2019-12-12 ENCOUNTER — RESULT REVIEW (OUTPATIENT)
Age: 69
End: 2019-12-12

## 2019-12-12 ENCOUNTER — APPOINTMENT (OUTPATIENT)
Dept: INFUSION THERAPY | Facility: HOSPITAL | Age: 69
End: 2019-12-12

## 2019-12-12 VITALS
RESPIRATION RATE: 18 BRPM | DIASTOLIC BLOOD PRESSURE: 89 MMHG | HEART RATE: 117 BPM | WEIGHT: 186.29 LBS | BODY MASS INDEX: 33.21 KG/M2 | TEMPERATURE: 97.9 F | SYSTOLIC BLOOD PRESSURE: 126 MMHG | OXYGEN SATURATION: 96 %

## 2019-12-12 LAB
BASOPHILS # BLD AUTO: 0 K/UL — SIGNIFICANT CHANGE UP (ref 0–0.2)
BASOPHILS NFR BLD AUTO: 0.3 % — SIGNIFICANT CHANGE UP (ref 0–2)
EOSINOPHIL # BLD AUTO: 0 K/UL — SIGNIFICANT CHANGE UP (ref 0–0.5)
EOSINOPHIL NFR BLD AUTO: 0.6 % — SIGNIFICANT CHANGE UP (ref 0–6)
HCT VFR BLD CALC: 29.5 % — LOW (ref 34.5–45)
HGB BLD-MCNC: 9.9 G/DL — LOW (ref 11.5–15.5)
LYMPHOCYTES # BLD AUTO: 1.9 K/UL — SIGNIFICANT CHANGE UP (ref 1–3.3)
LYMPHOCYTES # BLD AUTO: 24.4 % — SIGNIFICANT CHANGE UP (ref 13–44)
MCHC RBC-ENTMCNC: 31.7 PG — SIGNIFICANT CHANGE UP (ref 27–34)
MCHC RBC-ENTMCNC: 33.6 G/DL — SIGNIFICANT CHANGE UP (ref 32–36)
MCV RBC AUTO: 94.3 FL — SIGNIFICANT CHANGE UP (ref 80–100)
MONOCYTES # BLD AUTO: 0.6 K/UL — SIGNIFICANT CHANGE UP (ref 0–0.9)
MONOCYTES NFR BLD AUTO: 7.9 % — SIGNIFICANT CHANGE UP (ref 2–14)
NEUTROPHILS # BLD AUTO: 5.3 K/UL — SIGNIFICANT CHANGE UP (ref 1.8–7.4)
NEUTROPHILS NFR BLD AUTO: 66.8 % — SIGNIFICANT CHANGE UP (ref 43–77)
PLATELET # BLD AUTO: 185 K/UL — SIGNIFICANT CHANGE UP (ref 150–400)
RBC # BLD: 3.13 M/UL — LOW (ref 3.8–5.2)
RBC # FLD: 18.2 % — HIGH (ref 10.3–14.5)
WBC # BLD: 7.9 K/UL — SIGNIFICANT CHANGE UP (ref 3.8–10.5)
WBC # FLD AUTO: 7.9 K/UL — SIGNIFICANT CHANGE UP (ref 3.8–10.5)

## 2019-12-12 PROCEDURE — 99214 OFFICE O/P EST MOD 30 MIN: CPT

## 2019-12-17 NOTE — HISTORY OF PRESENT ILLNESS
[AJCC Stage: ____] : AJCC Stage: [unfilled] [Disease: _____________________] : Disease: [unfilled] [Treatment Protocol] : Treatment Protocol [de-identified] : 68 y.o female with newly diagnosed metastatic uterine MMT , here with her son to discuss treatment options.\par \par Patient initially presented to the ER with a syncopal episode. As per patient she started with vaginal bleed six months ago. Bleeding was scan and she did not seek medical help until 2/24/19. On that day she saw Dr. Hatch who told patient that she might have fibroids.\par Af ter the doctors visit , she went to a restaurant to eat, and felt dizzy and passed out.\par She was taken to [facility], where she had scans of C/A/P.\par \par Ct angio of A/P 2/24/19: No abdominal aortic aneurysm or dissection. \par \par Heterogeneous endometrium distended to approximately 6.7 cm. The findings \par could represent cervical carcinoma with complex fluid in the endometrial \par canal versus endometrial carcinoma. Extensive pelvic and retroperitoneal \par lymphadenopathy consistent with metastatic disease. \par \par Focal thickening of the gallbladder wall at the fundus may reflect \par adenomyomatosis. Gallbladder ultrasound is recommended to exclude \par suspicious lesion. \par \par CT chest 2/26/19: Left supraclavicular lymphadenopathy worrisome for metastatic \par disease is amenable to ultrasound-guided biopsy. \par \par 2 nonspecific tiny left lower lobe nodules. \par \par Patient was seen by Gyn Onc surgery and had a biopsy of the prolapsing cervical mass. 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 [FreeTextEntry1] : Carboplatin and Taxol\par C1 - 4/5/19\par C2 - 5/14/19\par C3 - 6/20/19. \par C4- 7/11/19\par C5 - 8/5/19\par C6 - 8/29/19\par C7 - 9/30/19\par Carboplatin - single agent\par C1 - 12/4/19 [de-identified] : Patient is here for follow up, overall tolerating treatment well,  Fatigue is stable.  Peripheral neuropathy is stable. No bleeding presently.  Denies fever, chills, chest pain, SOB, abdominal pain, nausea, vomiting, diarrhea, constipation.

## 2019-12-17 NOTE — ASSESSMENT
[Palliative Care Plan] : not applicable at this time [Palliative] : Goals of care discussed with patient: Palliative [FreeTextEntry1] : 69 year old woman with advanced gyn cancer on chemotherapy with single agent carboplatin.\par Tolerating treatment well.\par Will get scans after another 2 cycles or sooner if clinically indicated.

## 2020-01-03 ENCOUNTER — APPOINTMENT (OUTPATIENT)
Dept: INFUSION THERAPY | Facility: HOSPITAL | Age: 70
End: 2020-01-03

## 2020-01-13 ENCOUNTER — OUTPATIENT (OUTPATIENT)
Dept: OUTPATIENT SERVICES | Facility: HOSPITAL | Age: 70
LOS: 1 days | Discharge: HOME | End: 2020-01-13

## 2020-01-13 DIAGNOSIS — Z98.890 OTHER SPECIFIED POSTPROCEDURAL STATES: Chronic | ICD-10-CM

## 2020-01-13 DIAGNOSIS — C54.1 MALIGNANT NEOPLASM OF ENDOMETRIUM: ICD-10-CM

## 2020-01-17 ENCOUNTER — APPOINTMENT (OUTPATIENT)
Dept: INFUSION THERAPY | Facility: HOSPITAL | Age: 70
End: 2020-01-17

## 2020-01-17 ENCOUNTER — APPOINTMENT (OUTPATIENT)
Dept: HEMATOLOGY ONCOLOGY | Facility: CLINIC | Age: 70
End: 2020-01-17

## 2020-01-24 ENCOUNTER — LABORATORY RESULT (OUTPATIENT)
Age: 70
End: 2020-01-24

## 2020-01-24 ENCOUNTER — APPOINTMENT (OUTPATIENT)
Dept: HEMATOLOGY ONCOLOGY | Facility: CLINIC | Age: 70
End: 2020-01-24

## 2020-01-27 ENCOUNTER — INPATIENT (INPATIENT)
Facility: HOSPITAL | Age: 70
LOS: 23 days | Discharge: OTHER INSTITUTION | End: 2020-02-20
Attending: HOSPITALIST | Admitting: HOSPITALIST
Payer: MEDICARE

## 2020-01-27 ENCOUNTER — APPOINTMENT (OUTPATIENT)
Dept: HEMATOLOGY ONCOLOGY | Facility: CLINIC | Age: 70
End: 2020-01-27
Payer: MEDICARE

## 2020-01-27 ENCOUNTER — APPOINTMENT (OUTPATIENT)
Dept: INFUSION THERAPY | Facility: HOSPITAL | Age: 70
End: 2020-01-27

## 2020-01-27 VITALS
TEMPERATURE: 98.3 F | DIASTOLIC BLOOD PRESSURE: 56 MMHG | HEART RATE: 107 BPM | SYSTOLIC BLOOD PRESSURE: 87 MMHG | RESPIRATION RATE: 17 BRPM | OXYGEN SATURATION: 97 %

## 2020-01-27 VITALS
DIASTOLIC BLOOD PRESSURE: 63 MMHG | HEART RATE: 104 BPM | RESPIRATION RATE: 18 BRPM | TEMPERATURE: 98 F | OXYGEN SATURATION: 100 % | SYSTOLIC BLOOD PRESSURE: 90 MMHG

## 2020-01-27 DIAGNOSIS — K59.00 CONSTIPATION, UNSPECIFIED: ICD-10-CM

## 2020-01-27 DIAGNOSIS — C55 MALIGNANT NEOPLASM OF UTERUS, PART UNSPECIFIED: ICD-10-CM

## 2020-01-27 DIAGNOSIS — D50.0 IRON DEFICIENCY ANEMIA SECONDARY TO BLOOD LOSS (CHRONIC): ICD-10-CM

## 2020-01-27 DIAGNOSIS — Z29.9 ENCOUNTER FOR PROPHYLACTIC MEASURES, UNSPECIFIED: ICD-10-CM

## 2020-01-27 DIAGNOSIS — N17.9 ACUTE KIDNEY FAILURE, UNSPECIFIED: ICD-10-CM

## 2020-01-27 DIAGNOSIS — R65.10 SYSTEMIC INFLAMMATORY RESPONSE SYNDROME (SIRS) OF NON-INFECTIOUS ORIGIN WITHOUT ACUTE ORGAN DYSFUNCTION: ICD-10-CM

## 2020-01-27 DIAGNOSIS — Z98.890 OTHER SPECIFIED POSTPROCEDURAL STATES: Chronic | ICD-10-CM

## 2020-01-27 DIAGNOSIS — D64.9 ANEMIA, UNSPECIFIED: ICD-10-CM

## 2020-01-27 DIAGNOSIS — Z02.9 ENCOUNTER FOR ADMINISTRATIVE EXAMINATIONS, UNSPECIFIED: ICD-10-CM

## 2020-01-27 DIAGNOSIS — I10 ESSENTIAL (PRIMARY) HYPERTENSION: ICD-10-CM

## 2020-01-27 LAB
ALBUMIN SERPL ELPH-MCNC: 3.3 G/DL — SIGNIFICANT CHANGE UP (ref 3.3–5)
ALP SERPL-CCNC: 71 U/L — SIGNIFICANT CHANGE UP (ref 40–120)
ALT FLD-CCNC: 10 U/L — SIGNIFICANT CHANGE UP (ref 4–33)
ANION GAP SERPL CALC-SCNC: 19 MMO/L — HIGH (ref 7–14)
APTT BLD: 26.7 SEC — LOW (ref 27.5–36.3)
AST SERPL-CCNC: 15 U/L — SIGNIFICANT CHANGE UP (ref 4–32)
BASE EXCESS BLDV CALC-SCNC: -0.4 MMOL/L — SIGNIFICANT CHANGE UP
BASOPHILS # BLD AUTO: 0.02 K/UL — SIGNIFICANT CHANGE UP (ref 0–0.2)
BASOPHILS NFR BLD AUTO: 0.2 % — SIGNIFICANT CHANGE UP (ref 0–2)
BASOPHILS NFR SPEC: 0 % — SIGNIFICANT CHANGE UP (ref 0–2)
BILIRUB SERPL-MCNC: 0.3 MG/DL — SIGNIFICANT CHANGE UP (ref 0.2–1.2)
BLASTS # FLD: 0 % — SIGNIFICANT CHANGE UP (ref 0–0)
BLD GP AB SCN SERPL QL: NEGATIVE — SIGNIFICANT CHANGE UP
BLOOD GAS VENOUS - CREATININE: 2.36 MG/DL — HIGH (ref 0.5–1.3)
BUN SERPL-MCNC: 24 MG/DL — HIGH (ref 7–23)
CALCIUM SERPL-MCNC: 9.4 MG/DL — SIGNIFICANT CHANGE UP (ref 8.4–10.5)
CHLORIDE BLDV-SCNC: 103 MMOL/L — SIGNIFICANT CHANGE UP (ref 96–108)
CHLORIDE SERPL-SCNC: 98 MMOL/L — SIGNIFICANT CHANGE UP (ref 98–107)
CO2 SERPL-SCNC: 20 MMOL/L — LOW (ref 22–31)
CREAT SERPL-MCNC: 2.37 MG/DL — HIGH (ref 0.5–1.3)
EOSINOPHIL # BLD AUTO: 0 K/UL — SIGNIFICANT CHANGE UP (ref 0–0.5)
EOSINOPHIL NFR BLD AUTO: 0 % — SIGNIFICANT CHANGE UP (ref 0–6)
EOSINOPHIL NFR FLD: 0 % — SIGNIFICANT CHANGE UP (ref 0–6)
GAS PNL BLDV: 139 MMOL/L — SIGNIFICANT CHANGE UP (ref 136–146)
GIANT PLATELETS BLD QL SMEAR: PRESENT — SIGNIFICANT CHANGE UP
GLUCOSE BLDV-MCNC: 127 MG/DL — HIGH (ref 70–99)
GLUCOSE SERPL-MCNC: 135 MG/DL — HIGH (ref 70–99)
HCO3 BLDV-SCNC: 24 MMOL/L — SIGNIFICANT CHANGE UP (ref 20–27)
HCT VFR BLD CALC: 19.6 % — CRITICAL LOW (ref 34.5–45)
HCT VFR BLDV CALC: 18.4 % — CRITICAL LOW (ref 34.5–45)
HGB BLD-MCNC: 5.9 G/DL — CRITICAL LOW (ref 11.5–15.5)
HGB BLDV-MCNC: 5.8 G/DL — CRITICAL LOW (ref 11.5–15.5)
HYPOCHROMIA BLD QL: SLIGHT — SIGNIFICANT CHANGE UP
IMM GRANULOCYTES NFR BLD AUTO: 1.7 % — HIGH (ref 0–1.5)
INR BLD: 1.39 — HIGH (ref 0.88–1.17)
LACTATE BLDV-MCNC: 1.8 MMOL/L — SIGNIFICANT CHANGE UP (ref 0.5–2)
LYMPHOCYTES # BLD AUTO: 1.58 K/UL — SIGNIFICANT CHANGE UP (ref 1–3.3)
LYMPHOCYTES # BLD AUTO: 12.5 % — LOW (ref 13–44)
LYMPHOCYTES NFR SPEC AUTO: 7.9 % — LOW (ref 13–44)
MACROCYTES BLD QL: SLIGHT — SIGNIFICANT CHANGE UP
MCHC RBC-ENTMCNC: 29.5 PG — SIGNIFICANT CHANGE UP (ref 27–34)
MCHC RBC-ENTMCNC: 30.1 % — LOW (ref 32–36)
MCV RBC AUTO: 98 FL — SIGNIFICANT CHANGE UP (ref 80–100)
METAMYELOCYTES # FLD: 0 % — SIGNIFICANT CHANGE UP (ref 0–1)
MICROCYTES BLD QL: SLIGHT — SIGNIFICANT CHANGE UP
MONOCYTES # BLD AUTO: 1.07 K/UL — HIGH (ref 0–0.9)
MONOCYTES NFR BLD AUTO: 8.5 % — SIGNIFICANT CHANGE UP (ref 2–14)
MONOCYTES NFR BLD: 8.8 % — SIGNIFICANT CHANGE UP (ref 2–9)
MYELOCYTES NFR BLD: 0 % — SIGNIFICANT CHANGE UP (ref 0–0)
NEUTROPHIL AB SER-ACNC: 75.4 % — SIGNIFICANT CHANGE UP (ref 43–77)
NEUTROPHILS # BLD AUTO: 9.75 K/UL — HIGH (ref 1.8–7.4)
NEUTROPHILS NFR BLD AUTO: 77.1 % — HIGH (ref 43–77)
NEUTS BAND # BLD: 4.4 % — SIGNIFICANT CHANGE UP (ref 0–6)
NRBC # FLD: 0 K/UL — SIGNIFICANT CHANGE UP (ref 0–0)
OTHER - HEMATOLOGY %: 0 — SIGNIFICANT CHANGE UP
OVALOCYTES BLD QL SMEAR: SLIGHT — SIGNIFICANT CHANGE UP
PCO2 BLDV: 42 MMHG — SIGNIFICANT CHANGE UP (ref 41–51)
PH BLDV: 7.38 PH — SIGNIFICANT CHANGE UP (ref 7.32–7.43)
PLATELET # BLD AUTO: 324 K/UL — SIGNIFICANT CHANGE UP (ref 150–400)
PLATELET COUNT - ESTIMATE: NORMAL — SIGNIFICANT CHANGE UP
PMV BLD: 10 FL — SIGNIFICANT CHANGE UP (ref 7–13)
PO2 BLDV: 34 MMHG — LOW (ref 35–40)
POIKILOCYTOSIS BLD QL AUTO: SLIGHT — SIGNIFICANT CHANGE UP
POLYCHROMASIA BLD QL SMEAR: SLIGHT — SIGNIFICANT CHANGE UP
POTASSIUM BLDV-SCNC: 3.6 MMOL/L — SIGNIFICANT CHANGE UP (ref 3.4–4.5)
POTASSIUM SERPL-MCNC: 3.8 MMOL/L — SIGNIFICANT CHANGE UP (ref 3.5–5.3)
POTASSIUM SERPL-SCNC: 3.8 MMOL/L — SIGNIFICANT CHANGE UP (ref 3.5–5.3)
PROMYELOCYTES # FLD: 0 % — SIGNIFICANT CHANGE UP (ref 0–0)
PROT SERPL-MCNC: 7.9 G/DL — SIGNIFICANT CHANGE UP (ref 6–8.3)
PROTHROM AB SERPL-ACNC: 15.6 SEC — HIGH (ref 9.8–13.1)
RBC # BLD: 2 M/UL — LOW (ref 3.8–5.2)
RBC # FLD: 18.8 % — HIGH (ref 10.3–14.5)
RH IG SCN BLD-IMP: POSITIVE — SIGNIFICANT CHANGE UP
ROULEAUX BLD QL SMEAR: PRESENT — SIGNIFICANT CHANGE UP
SAO2 % BLDV: 49.3 % — LOW (ref 60–85)
SODIUM SERPL-SCNC: 137 MMOL/L — SIGNIFICANT CHANGE UP (ref 135–145)
VARIANT LYMPHS # BLD: 3.5 % — SIGNIFICANT CHANGE UP
WBC # BLD: 12.64 K/UL — HIGH (ref 3.8–10.5)
WBC # FLD AUTO: 12.64 K/UL — HIGH (ref 3.8–10.5)

## 2020-01-27 PROCEDURE — 71250 CT THORAX DX C-: CPT | Mod: 26

## 2020-01-27 PROCEDURE — 99214 OFFICE O/P EST MOD 30 MIN: CPT

## 2020-01-27 PROCEDURE — 74176 CT ABD & PELVIS W/O CONTRAST: CPT | Mod: 26

## 2020-01-27 PROCEDURE — ZZZZZ: CPT

## 2020-01-27 PROCEDURE — 99223 1ST HOSP IP/OBS HIGH 75: CPT | Mod: GC

## 2020-01-27 RX ORDER — LOSARTAN POTASSIUM 100 MG/1
100 TABLET, FILM COATED ORAL DAILY
Refills: 0 | Status: DISCONTINUED | OUTPATIENT
Start: 2020-01-27 | End: 2020-01-27

## 2020-01-27 RX ORDER — ACETAMINOPHEN 500 MG
650 TABLET ORAL ONCE
Refills: 0 | Status: COMPLETED | OUTPATIENT
Start: 2020-01-27 | End: 2020-01-27

## 2020-01-27 RX ORDER — ONDANSETRON 8 MG/1
4 TABLET, FILM COATED ORAL EVERY 8 HOURS
Refills: 0 | Status: DISCONTINUED | OUTPATIENT
Start: 2020-01-27 | End: 2020-02-20

## 2020-01-27 RX ORDER — SODIUM CHLORIDE 9 MG/ML
1000 INJECTION INTRAMUSCULAR; INTRAVENOUS; SUBCUTANEOUS
Refills: 0 | Status: DISCONTINUED | OUTPATIENT
Start: 2020-01-27 | End: 2020-02-04

## 2020-01-27 RX ORDER — POLYETHYLENE GLYCOL 3350 17 G/17G
17 POWDER, FOR SOLUTION ORAL DAILY
Refills: 0 | Status: DISCONTINUED | OUTPATIENT
Start: 2020-01-27 | End: 2020-02-20

## 2020-01-27 RX ORDER — SODIUM CHLORIDE 9 MG/ML
1000 INJECTION INTRAMUSCULAR; INTRAVENOUS; SUBCUTANEOUS ONCE
Refills: 0 | Status: COMPLETED | OUTPATIENT
Start: 2020-01-27 | End: 2020-01-27

## 2020-01-27 RX ORDER — GABAPENTIN 400 MG/1
300 CAPSULE ORAL THREE TIMES A DAY
Refills: 0 | Status: DISCONTINUED | OUTPATIENT
Start: 2020-01-27 | End: 2020-01-28

## 2020-01-27 RX ADMIN — Medication 650 MILLIGRAM(S): at 20:55

## 2020-01-27 RX ADMIN — Medication 650 MILLIGRAM(S): at 20:02

## 2020-01-27 RX ADMIN — SODIUM CHLORIDE 1000 MILLILITER(S): 9 INJECTION INTRAMUSCULAR; INTRAVENOUS; SUBCUTANEOUS at 15:32

## 2020-01-27 RX ADMIN — SODIUM CHLORIDE 1000 MILLILITER(S): 9 INJECTION INTRAMUSCULAR; INTRAVENOUS; SUBCUTANEOUS at 14:34

## 2020-01-27 NOTE — H&P ADULT - NSHPREVIEWOFSYSTEMS_GEN_ALL_CORE
REVIEW OF SYSTEMS:    CONSTITUTIONAL: No weakness, fevers or chills  EYES/ENT: No visual changes;  No vertigo or throat pain   NECK: No pain or stiffness  RESPIRATORY: No cough, wheezing, hemoptysis; No shortness of breath  CARDIOVASCULAR: No chest pain or palpitations  GASTROINTESTINAL: Mild chronic RLQ abd pain and constipation.  No epigastric pain. No nausea, vomiting, or hematemesis; No diarrhea or melena or hematochezia.  GENITOURINARY: No dysuria, frequency or hematuria  NEUROLOGICAL: No numbness or weakness  SKIN: No itching, burning, rashes, or lesions   All other review of systems is negative unless indicated above. Constitutional Symptoms: No weakness, fevers, chills, weight loss  Eyes: No visual changes, headache, eye pain, double vision  Ears, Nose, Mouth, Throat: No runny nose, sinus pain, ear pain, tinnitus, sore throat, dysphagia, odynophagia  Cardiovascular: No chest pain, palpitations, edema  Respiratory: No cough, wheezing, hemoptysis, shortness of breath  Gastrointestinal: Mild chronic RLQ abd pain and constipation.  No epigastric pain. No nausea, vomiting, or hematemesis; No diarrhea or melena or hematochezia  GYN: vaginal bleeding  Genitourinary: No dysuria, frequency, hematuria  Musculoskeletal: No joint pain, joint swelling, decreased ROM  Skin: No pruritus, rashes, lesions, wounds  Neurologic:  No seizures, headache, paraesthesia, numbness, limb weakness    Positives and pertinent negatives noted and all other systems negative.

## 2020-01-27 NOTE — CONSULT NOTE ADULT - PROBLEM SELECTOR RECOMMENDATION 9
- No acute GYN interventions at this time. Patient not a surgical candidate.   - F/u post-transfusion CBC   - STEVEN with Cr 2.37 <- 1.6, likely pre-renal. F/u repeat BMP s/p transfusion, fluid resuscitation    - Recommend daily pad counts   - If significant vaginal bleeding, consider vaginal packing and radiation therapy   - Gyn Onc will continue to follow     Belinda Malloy PGY2  d/w Dr. Dowling

## 2020-01-27 NOTE — ED PROVIDER NOTE - PHYSICAL EXAMINATION
General appearance: NAD, conversant, afebrile    Eyes: anicteric sclerae, no conjunctival pallor   Neck: Trachea midline; Full range of motion, supple   Pulm: CTA bl, normal respiratory effort and no intercostal retractions, normal work of breathing   CV: RRR, No murmurs, rubs, or gallops   Abdomen: Soft, suprapubic tenderness to palpation, non-distended   Extremities: No peripheral edema    Skin: Dry, normal temperature, turgor and texture; no rash   Psych: Appropriate affect, cooperative General appearance: NAD, conversant, afebrile    Eyes: anicteric sclerae, no conjunctival pallor   Neck: Trachea midline; Full range of motion, supple   Pulm: CTA bl, normal respiratory effort and no intercostal retractions, normal work of breathing   CV: RRR, No murmurs, rubs, or gallops   Abdomen: Soft, suprapubic tenderness to palpation, non-distended   Extremities: No peripheral edema    Skin: Dry, normal temperature, turgor and texture; no rash   Psych: Appropriate affect, cooperative   Gyn: Gross blood externally,

## 2020-01-27 NOTE — H&P ADULT - PROBLEM SELECTOR PLAN 5
-Hold home losartan as BP soft and STEVEN -Stage IV uterine cancer on Carboplatin, last dose 12/4.    -s/p C7 with carboplatin/Taxol, now on single agent  -f/u onc recs  -f/u gyn onc recs

## 2020-01-27 NOTE — H&P ADULT - PROBLEM SELECTOR PLAN 1
-likely 2/2 vaginal bleed in the setting of uterine cancer.   -hgb=5.9, baseline 8-10.   -receiving the second Unit PRBC now  -post transfusion CBC  -maintain active t/s  -Transfuse to keep >7  -f/u GYN recs  -May need rad-onc if bleeding does not side.

## 2020-01-27 NOTE — H&P ADULT - NSHPLABSRESULTS_GEN_ALL_CORE
5.9    12.64 )-----------( 324      ( 27 Jan 2020 13:00 )             19.6       01-27    137  |  98  |  24<H>  ----------------------------<  135<H>  3.8   |  20<L>  |  2.37<H>    Ca    9.4      27 Jan 2020 13:00    TPro  7.9  /  Alb  3.3  /  TBili  0.3  /  DBili  x   /  AST  15  /  ALT  10  /  AlkPhos  71  01-27                  PT/INR - ( 27 Jan 2020 13:00 )   PT: 15.6 SEC;   INR: 1.39          PTT - ( 27 Jan 2020 13:00 )  PTT:26.7 SEC    EXAM:  CT ABDOMEN AND PELVIS      EXAM:  CT CHEST        PROCEDURE DATE:  Jan 27 2020         INTERPRETATION:  CLINICAL INFORMATION: Uterine carcinosarcoma. Vaginal bleeding and hypotension.    COMPARISON: CT chest, abdomen and pelvis 10/14/2019.    PROCEDURE:   CT of the Chest, Abdomen and Pelvis was performed without intravenous contrast.   Intravenous contrast: None.  Oral contrast: None.  Sagittal and coronal reformats were performed.    FINDINGS:    CHEST:     LUNGS AND LARGE AIRWAYS: Patent central airways. Bibasilar subsegmental atelectasis. Previously described left lower lobe subpleural nodule is not appreciated on this study.  PLEURA: No pleural effusion.  VESSELS: Within normal limits.  HEART: Heart size is normal. Small pericardial effusion, increased from prior.  MEDIASTINUM AND OWEN: No lymphadenopathy.  CHEST WALL AND LOWER NECK: Mediport with tip in SVC. Left supraclavicular lymph node (2:8) measuring 0.7 cm, unchanged.    ABDOMEN AND PELVIS:    LIVER: Within normal limits.  BILE DUCTS: Normal caliber.  GALLBLADDER: Focal thickening at the gallbladder fundus, unchanged and likely related to adenomyomatosis..  SPLEEN: Within normal limits.  PANCREAS: Within normal limits.  ADRENALS: There is new 5.6 x 3.9 cm mass adjacent to the left adrenal gland consistent with metastatic disease. Minimal thickening of the right adrenal gland is unchanged.  KIDNEYS/URETERS: Within normal limits.    BLADDER: Within normal limits.  REPRODUCTIVE ORGANS: Redemonstration is marked distention of the endometrial cavity with tumor measuring up to 11.2 cm in largest axial dimension, previously measuring 10.2 cm. The cervical portion of the mass measures 9.1 cm and previously measured 6.8 cm. Foci of air are noted in the cervix likelyrelated to recent biopsy or areas of necrosis.    BOWEL: No bowel obstruction. Appendix is normal.  PERITONEUM: No ascites.  VESSELS: Atherosclerotic changes.  RETROPERITONEUM/LYMPH NODES:     New large mass adjacent to the left adrenal gland as described above.     Right obturator lymph noted (2:24) measuring 1.4 x 1.0 cm, previously 1.4 x 1.4 cm.    ABDOMINAL WALL: Within normal limits.  BONES: Degenerative changes.    IMPRESSION:     Large endometrial and cervical mass which increased in size as compared with the prior study consistent patient's known carcinosarcoma.  Foci of air are noted in the cervix likely related to recent biopsy or areas of necrosis.    New left para-aortic mass consistent metastatic disease. 5.9    12.64 )-----------( 324      ( 27 Jan 2020 13:00 )             19.6       01-27    137  |  98  |  24<H>  ----------------------------<  135<H>  3.8   |  20<L>  |  2.37<H>    Ca    9.4      27 Jan 2020 13:00    TPro  7.9  /  Alb  3.3  /  TBili  0.3  /  DBili  x   /  AST  15  /  ALT  10  /  AlkPhos  71  01-27    PT/INR - ( 27 Jan 2020 13:00 )   PT: 15.6 SEC;   INR: 1.39          PTT - ( 27 Jan 2020 13:00 )  PTT:26.7 SEC    EXAM:  CT ABDOMEN AND PELVIS      EXAM:  CT CHEST        PROCEDURE DATE:  Jan 27 2020     INTERPRETATION:  CLINICAL INFORMATION: Uterine carcinosarcoma. Vaginal bleeding and hypotension.    COMPARISON: CT chest, abdomen and pelvis 10/14/2019.    PROCEDURE:   CT of the Chest, Abdomen and Pelvis was performed without intravenous contrast.   Intravenous contrast: None.  Oral contrast: None.  Sagittal and coronal reformats were performed.    FINDINGS:    CHEST:     LUNGS AND LARGE AIRWAYS: Patent central airways. Bibasilar subsegmental atelectasis. Previously described left lower lobe subpleural nodule is not appreciated on this study.  PLEURA: No pleural effusion.  VESSELS: Within normal limits.  HEART: Heart size is normal. Small pericardial effusion, increased from prior.  MEDIASTINUM AND OWEN: No lymphadenopathy.  CHEST WALL AND LOWER NECK: Mediport with tip in SVC. Left supraclavicular lymph node (2:8) measuring 0.7 cm, unchanged.    ABDOMEN AND PELVIS:    LIVER: Within normal limits.  BILE DUCTS: Normal caliber.  GALLBLADDER: Focal thickening at the gallbladder fundus, unchanged and likely related to adenomyomatosis..  SPLEEN: Within normal limits.  PANCREAS: Within normal limits.  ADRENALS: There is new 5.6 x 3.9 cm mass adjacent to the left adrenal gland consistent with metastatic disease. Minimal thickening of the right adrenal gland is unchanged.  KIDNEYS/URETERS: Within normal limits.    BLADDER: Within normal limits.  REPRODUCTIVE ORGANS: Redemonstration is marked distention of the endometrial cavity with tumor measuring up to 11.2 cm in largest axial dimension, previously measuring 10.2 cm. The cervical portion of the mass measures 9.1 cm and previously measured 6.8 cm. Foci of air are noted in the cervix likelyrelated to recent biopsy or areas of necrosis.    BOWEL: No bowel obstruction. Appendix is normal.  PERITONEUM: No ascites.  VESSELS: Atherosclerotic changes.  RETROPERITONEUM/LYMPH NODES:     New large mass adjacent to the left adrenal gland as described above.     Right obturator lymph noted (2:24) measuring 1.4 x 1.0 cm, previously 1.4 x 1.4 cm.    ABDOMINAL WALL: Within normal limits.  BONES: Degenerative changes.    IMPRESSION:     Large endometrial and cervical mass which increased in size as compared with the prior study consistent patient's known carcinosarcoma.  Foci of air are noted in the cervix likely related to recent biopsy or areas of necrosis.    New left para-aortic mass consistent metastatic disease.

## 2020-01-27 NOTE — H&P ADULT - PROBLEM SELECTOR PLAN 7
DVT PPx  -SCDs for now in setting of active bleed Transitions of Care Status:  1.  Name of PCP: Dr. Asiya Garcia  2.  PCP Contacted on Admission: [ ] Y    [x ] N    3.  PCP contacted at Discharge: [ ] Y    [ ] N    [ ] N/A  4.  Post-Discharge Appointment Date and Location:  5.  Summary of Handoff given to PCP:

## 2020-01-27 NOTE — ED PROVIDER NOTE - ATTENDING CONTRIBUTION TO CARE
69F h/o uterine cancer on chemo presents with hypotension. Reports she was getting routine chemo today and noted to be hypotensive, so referred to ED. Had vaginal bleeding since yesterday, passed a clot, now with minimal bleeding. No CP/SOB, no palpitations. No v/d. Does note increased foul smelling discharge. On exam nad, mmm, rrr, lungs CTA b/l, abd soft NT/ND,  with small bleeding, moderate discharge, 2+ pulses, no edema, no rash, alert, speech clear.  Spoke w gyn-onc, patient does not get chemo consistently. Recommend repeat imaging. Will likely need admission for transfusion.

## 2020-01-27 NOTE — ED PROVIDER NOTE - PROGRESS NOTE DETAILS
Evangelist: Spoke with DAMI Del Real at [facility] patient has not been following routinely for chemo.  Today is first day patient has been seen since december, concerned disease has progressed, recommending cts Spoke with GYN. Likely anemia due to chemo, will follow inpatient. admit to Dr. Vines

## 2020-01-27 NOTE — H&P ADULT - PROBLEM SELECTOR PLAN 2
-Cr=2.37, baseline 1.5=-1.7  -likely pre renal in the setting of acute blood loss anemia  -getting second PRBC now  -s/p 1L IVF in the ED  -start on NS @100cc/hr after blood transfusion  -continue to monitor

## 2020-01-27 NOTE — CONSULT NOTE ADULT - ASSESSMENT
68 yo  with stage IVB high-grade uterine carcinosarcoma dx 3/2019 presents from Heme/Onc office with symptoms of anemia and H/H 5.9/19.6 now s/p 2uPRBC and 1L NS bolus with improvement in symptoms. No active vaginal bleeding on exam. Anemia possibly related to chemotherapy. CT A/P/C sig for progressive disease with enlarged endometrial and cervical mass and new left para-aortic mass c/w metastatic disease.

## 2020-01-27 NOTE — ED ADULT NURSE REASSESSMENT NOTE - NS ED NURSE REASSESS COMMENT FT1
Patient reassessed - VS as noted, BP low, oral temp 99.8F at present.  MD Edwards advised and brought to bedside to reassess patient.  Will write for tylenol to be given prior to next prbc transfusion.  Patient denies chest pain, shortness of breath, fevers/chills, itching, or any pain at this time.  Awaiting orders, Will monitor.
Patient reassessed - Vitals stable, Afebrile, Respirations even/nonlabored.  complains of generalized weakness, no acute pain or discomfort.  Patient initiated on prbc transfusion per orders, over 1 hour.  Patient provided call bell and instructed on signs & symptoms of transfusion reaction.  Will monitor closely.
Patient received back from CT scan, given food tray and tolerating PO.  Called blood bank regarding release request and per Blood Bank Staff Avis - release would not be available until 2000, change of shift.  Advised hold blood release, she confirmed will be held and can have RN call for release when ready.
Patient vitally stable, afebrile, respirations even/nonlabored on room air, BP stable following PRBC transfusion #1.  Patient in no acute distress.  Will monitor vital signs, advised plan of care, for second PRBC to be transfused shortly.  Patient calm/cooperative and accepting of plan of care.  Will monitor, call brasher within reach.
Patient remains stable and in no distress, speaking in full sentences and breathing comfortably in room air. Patient denies any acute onset of symptoms. Patient no longer febrile, blood bank contacted for one unit of PRBC to be sent. Patient placed on a cardiac monitor with alarms on. Safety maintained.

## 2020-01-27 NOTE — H&P ADULT - HISTORY OF PRESENT ILLNESS
70 YO F with MHx of Stage IV uterine cancer who presents from Oncologists office for hypotesnion. Pt states that she had a regular f/u with the oncologist today, but for the past week she has been having increased vaginal bleeding, now with 3 pads per day, previously was uring about one pad per day with on/off vaginal bleeding since her diagnosis 11 months ago. She denies any light headedness 70 YO F with MHx of HTN and Stage IV uterine  carcinosarcoma (on Carboplatin) who presents from Oncologists office for hypotension. Pt states that she had a regular f/u with the oncologist today, but for the past week she has been having increased vaginal bleeding, now with 3 pads per day and a large clot discharge yesterday, previously was using about one pad per day with on/off vaginal bleeding since her diagnosis 11 months ago. At the office, her BP was 87/56 and complained of some lightheadedness/dizziness, but denies any syncope or falls. Pt states that she has also been constipated for the past 4 days and vomited once- nonbilious and nonbloody emesis, but is still passing flatus, and denies any black or red colored stools, and denies any nausea or vomiting at this time. She denies any light headedness, dizziness, chest pain, palpitations, LOPEZ.     In the ED:BP:'s, HR='s, tmax=98.5F, resp 12, sat 100 % on RA. labs: Hgb=5.9, WBC=12.64, Cr=2.37, K=3.8, CT with Large endometrial and cervical mass which increased in size as compared with the prior study consistent patient's known carcinosarcoma.  Foci of air are noted in the cervix likely related to recent biopsy or areas of necrosis.New left para-aortic mass consistent metastatic disease.  Pt received 2 U PRBC, 1L IVF, and tylenol 650mg PO. 68 YO F with MHx of HTN and Stage IV uterine  carcinosarcoma (on Carboplatin) who presents from Oncologists office for hypotension. Pt states that she had a regular f/u with the oncologist today, but for the past week she has been having increased vaginal bleeding, now with 3 pads per day and a large clot discharge yesterday, previously was using about one pad per day with on/off vaginal bleeding since her diagnosis 11 months ago. At the office, her BP was 87/56 and complained of some lightheadedness/dizziness, but denies any syncope or falls. Pt states that she has also been constipated for the past 4 days and vomited once- nonbilious and nonbloody emesis, but is still passing flatus, and denies any black or red colored stools, and denies any nausea or vomiting at this time. She denies any light headedness, dizziness, chest pain, palpitations, LOPEZ.     In the ED:BP:'s, HR='s, tmax=98.5F, resp 12, sat 100 % on RA. labs: Hgb=5.9, WBC=12.64, Cr=2.37, K=3.8, CT with Large endometrial and cervical mass which increased in size as compared with the prior study consistent patient's known carcinosarcoma.  Foci of air are noted in the cervix likely related to recent biopsy or areas of necrosis.New left para-aortic mass consistent metastatic disease.  Pt received 2 U PRBC, 1L IVF, and tylenol 650mg PO.

## 2020-01-27 NOTE — ED ADULT NURSE NOTE - INTERVENTIONS DEFINITIONS
Non-slip footwear when patient is off stretcher/Physically safe environment: no spills, clutter or unnecessary equipment/Review medications for side effects contributing to fall risk/[place] to call system/Instruct patient to call for assistance/Stretcher in lowest position, wheels locked, appropriate side rails in place/Monitor gait and stability/Reinforce activity limits and safety measures with patient and family

## 2020-01-27 NOTE — ED ADULT NURSE NOTE - NSIMPLEMENTINTERV_GEN_ALL_ED
Implemented All Universal Safety Interventions:  [place] to call system. Call bell, personal items and telephone within reach. Instruct patient to call for assistance. Room bathroom lighting operational. Non-slip footwear when patient is off stretcher. Physically safe environment: no spills, clutter or unnecessary equipment. Stretcher in lowest position, wheels locked, appropriate side rails in place. Specific interventions were implemented:

## 2020-01-27 NOTE — HISTORY OF PRESENT ILLNESS
[Disease: _____________________] : Disease: [unfilled] [AJCC Stage: ____] : AJCC Stage: [unfilled] [de-identified] : 68 y.o female with newly diagnosed metastatic uterine MMT , here with her son to discuss treatment options.\par \par Patient initially presented to the ER with a syncopal episode. As per patient she started with vaginal bleed six months ago. Bleeding was scan and she did not seek medical help until 2/24/19. On that day she saw Dr. Hatch who told patient that she might have fibroids.\par Af ter the doctors visit , she went to a restaurant to eat, and felt dizzy and passed out.\par She was taken to [facility], where she had scans of C/A/P.\par \par Ct angio of A/P 2/24/19: No abdominal aortic aneurysm or dissection. \par \par Heterogeneous endometrium distended to approximately 6.7 cm. The findings \par could represent cervical carcinoma with complex fluid in the endometrial \par canal versus endometrial carcinoma. Extensive pelvic and retroperitoneal \par lymphadenopathy consistent with metastatic disease. \par \par Focal thickening of the gallbladder wall at the fundus may reflect \par adenomyomatosis. Gallbladder ultrasound is recommended to exclude \par suspicious lesion. \par \par CT chest 2/26/19: Left supraclavicular lymphadenopathy worrisome for metastatic \par disease is amenable to ultrasound-guided biopsy. \par \par 2 nonspecific tiny left lower lobe nodules. \par \par Patient was seen by Gyn Onc surgery and had a biopsy of the prolapsing cervical mass. 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 [de-identified] : Patient was asked to make appt with me today as she has been very non compliant with treatments.\par Patient c/o increasing fatigue, generalized weakness and dizzy since Saturday.\par Patient also has vomiting and some constipation'\par She c/o abdominal pain that is on going for two weeks.

## 2020-01-27 NOTE — ED PROVIDER NOTE - OBJECTIVE STATEMENT
68yo female with pmh stage 4 uterine cancer on chemo presenting with vaginal bleeding and hypotension.  She went to oncologist's office for chemo today and was found to be hypotensive to 80s systolic.  Denies loc, lightheadedness, cp, sob, bloody or dark stools, but admits to vaginal bleeding beginning 2 days ago.  She passed a large clot yesterday.  No prior surgeries except hernia repair in 70s.  No fevers.  No recent illnesses, sick contacts.  Oncologist Dr. Sona Garcia ([facility]) 70yo female with pmh stage 4 uterine cancer on chemo presenting with vaginal bleeding and hypotension.  She went to oncologist's office for chemo today and was found to be hypotensive to 80s systolic.  Denies loc, lightheadedness, cp, sob, bloody or dark stools, but admits to vaginal bleeding beginning 2 days ago.  She passed a large clot yesterday.  No prior surgeries except hernia repair in 70s.  No fevers.  No recent illnesses, sick contacts.  States she has been changing pads once/ day except clot yesterday.  Oncologist Dr. Sona Garcia ([facility])

## 2020-01-27 NOTE — H&P ADULT - PROBLEM SELECTOR PLAN 4
-Stage IV uterine cancer on Carboplatin, last dose 12/4.    -s/p C7 with carboplatin/Taxol, now on single agent  -f/u onc recs  -f/u gyn onc recs -pt passing flatus at this time  -start on miralax  -continue to monitor

## 2020-01-27 NOTE — ED PROVIDER NOTE - CLINICAL SUMMARY MEDICAL DECISION MAKING FREE TEXT BOX
68yo female with pmh stage 4 uterine cancer on chemo sent in from oncologist with vaginal bleeding and hypotension. 68yo female with pmh stage 4 uterine cancer on chemo sent in from oncologist with vaginal bleeding and hypotension.  Concern for significant hemorrhage, will check basics, type and screen.  Given hypotension and tachycardia, patient will likely require transfusion.  Will speak with oncologist regarding recommendations.  Will likely admit for symptomatic anemia and poor follow up.

## 2020-01-27 NOTE — CONSULT NOTE ADULT - SUBJECTIVE AND OBJECTIVE BOX
70 yo  with stage IV high-grade uterine carcinosarcoma dx 3/2019 presents from Heme/Onc clinic (Dr. Garcia) for evaluation of lightheadedness/dizziness and hypotension to 87/56 in the office. Patient previously admitted 2019 after a syncopal episode and seen by Gyn Onc inpatient as a consult for evaluation of post-menopausal bleeding [id number] at that time. Patient was noted to have a protruding uterine mass on evaluation which was biopsied and revealed path consistent with high grade uterine carcinosarcoma. CTAP at that time significant for metastatic lesions consistent with Stage IV disease. Patient was referred to [facility]c for chemotherapy and is now s/p 7x cycles of carbo/taxol (2019-2019). Most recently she was started on single agent therapy with Carboplatin since . She went in to see Dr. Garcia today for her scheduled chemotherapy and was found to be hypotensive to 87/56 and c/o lightheadedness/dizziness since this morning. She reports vaginal spotting and having passed a lemon sized clot yesterday with no further bleeding since. She reports vaginal discharge with foul odor for months. She endorses nausea/vomiting 2 days ago and again today at Dr. Garcia's office with 1x episode of NBNB emesis. Last BM 2 days ago. Passing flatus today. She is now s/p 1L NS bolus and 2uPRBC and reports improvement in symptoms. She currently denies further n/v, lightheadedness/dizziness, CP, SOB, palpitations, LE swelling/tenderness, abdominal pain, dysuria, hematuria, constipation, diarrhea.     OBhx - NSVDx1  GYNHx - Stage IV B uterine carcinosarcoma   PMHx - denies  PSHx - denies   Meds - s/p 7x cycles of Carbo/Taxol (2019 - 2019), 1x cycle of single agent therapy w/Carboplatin   Social - smoker, 4 cigarettes/day x 35 yrs, denies alcohol, illicit drugs  FHx - denies significant Fhx of cancers    Physical Exam:   General: sitting comfortably in bed, NAD   Neck: supple, full ROM, no lymphadenopathy  CV: RRR  Lungs: CTA b/l, good air flow b/l   Back: No CVA tenderness  Abd: Soft, palpable solid fullness on RLQ, NTND, no rebound or guarding   Pelvic: no active bleeding on speculum exam   Ext: NT b/l, no edema      Vital Signs Last 24 Hrs  T(C): 37.1 (2020 21:50), Max: 37.7 (2020 19:54)  T(F): 98.7 (2020 21:50), Max: 99.8 (2020 19:54)  HR: 87 (2020 21:50) (87 - 108)  BP: 109/63 (2020 21:50) (89/59 - 119/64)  BP(mean): --  RR: 18 (2020 21:50) (16 - 20)  SpO2: 99% (2020 21:50) (99% - 100%)                            5.9    12.64 )-----------( 324      ( 2020 13:00 )             19.6           137  |  98  |  24<H>  ----------------------------<  135<H>  3.8   |  20<L>  |  2.37<H>    Ca    9.4      2020 13:00      TPro  7.9  /  Alb  3.3  /  TBili  0.3  /  DBili  x   /  AST  15  /  ALT  10  /  AlkPhos  71    LIVER FUNCTIONS - ( 2020 13:00 )  Alb: 3.3 g/dL / Pro: 7.9 g/dL / ALK PHOS: 71 u/L / ALT: 10 u/L / AST: 15 u/L / GGT: x         PT/INR - ( 2020 13:00 )   PT: 15.6 SEC;   INR: 1.39     PTT - ( 2020 13:00 )  PTT:26.7 SEC        EXAM:  CT ABDOMEN AND PELVIS      EXAM:  CT CHEST        PROCEDURE DATE:  2020         INTERPRETATION:  CLINICAL INFORMATION: Uterine carcinosarcoma. Vaginal bleeding and hypotension.    COMPARISON: CT chest, abdomen and pelvis 10/14/2019.    PROCEDURE:   CT of the Chest, Abdomen and Pelvis was performed without intravenous contrast.   Intravenous contrast: None.  Oral contrast: None.  Sagittal and coronal reformats were performed.    FINDINGS:    CHEST:     LUNGS AND LARGE AIRWAYS: Patent central airways. Bibasilar subsegmental atelectasis. Previously described left lower lobe subpleural nodule is not appreciated on this study.  PLEURA: No pleural effusion.  VESSELS: Within normal limits.  HEART: Heart size is normal. Small pericardial effusion, increased from prior.  MEDIASTINUM AND OWEN: No lymphadenopathy.  CHEST WALL AND LOWER NECK: Mediport with tip in SVC. Left supraclavicular lymph node (2:8) measuring 0.7 cm, unchanged.    ABDOMEN AND PELVIS:    LIVER: Within normal limits.  BILE DUCTS: Normal caliber.  GALLBLADDER: Focal thickening at the gallbladder fundus, unchanged and likely related to adenomyomatosis..  SPLEEN: Within normal limits.  PANCREAS: Within normal limits.  ADRENALS: There is new 5.6 x 3.9 cm mass adjacent to the left adrenal gland consistent with metastatic disease. Minimal thickening of the right adrenal gland is unchanged.  KIDNEYS/URETERS: Within normal limits.    BLADDER: Within normal limits.  REPRODUCTIVE ORGANS: Redemonstration is marked distention of the endometrial cavity with tumor measuring up to 11.2 cm in largest axial dimension, previously measuring 10.2 cm. The cervical portion of the mass measures 9.1 cm and previously measured 6.8 cm. Foci of air are noted in the cervix likely related to recent biopsy or areas of necrosis.    BOWEL: No bowel obstruction. Appendix is normal.  PERITONEUM: No ascites.  VESSELS: Atherosclerotic changes.  RETROPERITONEUM/LYMPH NODES:     New large mass adjacent to the left adrenal gland as described above.     Right obturator lymph noted (2:24) measuring 1.4 x 1.0 cm, previously 1.4 x 1.4 cm.    ABDOMINAL WALL: Within normal limits.  BONES: Degenerative changes.    IMPRESSION:     Large endometrial and cervical mass which increased in size as compared with the prior study consistent patient's known carcinosarcoma.  Foci of air are noted in the cervix likely related to recent biopsy or areas of necrosis.    New left para-aortic mass consistent metastatic disease.

## 2020-01-27 NOTE — H&P ADULT - PROBLEM SELECTOR PLAN 6
Transitions of Care Status:  1.  Name of PCP: Dr. Asiya Garcia  2.  PCP Contacted on Admission: [ ] Y    [x ] N    3.  PCP contacted at Discharge: [ ] Y    [ ] N    [ ] N/A  4.  Post-Discharge Appointment Date and Location:  5.  Summary of Handoff given to PCP: -Hold home losartan as BP soft and STEVEN

## 2020-01-27 NOTE — ED ADULT NURSE NOTE - OBJECTIVE STATEMENT
pt c/o vaginal bleeding with n/v x 3 days.  pt is currently on chemo for uterine CA.  Patient to room 12. Pt is alert and oriented times four and is comfortable. She is not c/o pain and has been evaluated by MD. IV accessed to right chest wall port. Pt's labs drawn and sent. Pt received results and is need of and in agreement for blood transfusion. VS taken and recorded. Waiting for blood transfusion and further orders and disposition.   DANNIELLE Shi

## 2020-01-27 NOTE — PHYSICAL EXAM
[Capable of only limited self care, confined to bed or chair more than 50% of waking hours] : Status 3- Capable of only limited self care, confined to bed or chair more than 50% of waking hours [Cachectic] : cachectic

## 2020-01-27 NOTE — H&P ADULT - ASSESSMENT
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

## 2020-01-27 NOTE — H&P ADULT - NSHPPHYSICALEXAM_GEN_ALL_CORE
.  VITAL SIGNS:  T(C): 36.6 (01-27-20 @ 23:05), Max: 37.7 (01-27-20 @ 19:54)  T(F): 97.8 (01-27-20 @ 23:05), Max: 99.8 (01-27-20 @ 19:54)  HR: 79 (01-27-20 @ 23:05) (79 - 108)  BP: 90/69 (01-27-20 @ 23:05) (89/59 - 119/64)  BP(mean): --  RR: 20 (01-27-20 @ 23:05) (16 - 20)  SpO2: 100% (01-27-20 @ 23:05) (97% - 100%)  Wt(kg): --    PHYSICAL EXAM:    Constitutional: WDWN resting comfortably in bed; NAD  Head: NC/AT  Eyes: PERRL, EOMI, anicteric sclera  ENT: no nasal discharge, MMM  Neck: supple; no JVD appreciated  Respiratory: CTA B/L; no W/R/R, no increased work of breathing  Cardiac: +S1/S2; RRR; no M/R/G  Gastrointestinal: soft, NT/ND; no rebound or guarding; +BSx4  Extremities: Trace RLE edema WWP, no cyanosis;   Musculoskeletal: NROM x4; no joint swelling, tenderness or erythema  Vascular: 2+ radial, DP pulses B/L  Dermatologic: skin warm, dry and intact  Neurologic: Alert and oriented, no focal deficits appreciated  Psychiatric: affect and characteristics of appearance, verbalizations, behaviors are appropriate .  VITAL SIGNS:  T(C): 36.6 (01-27-20 @ 23:05), Max: 37.7 (01-27-20 @ 19:54)  T(F): 97.8 (01-27-20 @ 23:05), Max: 99.8 (01-27-20 @ 19:54)  HR: 79 (01-27-20 @ 23:05) (79 - 108)  BP: 90/69 (01-27-20 @ 23:05) (89/59 - 119/64)  BP(mean): --  RR: 20 (01-27-20 @ 23:05) (16 - 20)  SpO2: 100% (01-27-20 @ 23:05) (97% - 100%)  Wt(kg): --    Constitutional: NAD, well-developed, well-nourished  Ears, Nose, Mouth, and Throat: normal external ears and nose, normal hearing, moist oral mucosa  Eyes: normal conjunctiva, EOMI, PERRL  Neck: supple, no JVD  Respiratory: Clear to auscultation bilaterally. No wheezes, rales or rhonchi. Normal respiratory effort  Cardiovascular: RRR, no M/R/G, no edema, 2+ Peripheral Pulses  Gastrointestinal: soft, nontender, nondistended, +BS, no hernia  Skin: warm, dry, no rash  Neurologic: sensation grossly intact, CN grossly intact, non-focal exam  Musculoskeletal: no clubbing, no cyanosis, no joint swelling  Psychiatric: AOX3, appropriate mood, affect

## 2020-01-28 ENCOUNTER — APPOINTMENT (OUTPATIENT)
Dept: INFUSION THERAPY | Facility: HOSPITAL | Age: 70
End: 2020-01-28

## 2020-01-28 LAB
ALBUMIN SERPL ELPH-MCNC: 2.9 G/DL — LOW (ref 3.3–5)
ALP SERPL-CCNC: 73 U/L — SIGNIFICANT CHANGE UP (ref 40–120)
ALT FLD-CCNC: 10 U/L — SIGNIFICANT CHANGE UP (ref 4–33)
ANION GAP SERPL CALC-SCNC: 16 MMO/L — HIGH (ref 7–14)
AST SERPL-CCNC: 19 U/L — SIGNIFICANT CHANGE UP (ref 4–32)
BASOPHILS # BLD AUTO: 0.02 K/UL — SIGNIFICANT CHANGE UP (ref 0–0.2)
BASOPHILS NFR BLD AUTO: 0.2 % — SIGNIFICANT CHANGE UP (ref 0–2)
BILIRUB SERPL-MCNC: 0.4 MG/DL — SIGNIFICANT CHANGE UP (ref 0.2–1.2)
BUN SERPL-MCNC: 27 MG/DL — HIGH (ref 7–23)
CALCIUM SERPL-MCNC: 9 MG/DL — SIGNIFICANT CHANGE UP (ref 8.4–10.5)
CHLORIDE SERPL-SCNC: 102 MMOL/L — SIGNIFICANT CHANGE UP (ref 98–107)
CO2 SERPL-SCNC: 19 MMOL/L — LOW (ref 22–31)
CREAT SERPL-MCNC: 2 MG/DL — HIGH (ref 0.5–1.3)
EOSINOPHIL # BLD AUTO: 0.03 K/UL — SIGNIFICANT CHANGE UP (ref 0–0.5)
EOSINOPHIL NFR BLD AUTO: 0.3 % — SIGNIFICANT CHANGE UP (ref 0–6)
GLUCOSE SERPL-MCNC: 101 MG/DL — HIGH (ref 70–99)
HCT VFR BLD CALC: 25.3 % — LOW (ref 34.5–45)
HCT VFR BLD CALC: 25.5 % — LOW (ref 34.5–45)
HCT VFR BLD CALC: 26.2 % — LOW (ref 34.5–45)
HCT VFR BLD CALC: 26.4 % — LOW (ref 34.5–45)
HGB BLD-MCNC: 8.2 G/DL — LOW (ref 11.5–15.5)
HGB BLD-MCNC: 8.3 G/DL — LOW (ref 11.5–15.5)
IMM GRANULOCYTES NFR BLD AUTO: 1.1 % — SIGNIFICANT CHANGE UP (ref 0–1.5)
LYMPHOCYTES # BLD AUTO: 1.56 K/UL — SIGNIFICANT CHANGE UP (ref 1–3.3)
LYMPHOCYTES # BLD AUTO: 14.9 % — SIGNIFICANT CHANGE UP (ref 13–44)
MAGNESIUM SERPL-MCNC: 1.3 MG/DL — LOW (ref 1.6–2.6)
MCHC RBC-ENTMCNC: 28.8 PG — SIGNIFICANT CHANGE UP (ref 27–34)
MCHC RBC-ENTMCNC: 29 PG — SIGNIFICANT CHANGE UP (ref 27–34)
MCHC RBC-ENTMCNC: 29.2 PG — SIGNIFICANT CHANGE UP (ref 27–34)
MCHC RBC-ENTMCNC: 29.3 PG — SIGNIFICANT CHANGE UP (ref 27–34)
MCHC RBC-ENTMCNC: 31.4 % — LOW (ref 32–36)
MCHC RBC-ENTMCNC: 31.7 % — LOW (ref 32–36)
MCHC RBC-ENTMCNC: 32.4 % — SIGNIFICANT CHANGE UP (ref 32–36)
MCHC RBC-ENTMCNC: 32.5 % — SIGNIFICANT CHANGE UP (ref 32–36)
MCV RBC AUTO: 90 FL — SIGNIFICANT CHANGE UP (ref 80–100)
MCV RBC AUTO: 90.1 FL — SIGNIFICANT CHANGE UP (ref 80–100)
MCV RBC AUTO: 91.6 FL — SIGNIFICANT CHANGE UP (ref 80–100)
MCV RBC AUTO: 91.7 FL — SIGNIFICANT CHANGE UP (ref 80–100)
MONOCYTES # BLD AUTO: 1.09 K/UL — HIGH (ref 0–0.9)
MONOCYTES NFR BLD AUTO: 10.4 % — SIGNIFICANT CHANGE UP (ref 2–14)
NEUTROPHILS # BLD AUTO: 7.63 K/UL — HIGH (ref 1.8–7.4)
NEUTROPHILS NFR BLD AUTO: 73.1 % — SIGNIFICANT CHANGE UP (ref 43–77)
NRBC # FLD: 0 K/UL — SIGNIFICANT CHANGE UP (ref 0–0)
PHOSPHATE SERPL-MCNC: 2.8 MG/DL — SIGNIFICANT CHANGE UP (ref 2.5–4.5)
PLATELET # BLD AUTO: 228 K/UL — SIGNIFICANT CHANGE UP (ref 150–400)
PLATELET # BLD AUTO: 241 K/UL — SIGNIFICANT CHANGE UP (ref 150–400)
PLATELET # BLD AUTO: 243 K/UL — SIGNIFICANT CHANGE UP (ref 150–400)
PLATELET # BLD AUTO: 266 K/UL — SIGNIFICANT CHANGE UP (ref 150–400)
PMV BLD: 9.5 FL — SIGNIFICANT CHANGE UP (ref 7–13)
PMV BLD: 9.6 FL — SIGNIFICANT CHANGE UP (ref 7–13)
PMV BLD: 9.7 FL — SIGNIFICANT CHANGE UP (ref 7–13)
PMV BLD: 9.8 FL — SIGNIFICANT CHANGE UP (ref 7–13)
POTASSIUM SERPL-MCNC: 3.5 MMOL/L — SIGNIFICANT CHANGE UP (ref 3.5–5.3)
POTASSIUM SERPL-SCNC: 3.5 MMOL/L — SIGNIFICANT CHANGE UP (ref 3.5–5.3)
PROT SERPL-MCNC: 7.4 G/DL — SIGNIFICANT CHANGE UP (ref 6–8.3)
RBC # BLD: 2.81 M/UL — LOW (ref 3.8–5.2)
RBC # BLD: 2.83 M/UL — LOW (ref 3.8–5.2)
RBC # BLD: 2.86 M/UL — LOW (ref 3.8–5.2)
RBC # BLD: 2.88 M/UL — LOW (ref 3.8–5.2)
RBC # FLD: 19.1 % — HIGH (ref 10.3–14.5)
RBC # FLD: 19.9 % — HIGH (ref 10.3–14.5)
RBC # FLD: 20.2 % — HIGH (ref 10.3–14.5)
RBC # FLD: 20.3 % — HIGH (ref 10.3–14.5)
SODIUM SERPL-SCNC: 137 MMOL/L — SIGNIFICANT CHANGE UP (ref 135–145)
WBC # BLD: 10.11 K/UL — SIGNIFICANT CHANGE UP (ref 3.8–10.5)
WBC # BLD: 10.45 K/UL — SIGNIFICANT CHANGE UP (ref 3.8–10.5)
WBC # BLD: 8.47 K/UL — SIGNIFICANT CHANGE UP (ref 3.8–10.5)
WBC # BLD: 9.28 K/UL — SIGNIFICANT CHANGE UP (ref 3.8–10.5)
WBC # FLD AUTO: 10.11 K/UL — SIGNIFICANT CHANGE UP (ref 3.8–10.5)
WBC # FLD AUTO: 10.45 K/UL — SIGNIFICANT CHANGE UP (ref 3.8–10.5)
WBC # FLD AUTO: 8.47 K/UL — SIGNIFICANT CHANGE UP (ref 3.8–10.5)
WBC # FLD AUTO: 9.28 K/UL — SIGNIFICANT CHANGE UP (ref 3.8–10.5)

## 2020-01-28 PROCEDURE — 99233 SBSQ HOSP IP/OBS HIGH 50: CPT

## 2020-01-28 RX ORDER — GABAPENTIN 400 MG/1
300 CAPSULE ORAL
Refills: 0 | Status: DISCONTINUED | OUTPATIENT
Start: 2020-01-28 | End: 2020-02-20

## 2020-01-28 RX ORDER — CHLORHEXIDINE GLUCONATE 213 G/1000ML
1 SOLUTION TOPICAL DAILY
Refills: 0 | Status: DISCONTINUED | OUTPATIENT
Start: 2020-01-28 | End: 2020-02-20

## 2020-01-28 RX ORDER — ACETAMINOPHEN 500 MG
650 TABLET ORAL ONCE
Refills: 0 | Status: COMPLETED | OUTPATIENT
Start: 2020-01-28 | End: 2020-01-28

## 2020-01-28 RX ORDER — MAGNESIUM SULFATE 500 MG/ML
1 VIAL (ML) INJECTION ONCE
Refills: 0 | Status: COMPLETED | OUTPATIENT
Start: 2020-01-28 | End: 2020-01-28

## 2020-01-28 RX ADMIN — Medication 650 MILLIGRAM(S): at 11:41

## 2020-01-28 RX ADMIN — CHLORHEXIDINE GLUCONATE 1 APPLICATION(S): 213 SOLUTION TOPICAL at 11:11

## 2020-01-28 RX ADMIN — GABAPENTIN 300 MILLIGRAM(S): 400 CAPSULE ORAL at 06:38

## 2020-01-28 RX ADMIN — Medication 650 MILLIGRAM(S): at 11:11

## 2020-01-28 RX ADMIN — SODIUM CHLORIDE 100 MILLILITER(S): 9 INJECTION INTRAMUSCULAR; INTRAVENOUS; SUBCUTANEOUS at 02:00

## 2020-01-28 RX ADMIN — Medication 100 GRAM(S): at 11:02

## 2020-01-28 RX ADMIN — POLYETHYLENE GLYCOL 3350 17 GRAM(S): 17 POWDER, FOR SOLUTION ORAL at 11:11

## 2020-01-28 RX ADMIN — GABAPENTIN 300 MILLIGRAM(S): 400 CAPSULE ORAL at 17:37

## 2020-01-28 NOTE — PROGRESS NOTE ADULT - ATTENDING COMMENTS
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      Problem/Plan - 1:  ·  Problem: Anemia due to blood loss.  Plan: -likely 2/2 vaginal bleed in the setting of uterine cancer.   -hgb=5.9, baseline 8-10.   -receiving the second Unit PRBC now  -post transfusion CBC  -maintain active t/s  -Transfuse to keep >7  -f/u GYN recs  -May need rad-onc if bleeding does not side -Transfuse to keep >7  -f/u GYN recs  -May need rad-onc if bleeding does not side  monitor cbc in am

## 2020-01-28 NOTE — PROGRESS NOTE ADULT - ASSESSMENT
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   Hgb 8.3 today s/p PRBC 68 YO F with MHx of HTN and Stage IV uterine  carcinosarcoma (on Carboplatin) who presents from Oncologists office for hypotension found to have vaginal bleeding with hgb=5.9. Anemia due to blood loss. -likely 2/2 vaginal bleed in the setting of uterine cancer.    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   Hgb 8.3 today s/p PRBC

## 2020-01-28 NOTE — CONSULT NOTE ADULT - SUBJECTIVE AND OBJECTIVE BOX
HPI:  68 yo  with stage IV high-grade uterine carcinosarcoma dx 3/2019, s/p carbo/taxol 7 cycles followed by single agent carboplatin last dose 2019 presenting for hypotension from [facility].    Patient initially admitted, found to have protruding uterine mass consistent with high grade uterine carcinosarcoma. CTAP at that time significant for metastatic lesions consistent with Stage IV disease.     She was found to be hypotensive to 87/56 at [facility] and c/o lightheadedness/dizziness since this morning. She reports vaginal spotting and having passed a lemon sized clot yesterday with no further bleeding since. She reports vaginal discharge with foul odor for months. She endorses nausea/vomiting 2 days ago and again today at Dr. Garcia's office with 1x episode of NBNB emesis. Last BM 2 days ago. Passing flatus today.     In the ED:BP:'s, HR='s, tmax=98.5F, resp 12, sat 100 % on RA. labs: Hgb=5.9, WBC=12.64, Cr=2.37, K=3.8, CT with Large endometrial and cervical mass which increased in size as compared with the prior study consistent patient's known carcinosarcoma.  Foci of air are noted in the cervix likely related to recent biopsy or areas of necrosis. New left para-aortic mass consistent metastatic disease.  Pt received 2 U PRBC, 1L IVF, and tylenol 650mg PO.       PAST MEDICAL & SURGICAL HISTORY:  Malignant neoplasm of uterus, unspecified site  History of cervical biopsy    MEDICATIONS  (STANDING):  chlorhexidine 4% Liquid 1 Application(s) Topical daily  gabapentin 300 milliGRAM(s) Oral two times a day  magnesium sulfate  IVPB 1 Gram(s) IV Intermittent once  polyethylene glycol 3350 17 Gram(s) Oral daily  sodium chloride 0.9%. 1000 milliLiter(s) (100 mL/Hr) IV Continuous <Continuous>    MEDICATIONS  (PRN):  ondansetron Injectable 4 milliGRAM(s) IV Push every 8 hours PRN Nausea and/or Vomiting      Allergies    No Known Allergies    Intolerances        SOCIAL HISTORY:    FAMILY HISTORY:  No pertinent family history in first degree relatives      Vital Signs Last 24 Hrs  T(C): 36.9 (2020 06:29), Max: 37.7 (2020 19:54)  T(F): 98.5 (2020 06:29), Max: 99.8 (2020 19:54)  HR: 91 (2020 06:29) (76 - 108)  BP: 108/56 (2020 06:29) (89/59 - 119/64)  BP(mean): --  RR: 18 (2020 06:29) (16 - 189)  SpO2: 98% (2020 06:29) (97% - 100%)    PHYSICAL EXAM:    GENERAL: NAD, AAOx3   HEAD:  NC/AT  EYES: EOMI, PERRLA, no scleral icterus  HEENT: Moist mucous membranes  LUNG: Clear to auscultation bilaterally; No rales, rhonchi, wheezing, or rubs  HEART: RRR; No murmurs, rubs, or gallops  ABDOMEN: +BS, ST/ND/NT  EXTREMITIES:  2+ Peripheral Pulses, No clubbing, cyanosis, or edema  LAD: no palpable adenopathy    LABS:                        8.3    10.45 )-----------( 241      ( 2020 07:00 )             25.5     28    137  |  102  |  27<H>  ----------------------------<  101<H>  3.5   |  19<L>  |  2.00<H>    Ca    9.0      2020 01:05  Phos  2.8       Mg     1.3         TPro  7.4  /  Alb  2.9<L>  /  TBili  0.4  /  DBili  x   /  AST  19  /  ALT  10  /  AlkPhos  73      PT/INR - ( 2020 13:00 )   PT: 15.6 SEC;   INR: 1.39          PTT - ( 2020 13:00 )  PTT:26.7 SEC          RADIOLOGY & ADDITIONAL STUDIES:

## 2020-01-28 NOTE — PROGRESS NOTE ADULT - SUBJECTIVE AND OBJECTIVE BOX
R3 GYN ONC PROGRESS NOTE    HD#2    SUBJECTIVE:  Patient seen and examined at bedside. No acute complaints, pain well controlled.  She reports last pad change at 1AM and only small VB since.    Patient is ambulating and tolerating regular diet. Denies CP, SOB, N/V, fevers, and chills.    OBJECTIVE:  Vital Signs Last 24 Hours  T(C): 36.9 (01-28-20 @ 06:29), Max: 37.7 (01-27-20 @ 19:54)  HR: 91 (01-28-20 @ 06:29) (76 - 108)  BP: 108/56 (01-28-20 @ 06:29) (89/59 - 119/64)  RR: 18 (01-28-20 @ 06:29) (16 - 189)  SpO2: 98% (01-28-20 @ 06:29) (97% - 100%)    Physical Exam:  General: NAD  CV: RRR  Lungs: CTAB  Abdomen: Soft, non-tender, non-distended, +BS  : malodorous, pad 1/2 soaked with dark red blood  Ext: wwp, nt    Labs:                        8.3    10.11 )-----------( 266      ( 28 Jan 2020 01:05 )             26.2                            5.9    12.64 )-----------( 324      ( 27 Jan 2020 13:00 )             19.6   baso 0.2    eos 0.0    imm gran 1.7    lymph 12.5   mono 8.5    poly 77.1     01-28    137  |  102  |  27<H>  ----------------------------<  101<H>  3.5   |  19<L>  |  2.00<H>    Ca    9.0      28 Jan 2020 01:05  Phos  2.8     01-28  Mg     1.3     01-28    TPro  7.4  /  Alb  2.9<L>  /  TBili  0.4  /  DBili  x   /  AST  19  /  ALT  10  /  AlkPhos  73  01-28    PT/INR - ( 27 Jan 2020 13:00 )   PT: 15.6 SEC;   INR: 1.39          PTT - ( 27 Jan 2020 13:00 )  PTT:26.7 SEC      Blood Type: O Positive

## 2020-01-28 NOTE — PROGRESS NOTE ADULT - SUBJECTIVE AND OBJECTIVE BOX
Patient is a 69y old  Female who presents with a chief complaint of Sent from Onc office for hypotension (28 Jan 2020 10:42)      SUBJECTIVE / OVERNIGHT EVENTS:  had prbc- f/u cbc    MEDICATIONS  (STANDING):  chlorhexidine 4% Liquid 1 Application(s) Topical daily  gabapentin 300 milliGRAM(s) Oral two times a day  polyethylene glycol 3350 17 Gram(s) Oral daily  sodium chloride 0.9%. 1000 milliLiter(s) (100 mL/Hr) IV Continuous <Continuous>    MEDICATIONS  (PRN):  ondansetron Injectable 4 milliGRAM(s) IV Push every 8 hours PRN Nausea and/or Vomiting      Vital Signs Last 24 Hrs  T(C): 37 (28 Jan 2020 13:12), Max: 37.7 (27 Jan 2020 19:54)  T(F): 98.6 (28 Jan 2020 13:12), Max: 99.8 (27 Jan 2020 19:54)  HR: 81 (28 Jan 2020 13:12) (76 - 98)  BP: 100/65 (28 Jan 2020 13:12) (89/59 - 119/61)  BP(mean): --  RR: 18 (28 Jan 2020 13:12) (16 - 189)  SpO2: 100% (28 Jan 2020 13:12) (97% - 100%)      PHYSICAL EXAM:  GENERAL: NAD, well-developed  HEAD:  Atraumatic, Normocephalic  EYES: EOMI, PERRLA, conjunctiva and sclera clear  NECK: Supple, No JVD  CHEST/LUNG: Clear to auscultation bilaterally; No wheeze  HEART: Regular rate and rhythm; No murmurs, rubs, or gallops  ABDOMEN: Soft, Nontender, Nondistended; Bowel sounds present  EXTREMITIES:  2+ Peripheral Pulses, No clubbing, cyanosis, or edema  PSYCH: AAOx3  NEUROLOGY: non-focal  SKIN: No rashes or lesions    LABS:                        8.2    8.47  )-----------( 228      ( 28 Jan 2020 13:57 )             25.3     01-28    137  |  102  |  27<H>  ----------------------------<  101<H>  3.5   |  19<L>  |  2.00<H>    Ca    9.0      28 Jan 2020 01:05  Phos  2.8     01-28  Mg     1.3     01-28    TPro  7.4  /  Alb  2.9<L>  /  TBili  0.4  /  DBili  x   /  AST  19  /  ALT  10  /  AlkPhos  73  01-28    PT/INR - ( 27 Jan 2020 13:00 )   PT: 15.6 SEC;   INR: 1.39          PTT - ( 27 Jan 2020 13:00 )  PTT:26.7 SEC          RADIOLOGY & ADDITIONAL TESTS:    Imaging Personally Reviewed:    Consultant(s) Notes Reviewed:      Care Discussed with Consultants/Other Providers:

## 2020-01-28 NOTE — CONSULT NOTE ADULT - ATTENDING COMMENTS
I have reviewed the case in detail with Dr. Morillo, the oncology consult fellow and agree with the assessment and plan of care as outlined in the note.  68 y/o F, patient of Dr. Garcia with metastatic uterine carcinosarcoma currently on carboplatin (last dose 12/4/19) p/w hypotension and anemia, 2/2 vaginal bleed. Scans reviewed and tumor mass likely source of bleeding, +disease progression. F/u gyn onc recommendations. If bleeding continues can consider palliative RT evaluation. Will continue to follow with you.
Patient seen and evaluated, bleeding decreased, however she had persistent bleeding with final hg 5  Palliative chemo continuing, however, if bleeding returns, would recommend considering palliative RT to the pelvis, given large pelvic mass that is likely bleeding. Patient has never had RT in the past  Recommend rad onc consult

## 2020-01-28 NOTE — PROGRESS NOTE ADULT - ASSESSMENT
69y F w/ stage IVB uterine carcinosarcoma s/p 7 cycles carbo/taxol (4/2019-9/2019), admitted w/ hypotension and symptomatic anemia in setting of chronic vaginal bleeding.  H/H with appropriate rise s/p 2u PRBC.

## 2020-01-28 NOTE — CONSULT NOTE ADULT - ASSESSMENT
70 yo  with stage IV high-grade uterine carcinosarcoma dx 3/2019, s/p carbo/taxol 7 cycles followed by single agent carboplatin last dose 2019 presenting for hypotension from group home.    Stage IV uterine carcinosarcoma  - CT C/A/P WO showing increased size of endometrial and cervical mass along with a new left para-aortic mass consistent with metastatic disease  - bleeding coming from mass, no GYN intervention at this time.  If continues to bleed, recommended vaginal packing and consult RT  - no plans for inpatient chemotherapy  - outpatient f/u with Dr. Jose Morillo,   Hematology/Oncology Fellow, PGY5  Pager: [phone] 70 yo  with stage IV high-grade uterine carcinosarcoma dx 3/2019, s/p carbo/taxol 7 cycles followed by single agent carboplatin last dose 2019 presenting for hypotension from [facility].    Stage IV uterine carcinosarcoma  - CT C/A/P WO showing increased size of endometrial and cervical mass along with a new left para-aortic mass consistent with metastatic disease  - bleeding coming from mass, no GYN intervention at this time.  If continues to bleed, recommended vaginal packing and consult RT  - no plans for inpatient chemotherapy  - bowel regimen  - outpatient f/u with Dr. Jose Morillo DO  Hematology/Oncology Fellow, PGY5  Pager: [phone]

## 2020-01-29 LAB
ALBUMIN SERPL ELPH-MCNC: 2.6 G/DL — LOW (ref 3.3–5)
ALP SERPL-CCNC: 83 U/L — SIGNIFICANT CHANGE UP (ref 40–120)
ALT FLD-CCNC: 14 U/L — SIGNIFICANT CHANGE UP (ref 4–33)
ANION GAP SERPL CALC-SCNC: 14 MMO/L — SIGNIFICANT CHANGE UP (ref 7–14)
AST SERPL-CCNC: 30 U/L — SIGNIFICANT CHANGE UP (ref 4–32)
BASOPHILS # BLD AUTO: 0.01 K/UL — SIGNIFICANT CHANGE UP (ref 0–0.2)
BASOPHILS NFR BLD AUTO: 0.1 % — SIGNIFICANT CHANGE UP (ref 0–2)
BILIRUB SERPL-MCNC: 0.3 MG/DL — SIGNIFICANT CHANGE UP (ref 0.2–1.2)
BUN SERPL-MCNC: 20 MG/DL — SIGNIFICANT CHANGE UP (ref 7–23)
CALCIUM SERPL-MCNC: 8.7 MG/DL — SIGNIFICANT CHANGE UP (ref 8.4–10.5)
CHLORIDE SERPL-SCNC: 105 MMOL/L — SIGNIFICANT CHANGE UP (ref 98–107)
CO2 SERPL-SCNC: 20 MMOL/L — LOW (ref 22–31)
CREAT SERPL-MCNC: 1.45 MG/DL — HIGH (ref 0.5–1.3)
EOSINOPHIL # BLD AUTO: 0.02 K/UL — SIGNIFICANT CHANGE UP (ref 0–0.5)
EOSINOPHIL NFR BLD AUTO: 0.2 % — SIGNIFICANT CHANGE UP (ref 0–6)
GLUCOSE SERPL-MCNC: 98 MG/DL — SIGNIFICANT CHANGE UP (ref 70–99)
HCT VFR BLD CALC: 23.5 % — LOW (ref 34.5–45)
HCT VFR BLD CALC: 24.1 % — LOW (ref 34.5–45)
HCT VFR BLD CALC: 24.3 % — LOW (ref 34.5–45)
HCT VFR BLD CALC: 28.6 % — LOW (ref 34.5–45)
HGB BLD-MCNC: 7.4 G/DL — LOW (ref 11.5–15.5)
HGB BLD-MCNC: 7.6 G/DL — LOW (ref 11.5–15.5)
HGB BLD-MCNC: 7.7 G/DL — LOW (ref 11.5–15.5)
HGB BLD-MCNC: 9.1 G/DL — LOW (ref 11.5–15.5)
IMM GRANULOCYTES NFR BLD AUTO: 0.8 % — SIGNIFICANT CHANGE UP (ref 0–1.5)
LYMPHOCYTES # BLD AUTO: 1.77 K/UL — SIGNIFICANT CHANGE UP (ref 1–3.3)
LYMPHOCYTES # BLD AUTO: 21.1 % — SIGNIFICANT CHANGE UP (ref 13–44)
MCHC RBC-ENTMCNC: 28.8 PG — SIGNIFICANT CHANGE UP (ref 27–34)
MCHC RBC-ENTMCNC: 28.8 PG — SIGNIFICANT CHANGE UP (ref 27–34)
MCHC RBC-ENTMCNC: 28.9 PG — SIGNIFICANT CHANGE UP (ref 27–34)
MCHC RBC-ENTMCNC: 29.1 PG — SIGNIFICANT CHANGE UP (ref 27–34)
MCHC RBC-ENTMCNC: 31.5 % — LOW (ref 32–36)
MCHC RBC-ENTMCNC: 31.5 % — LOW (ref 32–36)
MCHC RBC-ENTMCNC: 31.7 % — LOW (ref 32–36)
MCHC RBC-ENTMCNC: 31.8 % — LOW (ref 32–36)
MCV RBC AUTO: 91 FL — SIGNIFICANT CHANGE UP (ref 80–100)
MCV RBC AUTO: 91.4 FL — SIGNIFICANT CHANGE UP (ref 80–100)
MCV RBC AUTO: 91.4 FL — SIGNIFICANT CHANGE UP (ref 80–100)
MCV RBC AUTO: 91.6 FL — SIGNIFICANT CHANGE UP (ref 80–100)
MONOCYTES # BLD AUTO: 0.99 K/UL — HIGH (ref 0–0.9)
MONOCYTES NFR BLD AUTO: 11.8 % — SIGNIFICANT CHANGE UP (ref 2–14)
NEUTROPHILS # BLD AUTO: 5.52 K/UL — SIGNIFICANT CHANGE UP (ref 1.8–7.4)
NEUTROPHILS NFR BLD AUTO: 66 % — SIGNIFICANT CHANGE UP (ref 43–77)
NRBC # FLD: 0 K/UL — SIGNIFICANT CHANGE UP (ref 0–0)
PLATELET # BLD AUTO: 225 K/UL — SIGNIFICANT CHANGE UP (ref 150–400)
PLATELET # BLD AUTO: 225 K/UL — SIGNIFICANT CHANGE UP (ref 150–400)
PLATELET # BLD AUTO: 229 K/UL — SIGNIFICANT CHANGE UP (ref 150–400)
PLATELET # BLD AUTO: 230 K/UL — SIGNIFICANT CHANGE UP (ref 150–400)
PMV BLD: 9.3 FL — SIGNIFICANT CHANGE UP (ref 7–13)
PMV BLD: 9.5 FL — SIGNIFICANT CHANGE UP (ref 7–13)
PMV BLD: 9.8 FL — SIGNIFICANT CHANGE UP (ref 7–13)
PMV BLD: 9.9 FL — SIGNIFICANT CHANGE UP (ref 7–13)
POTASSIUM SERPL-MCNC: 3.7 MMOL/L — SIGNIFICANT CHANGE UP (ref 3.5–5.3)
POTASSIUM SERPL-SCNC: 3.7 MMOL/L — SIGNIFICANT CHANGE UP (ref 3.5–5.3)
PROT SERPL-MCNC: 6.5 G/DL — SIGNIFICANT CHANGE UP (ref 6–8.3)
RBC # BLD: 2.57 M/UL — LOW (ref 3.8–5.2)
RBC # BLD: 2.63 M/UL — LOW (ref 3.8–5.2)
RBC # BLD: 2.67 M/UL — LOW (ref 3.8–5.2)
RBC # BLD: 3.13 M/UL — LOW (ref 3.8–5.2)
RBC # FLD: 19.5 % — HIGH (ref 10.3–14.5)
RBC # FLD: 19.9 % — HIGH (ref 10.3–14.5)
RBC # FLD: 20 % — HIGH (ref 10.3–14.5)
RBC # FLD: 20.3 % — HIGH (ref 10.3–14.5)
SODIUM SERPL-SCNC: 139 MMOL/L — SIGNIFICANT CHANGE UP (ref 135–145)
WBC # BLD: 8.21 K/UL — SIGNIFICANT CHANGE UP (ref 3.8–10.5)
WBC # BLD: 8.38 K/UL — SIGNIFICANT CHANGE UP (ref 3.8–10.5)
WBC # BLD: 9 K/UL — SIGNIFICANT CHANGE UP (ref 3.8–10.5)
WBC # BLD: 9.18 K/UL — SIGNIFICANT CHANGE UP (ref 3.8–10.5)
WBC # FLD AUTO: 8.21 K/UL — SIGNIFICANT CHANGE UP (ref 3.8–10.5)
WBC # FLD AUTO: 8.38 K/UL — SIGNIFICANT CHANGE UP (ref 3.8–10.5)
WBC # FLD AUTO: 9 K/UL — SIGNIFICANT CHANGE UP (ref 3.8–10.5)
WBC # FLD AUTO: 9.18 K/UL — SIGNIFICANT CHANGE UP (ref 3.8–10.5)

## 2020-01-29 PROCEDURE — 99221 1ST HOSP IP/OBS SF/LOW 40: CPT | Mod: 25

## 2020-01-29 PROCEDURE — 99223 1ST HOSP IP/OBS HIGH 75: CPT

## 2020-01-29 PROCEDURE — 77262 THER RADIOLOGY TX PLNG INTRM: CPT

## 2020-01-29 PROCEDURE — 99233 SBSQ HOSP IP/OBS HIGH 50: CPT

## 2020-01-29 RX ORDER — ACETAMINOPHEN 500 MG
650 TABLET ORAL ONCE
Refills: 0 | Status: COMPLETED | OUTPATIENT
Start: 2020-01-29 | End: 2020-01-29

## 2020-01-29 RX ADMIN — GABAPENTIN 300 MILLIGRAM(S): 400 CAPSULE ORAL at 06:58

## 2020-01-29 RX ADMIN — CHLORHEXIDINE GLUCONATE 1 APPLICATION(S): 213 SOLUTION TOPICAL at 13:11

## 2020-01-29 RX ADMIN — POLYETHYLENE GLYCOL 3350 17 GRAM(S): 17 POWDER, FOR SOLUTION ORAL at 13:11

## 2020-01-29 RX ADMIN — Medication 650 MILLIGRAM(S): at 23:17

## 2020-01-29 RX ADMIN — GABAPENTIN 300 MILLIGRAM(S): 400 CAPSULE ORAL at 18:04

## 2020-01-29 NOTE — CONSULT NOTE ADULT - PROBLEM SELECTOR RECOMMENDATION 4
- Patient with advanced endometrial cancer.  - Prognosis is guarded.  - Supportive and symptom care.  - Not ready to withdraw care or discuss code status.

## 2020-01-29 NOTE — PROGRESS NOTE ADULT - ATTENDING COMMENTS
Rt- onc called for consult - Hgb 7.6 today Rt- onc called for consult - Hgb 7.6 today  will ask ir if there is any role for ablation of artery leading to bleeding mass- Onc rec greatly appreciated

## 2020-01-29 NOTE — CONSULT NOTE ADULT - SUBJECTIVE AND OBJECTIVE BOX
HPI:  70 YO F wit h/o Stage IV uterine  carcinosarcoma (on Carboplatin) s/p 7 cycles of chemotherapy the last December of 2019 who presents from Oncologists office for hypotension.  She has an intermittent history of vaginal bleeding for many months and was found to be anemic with a hemoglobin of 5.9 on 1/27/20.     Her oncologist is Dr. Sona Garcia.   Pt states that she had a regular f/u with the oncologist today, but for the past week she has been having increased vaginal bleeding, now with 3 pads per day and a large clot discharge yesterday, previously was using about one pad per day with on/off vaginal bleeding since her diagnosis 11 months ago. At the office, her BP was 87/56 and complained of some lightheadedness/dizziness, but denies any syncope or falls. Pt states that she has also been constipated for the past 4 days and vomited once- nonbilious and nonbloody emesis, but is still passing flatus, and denies any black or red colored stools, and denies any nausea or vomiting at this time. She denies any light headedness, dizziness, chest pain, palpitations, LOPEZ.     imaging:     < from: CT Abdomen and Pelvis No Cont (01.27.20 @ 18:46) >  REPRODUCTIVE ORGANS: Redemonstration is marked distention of the endometrial cavity with tumor measuring up to 11.2 cm in largest axial dimension, previously measuring 10.2 cm. The cervical portion of the mass measures 9.1 cm and previously measured 6.8 cm. Foci of air are noted in the cervix likelyrelated to recent biopsy or areas of necrosis.    BOWEL: No bowel obstruction. Appendix is normal.  PERITONEUM: No ascites.  VESSELS: Atherosclerotic changes.  RETROPERITONEUM/LYMPH NODES:     New large mass adjacent to the left adrenal gland as described above.     Right obturator lymph noted (2:24) measuring 1.4 x 1.0 cm, previously 1.4 x 1.4 cm.    ABDOMINAL WALL: Within normal limits.  BONES: Degenerative changes.    IMPRESSION:     Large endometrial and cervical mass which increased in size as compared with the prior study consistent patient's known carcinosarcoma.  Foci of air are noted in the cervix likely related to recent biopsy or areas of necrosis.    New left para-aortic mass consistent metastatic disease.        Allergies    No Known Allergies    Intolerances        ROS: [  ] Fever  [  ] Chills  [  ]Chest Pain [  ] SOB  [  ]Cough [  ] N/V  [  ] Diarrhea [  ]Constipation [  ]Other ROS:  [  ] ROS otherwise negative    PAST MEDICAL & SURGICAL HISTORY:  Malignant neoplasm of uterus, unspecified site  No pertinent past medical history  History of cervical biopsy  No significant past surgical history      FAMILY HISTORY:  No pertinent family history in first degree relatives      MEDICATIONS  (STANDING):  chlorhexidine 4% Liquid 1 Application(s) Topical daily  gabapentin 300 milliGRAM(s) Oral two times a day  polyethylene glycol 3350 17 Gram(s) Oral daily  sodium chloride 0.9%. 1000 milliLiter(s) (100 mL/Hr) IV Continuous <Continuous>    MEDICATIONS  (PRN):  ondansetron Injectable 4 milliGRAM(s) IV Push every 8 hours PRN Nausea and/or Vomiting      PHYSICAL EXAM  Vital Signs Last 24 Hrs  T(C): 37 (29 Jan 2020 06:54), Max: 37.6 (28 Jan 2020 21:43)  T(F): 98.6 (29 Jan 2020 06:54), Max: 99.6 (28 Jan 2020 21:43)  HR: 90 (29 Jan 2020 06:54) (81 - 91)  BP: 102/53 (29 Jan 2020 06:54) (100/65 - 109/64)  BP(mean): --  RR: 16 (29 Jan 2020 06:54) (16 - 18)  SpO2: 97% (29 Jan 2020 06:54) (97% - 100%)    General: Well nourished, well developed, no acute distress  HEENT: NC/AT; EOMI, PERRL, sclera nonicteric; external ears normal; no rhinorrhea or epistaxis; mucous membranes moist; oropharynx clear and without erythema  CV: NR, RR; no appreciable r/m/g  Lungs: CTAB, no increased work of breathing  Abodmen: Bowel sounds present; soft, NTND  MSK: Vertebral spine non-tender to palpation  Neuro: AAOx3; cranial nerves II-XII intact; strength 5/5 in upper and lower extremities; sensation to light touch in tact bilaterally.  Psych: Full affect; mood congruent  Skin: no visible rashes on limited examination    ASSESSMENT/PLAN    SILVIA LEAVITT is a 69y woman with h/o Stage IV high grade uterine carcinosarcoma with vaginal bleeding.  the mass is about 18cm in greatest dimension and we do believe RT may be of limited benefit in reducing tumor burden  but we will plan for simulation (tomorrow) and treatment of five fractions to reduce any symptoms of vaginal bleeding.     We discussed the use of palliative radiation in this setting, namely to improve quality of life through the reduction of symptoms.  We talked about the risks, benefits, acute and long term side effects, as well as expected treatment outcomes.  He/She was given the opportunity to ask questions, which were answered to his/her apparent satisfaction.  He/She provided written consent to proceed with radiation therapy. We will arrange for inpatient  treatment.    [phone] HPI:  Franny Leavitt is a 70 YO F with h/o Stage IV uterine  carcinosarcoma (on Carboplatin) s/p 7 cycles of chemotherapy the last December of 2019 who presents from Oncologists (Dr Sona Garcia) office for hypotension.  She has an intermittent history of vaginal bleeding for many months and was found to be anemic with a hemoglobin of 5.9 on 1/27/20.       Pt states that she had a regular f/u with the oncologist today, but for the past week she has been having increased vaginal bleeding, now with 3 pads per day and a large clot discharge yesterday, previously was using about one pad per day with on/off vaginal bleeding since her diagnosis 11 months ago. At the office, her BP was 87/56 and complained of some lightheadedness/dizziness, but denies any syncope or falls. Pt states that she has also been constipated for the past 4 days and vomited once- nonbilious and nonbloody emesis, but is still passing flatus, and denies any black or red colored stools, and denies any nausea or vomiting at this time. She denies any light headedness, dizziness, chest pain, palpitations, LOPEZ.     imaging:     < from: CT Abdomen and Pelvis No Cont (01.27.20 @ 18:46) >  REPRODUCTIVE ORGANS: Re-demonstration is marked distention of the endometrial cavity with tumor measuring up to 11.2 cm in largest axial dimension, previously measuring 10.2 cm. The cervical portion of the mass measures 9.1 cm and previously measured 6.8 cm. Foci of air are noted in the cervix likely related to recent biopsy or areas of necrosis.    BOWEL: No bowel obstruction. Appendix is normal.  PERITONEUM: No ascites.  VESSELS: Atherosclerotic changes.  RETROPERITONEUM/LYMPH NODES:     New large mass adjacent to the left adrenal gland as described above.     Right obturator lymph noted (2:24) measuring 1.4 x 1.0 cm, previously 1.4 x 1.4 cm.    ABDOMINAL WALL: Within normal limits.  BONES: Degenerative changes.    IMPRESSION:     Large endometrial and cervical mass which increased in size as compared with the prior study consistent patient's known carcinosarcoma.  Foci of air are noted in the cervix likely related to recent biopsy or areas of necrosis.    New left para-aortic mass consistent metastatic disease.    No Known Allergies    Intolerances    ROS: [  ] Fever  [  ] Chills  [  ]Chest Pain [  ] SOB  [  ]Cough [  ] N/V  [  ] Diarrhea [  ]Constipation [  ]Other ROS:  [  ] ROS otherwise negative    PAST MEDICAL & SURGICAL HISTORY:  Malignant neoplasm of uterus, unspecified site  No pertinent past medical history  History of cervical biopsy  No significant past surgical history      FAMILY HISTORY:  No pertinent family history in first degree relatives      MEDICATIONS  (STANDING):  chlorhexidine 4% Liquid 1 Application(s) Topical daily  gabapentin 300 milliGRAM(s) Oral two times a day  polyethylene glycol 3350 17 Gram(s) Oral daily  sodium chloride 0.9%. 1000 milliLiter(s) (100 mL/Hr) IV Continuous <Continuous>    MEDICATIONS  (PRN):  ondansetron Injectable 4 milliGRAM(s) IV Push every 8 hours PRN Nausea and/or Vomiting      PHYSICAL EXAM  Vital Signs Last 24 Hrs  T(C): 37 (29 Jan 2020 06:54), Max: 37.6 (28 Jan 2020 21:43)  T(F): 98.6 (29 Jan 2020 06:54), Max: 99.6 (28 Jan 2020 21:43)  HR: 90 (29 Jan 2020 06:54) (81 - 91)  BP: 102/53 (29 Jan 2020 06:54) (100/65 - 109/64)  BP(mean): --  RR: 16 (29 Jan 2020 06:54) (16 - 18)  SpO2: 97% (29 Jan 2020 06:54) (97% - 100%)    General: Well nourished, well developed, no acute distress, alert, oriented, responsive  HEENT: NC/AT; EOMI, PERRL, sclera nonicteric; external ears normal; no rhinorrhea or epistaxis; mucous membranes moist; oropharynx clear and without erythema  CV: NR, RR; no appreciable r/m/g  Lungs: CTAB, no increased work of breathing  Abdomen: Moderately distended, firm, sensation of a large, midline mass, non-tender, no active vaginal bleeding at this time  MSK: Vertebral spine non-tender to palpation  Neuro: AAOx3; cranial nerves II-XII intact; strength 5/5 in upper and lower extremities; sensation to light touch intact bilaterally.  Psych: Full affect; mood congruent  Skin: no visible rashes on limited examination    ASSESSMENT/PLAN    FRANNY LEAVITT is a 69y woman with h/o Stage IV high grade uterine carcinosarcoma with vaginal bleeding, s/p systemic therapy, most recently in December.  Now with increased but intermittent vaginal bleeding, 1/27/2020 CT scan shows the pelvic/uterine mass is about 18cm in greatest dimension .  The limited potential benefit of palliative pelvic radiation was discussed with the patient and Dr Calderón, given the size of the mass and the sarcomatous pathology.  We will plan for simulation (tomorrow) and treatment of five fractions to reduce the  symptoms of vaginal bleeding.     We discussed the use of palliative radiation in this setting, namely to improve quality of life through the reduction of symptoms.  We talked about the risks, benefits, acute and long term side effects, as well as expected treatment outcomes.  Ms Leavitt was given the opportunity to ask questions, which were answered to her apparent satisfaction.  She provided written consent to proceed with radiation therapy. We will arrange for inpatient  treatment.    [phone]

## 2020-01-29 NOTE — CHART NOTE - NSCHARTNOTEFT_GEN_A_CORE
IR consulted for pelvic mass embolization in a patient with history of endometrial CA and intermittent vaginal bleeding. Patient is hemodynamically stable and last required pRBC on 1/27/20. Rad Onc on board for palliative external beam radiation. Recommend gyn oncology evaluation at this time. Given the patients stability, no emergent indication for uterine artery embolization.

## 2020-01-29 NOTE — CONSULT NOTE ADULT - PROBLEM SELECTOR RECOMMENDATION 2
- Patient with intermittent nausea and vomiting.   - Zofran 4mg IV prn is available.  - Encouraged patient to use.

## 2020-01-29 NOTE — PROGRESS NOTE ADULT - SUBJECTIVE AND OBJECTIVE BOX
Patient is a 69y old  Female who presents with a chief complaint of Sent from Onc office for hypotension (28 Jan 2020 10:42)      SUBJECTIVE / OVERNIGHT EVENTS:    MEDICATIONS  (STANDING):  chlorhexidine 4% Liquid 1 Application(s) Topical daily  gabapentin 300 milliGRAM(s) Oral two times a day  polyethylene glycol 3350 17 Gram(s) Oral daily  sodium chloride 0.9%. 1000 milliLiter(s) (100 mL/Hr) IV Continuous <Continuous>    MEDICATIONS  (PRN):  ondansetron Injectable 4 milliGRAM(s) IV Push every 8 hours PRN Nausea and/or Vomiting          PHYSICAL EXAM:  GENERAL: NAD, well-developed  HEAD:  Atraumatic, Normocephalic  EYES: EOMI, PERRLA, conjunctiva and sclera clear  NECK: Supple, No JVD  CHEST/LUNG: Clear to auscultation bilaterally; No wheeze  HEART: Regular rate and rhythm; No murmurs, rubs, or gallops  ABDOMEN: Soft, Nontender, Nondistended; Bowel sounds present  EXTREMITIES:  2+ Peripheral Pulses, No clubbing, cyanosis, or edema  PSYCH: AAOx3  NEUROLOGY: non-focal  SKIN: No rashes or lesions    LABS:                        7.6    8.38  )-----------( 225      ( 29 Jan 2020 06:30 )             24.1     01-29    139  |  105  |  20  ----------------------------<  98  3.7   |  20<L>  |  1.45<H>    Ca    8.7      29 Jan 2020 06:30  Phos  2.8     01-28  Mg     1.3     01-28    TPro  6.5  /  Alb  2.6<L>  /  TBili  0.3  /  DBili  x   /  AST  30  /  ALT  14  /  AlkPhos  83  01-29    PT/INR - ( 27 Jan 2020 13:00 )   PT: 15.6 SEC;   INR: 1.39          PTT - ( 27 Jan 2020 13:00 )  PTT:26.7 SEC          RADIOLOGY & ADDITIONAL TESTS:    Imaging Personally Reviewed:    Consultant(s) Notes Reviewed:      Care Discussed with Consultants/Other Providers:

## 2020-01-29 NOTE — CONSULT NOTE ADULT - PROBLEM SELECTOR RECOMMENDATION 9
- Patient continues to bleed, with intermittent spotting and clots.  - Supportive care.  - Radiation Medicine input appreciated.  - Patient to receive 5 total sessions of Palliative RT.

## 2020-01-29 NOTE — CONSULT NOTE ADULT - SUBJECTIVE AND OBJECTIVE BOX
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 Pt states that she had a regular f/u with the oncologist today, but for the past week she has been having increased vaginal bleeding, now with 3 pads per day and a large clot discharge yesterday, previously was using about one pad per day with on/off vaginal bleeding since her diagnosis 11 months ago. At the office, her BP was 87/56 and complained of some lightheadedness/dizziness, but denies any syncope or falls. Pt states that she has also been constipated for the past 4 days and vomited once- nonbilious and nonbloody emesis, but is still passing flatus, and denies any black or red colored stools, and denies any nausea or vomiting at this time. She denies any light headedness, dizziness, chest pain, palpitations, LOPEZ.     In the ED:BP:'s, HR='s, tmax=98.5F, resp 12, sat 100 % on RA. labs: Hgb=5.9, WBC=12.64, Cr=2.37, K=3.8, CT with Large endometrial and cervical mass which increased in size as compared with the prior study consistent patient's known carcinosarcoma.  Foci of air are noted in the cervix likely related to recent biopsy or areas of necrosis.New left para-aortic mass consistent metastatic disease.  Pt received 2 U PRBC, 1L IVF, and tylenol 650mg PO. (2020 22:33)    PERTINENT PM/SXH:   Malignant neoplasm of uterus, unspecified site  No pertinent past medical history    History of cervical biopsy  No significant past surgical history    FAMILY HISTORY:  No pertinent family history in first degree relatives    ITEMS NOT CHECKED ARE NOT PRESENT    SOCIAL HISTORY:   Significant other/partner:  [ ]  Children:  [x ]  Mosque/Spirituality: Rastafarian  Substance hx:  [ ]   Tobacco hx:  [ ]   Alcohol hx: [ ]   Home Opioid hx:  [ ] I-Stop Reference No:  Living Situation: [x ]Home  [ ]Long term care  [ ]Rehab [ ]Other    ADVANCE DIRECTIVES:    DNR  MOLST  [ ]  Living Will  [ ]   DECISION MAKER(s):  [ ] Health Care Proxy(s)  [x ] Surrogate(s)  [ ] Guardian           Name(s): Phone Number(s): Emil Taylor (son)     BASELINE (I)ADL(s) (prior to admission):  [place]: [ ]Total  [x ] Moderate [ ]Dependent    Allergies    No Known Allergies    Intolerances    MEDICATIONS  (STANDING):  cefTRIAXone   IVPB 1000 milliGRAM(s) IV Intermittent every 24 hours  chlorhexidine 4% Liquid 1 Application(s) Topical daily  gabapentin 300 milliGRAM(s) Oral two times a day  lactulose Syrup 10 Gram(s) Oral every 6 hours  magnesium oxide 400 milliGRAM(s) Oral three times a day with meals  polyethylene glycol 3350 17 Gram(s) Oral daily  sodium chloride 0.9%. 1000 milliLiter(s) (100 mL/Hr) IV Continuous <Continuous>    MEDICATIONS  (PRN):  bisacodyl 5 milliGRAM(s) Oral every 12 hours PRN Constipation  ondansetron Injectable 4 milliGRAM(s) IV Push every 8 hours PRN Nausea and/or Vomiting    PRESENT SYMPTOMS: [ ]Unable to obtain due to poor mentation   Source if other than patient:  [ ]Family   [ ]Team     Pain (Impact on QOL):  None  Location -         Minimal acceptable level (0-10 scale):                    Aggravating factors -  Quality -  Radiation -  Severity (0-10 scale) -    Timing -    PAIN AD Score:     [email or web address].[email or web address]/[email or web address] (press ctrl +  left click to view)    Dyspnea:                           [ x]Mild [ ]Moderate [ ]Severe  Anxiety:                             [ ]Mild [x ]Moderate [ ]Severe  Fatigue:                             [ ]Mild [x ]Moderate [ ]Severe  Nausea:                             [ ]Mild [x]Moderate [ ]Severe  Loss of appetite:              [ ]Mild [x ]Moderate [ ]Severe  Constipation:                    [ ]Mild [ ]Moderate [ ]Severe  Grief Present                    [ ] Yes   [x ] No   Other Symptoms:  [x ]All other review of systems negative     Karnofsky Performance Score/Palliative Performance Status Version 2:   50      %    http://palliative.info/resource_material/[email or web address].pdf  PHYSICAL EXAM:  Vital Signs Last 24 Hrs  T(C): 36.8 (2020 05:22), Max: 37.3 (2020 21:54)  T(F): 98.2 (2020 05:22), Max: 99.1 (2020 21:54)  HR: 94 (2020 05:22) (89 - 95)  BP: 97/62 (2020 05:22) (97/62 - 123/74)  BP(mean): --  RR: 18 (2020 05:22) (17 - 18)  SpO2: 100% (2020 05:22) (98% - 100%) I&O's Summary    2020 07:01  -  2020 07:00  --------------------------------------------------------  IN: 2 mL / OUT: 0 mL / NET: 2 mL    GENERAL:  [x ]Alert  [x ]Oriented x   [ ]Lethargic  [ ]Cachexia  [ ]Unarousable  [ ]Verbal  [ ]Non-Verbal  Behavioral:   [ ] Anxiety  [ ] Delirium [ ] Agitation [x ] Other  HEENT:  [ ]Normal   [x ]Dry mouth   [ ]ET Tube/Trach  [ ]Oral lesions  PULMONARY:   [ x]Clear [ ]Tachypnea  [ ]Audible excessive secretions   [ ]Rhonchi        [ ]Right [ ]Left [ ]Bilateral  [ ]Crackles        [ ]Right [ ]Left [ ]Bilateral  [ ]Wheezing     [ ]Right [ ]Left [ ]Bilateral  CARDIOVASCULAR:    [x ]Regular [ ]Irregular [ ]Tachy  [ ]Teja [ ]Murmur [ ]Other  GASTROINTESTINAL:  [x ]Soft  [ ]Distended   [x]+BS  [x ]Non tender [ ]Tender  [ ]PEG [ ]OGT/ NGT  Last BM:   GENITOURINARY:  [x ]Normal [ ] Incontinent   [ ]Oliguria/Anuria   [ ][place]  MUSCULOSKELETAL:   [ ]Normal   [ x]Weakness  [ ]Bed/Wheelchair bound [ ]Edema  NEUROLOGIC:   [x ]No focal deficits  [ ] Cognitive impairment  [ ] Dysphagia [ ]Dysarthria [ ] Paresis [ ]Other   SKIN:   [x ]Normal   [ ]Pressure ulcer(s)  [ ]Rash    CRITICAL CARE:  [ ] Shock Present  [ ]Septic [ ]Cardiogenic [ ]Neurologic [ ]Hypovolemic  [ ]  Vasopressors [ ]  Inotropes   [ ] Respiratory failure present  [ ] Acute  [ ] Chronic [ ] Hypoxic  [ ] Hypercarbic [ ] Other  [ ] Other organ failure     LABS:                        7.9    8.53  )-----------( 199      ( 2020 05:15 )             25.0       138  |  105  |  19  ----------------------------<  98  4.0   |  20<L>  |  1.44<H>    Ca    9.0      2020 05:15  Phos  3.3       Mg     1.2             Urinalysis Basic - ( 2020 22:45 )    Color: LT. YELLOW / Appearance: Lt TURBID / S.017 / pH: 6.0  Gluc: NEGATIVE / Ketone: NEGATIVE  / Bili: NEGATIVE / Urobili: NORMAL   Blood: LARGE / Protein: 100 / Nitrite: NEGATIVE   Leuk Esterase: LARGE / RBC: >50 / WBC >50   Sq Epi: OCC / Non Sq Epi: x / Bacteria: FEW      RADIOLOGY & ADDITIONAL STUDIES:    PROTEIN CALORIE MALNUTRITION PRESENT: [ ] Yes [ ] No  [ ] PPSV2 < or = to 30% [ ] significant weight loss  [ ] poor nutritional intake [ ] catabolic state [ ] anasarca     Albumin, Serum: 2.6 g/dL (20 @ 06:30)  Artificial Nutrition [ ]     REFERRALS:   [ ]Chaplaincy  [ ] Hospice  [ ]Child Life  [ ]Social Work  [ ]Case management [ ]Holistic Therapy   Goals of Care Discussion Document:

## 2020-01-29 NOTE — CHART NOTE - NSCHARTNOTEFT_GEN_A_CORE
IR consulted for evaluation of possible ablation. Fellow to discuss with attg. Awaiting official recommendations.

## 2020-01-29 NOTE — PROGRESS NOTE ADULT - ASSESSMENT
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 Anemia due to blood loss. -likely 2/2 vaginal bleed in the setting of uterine cancer.    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   Hgb 8.3 today s/p PRBC on 1/28/20 now 7.6- will ask for RT - Onc eval for ?? RT 68 yo  with stage IV high-grade uterine carcinosarcoma dx 3/2019, s/p carbo/taxol 7 cycles followed by single agent carboplatin last dose 2019 presenting for hypotension from [facility] [place].    Patient initially admitted, found to have protruding uterine mass consistent with high grade uterine carcinosarcoma. CTAP at that time significant for metastatic lesions consistent with Stage IV disease.     She was found to be hypotensive to 87/56 at [facility] [place] and c/o lightheadedness/dizziness since this morning. She reports vaginal spotting and having passed a lemon sized clot yesterday with no further bleeding since. She reports vaginal discharge with foul odor for months. She endorses nausea/vomiting 2 days ago and again today at Dr. Garcia's office with 1x episode of NBNB emesis. Last BM 2 days ago. Passing flatus today.       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 Anemia due to blood loss. -likely 2/2 vaginal bleed in the setting of uterine cancer.    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   Hgb 8.3 today s/p PRBC on 20 now 7.6- will ask for RT - Onc eval for ?? RT 68 yo  with stage IV high-grade uterine carcinosarcoma dx 3/2019, s/p carbo/taxol 7 cycles followed by single agent carboplatin last dose 2019 presenting for hypotension from [facility] [place].    Patient initially admitted, found to have protruding uterine mass consistent with high grade uterine carcinosarcoma. CTAP at that time significant for metastatic lesions consistent with Stage IV disease.     She was found to be hypotensive to 87/56 at [facility] and c/o lightheadedness/dizziness since this morning. She reports vaginal spotting and having passed a lemon sized clot yesterday with no further bleeding since. She reports vaginal discharge with foul odor for months. She endorses nausea/vomiting 2 days ago and again today at Dr. Garcia's office with 1x episode of NBNB emesis. Last BM 2 days ago. Passing flatus today.       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 Anemia due to blood loss. -likely 2/2 vaginal bleed in the setting of uterine cancer.    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   Hgb 8.3 today s/p PRBC on 20 now 7.6- will ask for RT - Onc eval for ?? RT    Seen by GYN Onc- Dr Bolton appreciated  "If significant vaginal bleeding, consider vaginal packing and radiation therapy   - Gyn Onc will continue to follow   Patient seen and evaluated, bleeding decreased, however she had persistent bleeding with final hg 5  Palliative chemo continuing, however, if bleeding returns, would recommend considering palliative RT to the pelvis, given large pelvic mass that is likely bleeding. Patient has never had RT in the past  Recommend rad onc consult ."

## 2020-01-29 NOTE — CONSULT NOTE ADULT - ASSESSMENT
69 F with vaginal bleeding, abdominal pain, nausea, stage IV uterine carcinoma, encounter for palliative care.

## 2020-01-29 NOTE — CONSULT NOTE ADULT - PROBLEM SELECTOR RECOMMENDATION 3
- Patient with progression of known endometrial/ cervical mass.  - Also with evidence of para-aortic metastatic disease.  - Prognosis is guarded.  - Patient likely to follow  up with Dr. Sona Garcia as an outpatient.

## 2020-01-30 LAB
ANION GAP SERPL CALC-SCNC: 13 MMO/L — SIGNIFICANT CHANGE UP (ref 7–14)
APPEARANCE UR: SIGNIFICANT CHANGE UP
BACTERIA # UR AUTO: SIGNIFICANT CHANGE UP
BASOPHILS # BLD AUTO: 0.01 K/UL — SIGNIFICANT CHANGE UP (ref 0–0.2)
BASOPHILS NFR BLD AUTO: 0.1 % — SIGNIFICANT CHANGE UP (ref 0–2)
BILIRUB UR-MCNC: NEGATIVE — SIGNIFICANT CHANGE UP
BLD GP AB SCN SERPL QL: NEGATIVE — SIGNIFICANT CHANGE UP
BLOOD UR QL VISUAL: HIGH
BUN SERPL-MCNC: 19 MG/DL — SIGNIFICANT CHANGE UP (ref 7–23)
CALCIUM SERPL-MCNC: 8.8 MG/DL — SIGNIFICANT CHANGE UP (ref 8.4–10.5)
CHLORIDE SERPL-SCNC: 105 MMOL/L — SIGNIFICANT CHANGE UP (ref 98–107)
CO2 SERPL-SCNC: 21 MMOL/L — LOW (ref 22–31)
COLOR SPEC: SIGNIFICANT CHANGE UP
CREAT SERPL-MCNC: 1.4 MG/DL — HIGH (ref 0.5–1.3)
EOSINOPHIL # BLD AUTO: 0.06 K/UL — SIGNIFICANT CHANGE UP (ref 0–0.5)
EOSINOPHIL NFR BLD AUTO: 0.7 % — SIGNIFICANT CHANGE UP (ref 0–6)
GLUCOSE SERPL-MCNC: 107 MG/DL — HIGH (ref 70–99)
GLUCOSE UR-MCNC: NEGATIVE — SIGNIFICANT CHANGE UP
HCT VFR BLD CALC: 21 % — CRITICAL LOW (ref 34.5–45)
HCT VFR BLD CALC: 23.8 % — LOW (ref 34.5–45)
HCT VFR BLD CALC: 25.3 % — LOW (ref 34.5–45)
HCT VFR BLD CALC: 29.8 % — LOW (ref 34.5–45)
HGB BLD-MCNC: 6.6 G/DL — CRITICAL LOW (ref 11.5–15.5)
HGB BLD-MCNC: 7.8 G/DL — LOW (ref 11.5–15.5)
HGB BLD-MCNC: 8.2 G/DL — LOW (ref 11.5–15.5)
HGB BLD-MCNC: 9.4 G/DL — LOW (ref 11.5–15.5)
HYALINE CASTS # UR AUTO: NEGATIVE — SIGNIFICANT CHANGE UP
IMM GRANULOCYTES NFR BLD AUTO: 0.9 % — SIGNIFICANT CHANGE UP (ref 0–1.5)
KETONES UR-MCNC: NEGATIVE — SIGNIFICANT CHANGE UP
LEUKOCYTE ESTERASE UR-ACNC: SIGNIFICANT CHANGE UP
LYMPHOCYTES # BLD AUTO: 1.67 K/UL — SIGNIFICANT CHANGE UP (ref 1–3.3)
LYMPHOCYTES # BLD AUTO: 19.3 % — SIGNIFICANT CHANGE UP (ref 13–44)
MAGNESIUM SERPL-MCNC: 1.3 MG/DL — LOW (ref 1.6–2.6)
MCHC RBC-ENTMCNC: 28.9 PG — SIGNIFICANT CHANGE UP (ref 27–34)
MCHC RBC-ENTMCNC: 29.2 PG — SIGNIFICANT CHANGE UP (ref 27–34)
MCHC RBC-ENTMCNC: 29.5 PG — SIGNIFICANT CHANGE UP (ref 27–34)
MCHC RBC-ENTMCNC: 29.5 PG — SIGNIFICANT CHANGE UP (ref 27–34)
MCHC RBC-ENTMCNC: 31.4 % — LOW (ref 32–36)
MCHC RBC-ENTMCNC: 31.5 % — LOW (ref 32–36)
MCHC RBC-ENTMCNC: 32.4 % — SIGNIFICANT CHANGE UP (ref 32–36)
MCHC RBC-ENTMCNC: 32.8 % — SIGNIFICANT CHANGE UP (ref 32–36)
MCV RBC AUTO: 90.2 FL — SIGNIFICANT CHANGE UP (ref 80–100)
MCV RBC AUTO: 91 FL — SIGNIFICANT CHANGE UP (ref 80–100)
MCV RBC AUTO: 91.7 FL — SIGNIFICANT CHANGE UP (ref 80–100)
MCV RBC AUTO: 92.9 FL — SIGNIFICANT CHANGE UP (ref 80–100)
MONOCYTES # BLD AUTO: 0.87 K/UL — SIGNIFICANT CHANGE UP (ref 0–0.9)
MONOCYTES NFR BLD AUTO: 10.1 % — SIGNIFICANT CHANGE UP (ref 2–14)
NEUTROPHILS # BLD AUTO: 5.95 K/UL — SIGNIFICANT CHANGE UP (ref 1.8–7.4)
NEUTROPHILS NFR BLD AUTO: 68.9 % — SIGNIFICANT CHANGE UP (ref 43–77)
NITRITE UR-MCNC: NEGATIVE — SIGNIFICANT CHANGE UP
NRBC # FLD: 0 K/UL — SIGNIFICANT CHANGE UP (ref 0–0)
PH UR: 6 — SIGNIFICANT CHANGE UP (ref 5–8)
PHOSPHATE SERPL-MCNC: 2.8 MG/DL — SIGNIFICANT CHANGE UP (ref 2.5–4.5)
PLATELET # BLD AUTO: 200 K/UL — SIGNIFICANT CHANGE UP (ref 150–400)
PLATELET # BLD AUTO: 205 K/UL — SIGNIFICANT CHANGE UP (ref 150–400)
PLATELET # BLD AUTO: 209 K/UL — SIGNIFICANT CHANGE UP (ref 150–400)
PLATELET # BLD AUTO: 219 K/UL — SIGNIFICANT CHANGE UP (ref 150–400)
PMV BLD: 9.6 FL — SIGNIFICANT CHANGE UP (ref 7–13)
PMV BLD: 9.6 FL — SIGNIFICANT CHANGE UP (ref 7–13)
PMV BLD: 9.7 FL — SIGNIFICANT CHANGE UP (ref 7–13)
PMV BLD: 9.7 FL — SIGNIFICANT CHANGE UP (ref 7–13)
POTASSIUM SERPL-MCNC: 3.7 MMOL/L — SIGNIFICANT CHANGE UP (ref 3.5–5.3)
POTASSIUM SERPL-SCNC: 3.7 MMOL/L — SIGNIFICANT CHANGE UP (ref 3.5–5.3)
PROT UR-MCNC: 100 — HIGH
RBC # BLD: 2.26 M/UL — LOW (ref 3.8–5.2)
RBC # BLD: 2.64 M/UL — LOW (ref 3.8–5.2)
RBC # BLD: 2.78 M/UL — LOW (ref 3.8–5.2)
RBC # BLD: 3.25 M/UL — LOW (ref 3.8–5.2)
RBC # FLD: 17.9 % — HIGH (ref 10.3–14.5)
RBC # FLD: 18 % — HIGH (ref 10.3–14.5)
RBC # FLD: 18.3 % — HIGH (ref 10.3–14.5)
RBC # FLD: 19.4 % — HIGH (ref 10.3–14.5)
RBC CASTS # UR COMP ASSIST: >50 — HIGH (ref 0–?)
RH IG SCN BLD-IMP: POSITIVE — SIGNIFICANT CHANGE UP
SODIUM SERPL-SCNC: 139 MMOL/L — SIGNIFICANT CHANGE UP (ref 135–145)
SP GR SPEC: 1.02 — SIGNIFICANT CHANGE UP (ref 1–1.04)
SPECIMEN SOURCE: SIGNIFICANT CHANGE UP
SPECIMEN SOURCE: SIGNIFICANT CHANGE UP
SQUAMOUS # UR AUTO: SIGNIFICANT CHANGE UP
UROBILINOGEN FLD QL: NORMAL — SIGNIFICANT CHANGE UP
WBC # BLD: 8.36 K/UL — SIGNIFICANT CHANGE UP (ref 3.8–10.5)
WBC # BLD: 8.64 K/UL — SIGNIFICANT CHANGE UP (ref 3.8–10.5)
WBC # BLD: 9.13 K/UL — SIGNIFICANT CHANGE UP (ref 3.8–10.5)
WBC # BLD: 9.43 K/UL — SIGNIFICANT CHANGE UP (ref 3.8–10.5)
WBC # FLD AUTO: 8.36 K/UL — SIGNIFICANT CHANGE UP (ref 3.8–10.5)
WBC # FLD AUTO: 8.64 K/UL — SIGNIFICANT CHANGE UP (ref 3.8–10.5)
WBC # FLD AUTO: 9.13 K/UL — SIGNIFICANT CHANGE UP (ref 3.8–10.5)
WBC # FLD AUTO: 9.43 K/UL — SIGNIFICANT CHANGE UP (ref 3.8–10.5)
WBC UR QL: >50 — HIGH (ref 0–?)

## 2020-01-30 PROCEDURE — 77334 RADIATION TREATMENT AID(S): CPT | Mod: 26

## 2020-01-30 PROCEDURE — 77295 3-D RADIOTHERAPY PLAN: CPT | Mod: 26

## 2020-01-30 PROCEDURE — 99232 SBSQ HOSP IP/OBS MODERATE 35: CPT

## 2020-01-30 PROCEDURE — 71045 X-RAY EXAM CHEST 1 VIEW: CPT | Mod: 26

## 2020-01-30 PROCEDURE — 77300 RADIATION THERAPY DOSE PLAN: CPT | Mod: 26

## 2020-01-30 RX ORDER — MAGNESIUM OXIDE 400 MG ORAL TABLET 241.3 MG
400 TABLET ORAL
Refills: 0 | Status: COMPLETED | OUTPATIENT
Start: 2020-01-30 | End: 2020-02-01

## 2020-01-30 RX ORDER — CEFTRIAXONE 500 MG/1
1000 INJECTION, POWDER, FOR SOLUTION INTRAMUSCULAR; INTRAVENOUS EVERY 24 HOURS
Refills: 0 | Status: COMPLETED | OUTPATIENT
Start: 2020-01-30 | End: 2020-02-01

## 2020-01-30 RX ORDER — ACETAMINOPHEN 500 MG
650 TABLET ORAL ONCE
Refills: 0 | Status: COMPLETED | OUTPATIENT
Start: 2020-01-30 | End: 2020-01-30

## 2020-01-30 RX ORDER — LACTULOSE 10 G/15ML
10 SOLUTION ORAL EVERY 6 HOURS
Refills: 0 | Status: DISCONTINUED | OUTPATIENT
Start: 2020-01-30 | End: 2020-02-15

## 2020-01-30 RX ORDER — MAGNESIUM OXIDE 400 MG ORAL TABLET 241.3 MG
400 TABLET ORAL
Refills: 0 | Status: DISCONTINUED | OUTPATIENT
Start: 2020-01-30 | End: 2020-01-30

## 2020-01-30 RX ADMIN — Medication 650 MILLIGRAM(S): at 21:58

## 2020-01-30 RX ADMIN — Medication 650 MILLIGRAM(S): at 22:58

## 2020-01-30 RX ADMIN — Medication 650 MILLIGRAM(S): at 00:00

## 2020-01-30 RX ADMIN — ONDANSETRON 4 MILLIGRAM(S): 8 TABLET, FILM COATED ORAL at 21:05

## 2020-01-30 RX ADMIN — CEFTRIAXONE 100 MILLIGRAM(S): 500 INJECTION, POWDER, FOR SOLUTION INTRAMUSCULAR; INTRAVENOUS at 13:05

## 2020-01-30 RX ADMIN — Medication 5 MILLIGRAM(S): at 13:04

## 2020-01-30 RX ADMIN — GABAPENTIN 300 MILLIGRAM(S): 400 CAPSULE ORAL at 06:05

## 2020-01-30 RX ADMIN — MAGNESIUM OXIDE 400 MG ORAL TABLET 400 MILLIGRAM(S): 241.3 TABLET ORAL at 17:47

## 2020-01-30 RX ADMIN — MAGNESIUM OXIDE 400 MG ORAL TABLET 400 MILLIGRAM(S): 241.3 TABLET ORAL at 11:46

## 2020-01-30 RX ADMIN — CHLORHEXIDINE GLUCONATE 1 APPLICATION(S): 213 SOLUTION TOPICAL at 11:46

## 2020-01-30 RX ADMIN — LACTULOSE 10 GRAM(S): 10 SOLUTION ORAL at 17:47

## 2020-01-30 NOTE — CHART NOTE - NSCHARTNOTEFT_GEN_A_CORE
given the continued vaginal bleeding and drop in H/H, radiation will start today- a total of 5 treatments are planned    Koko Vargas MD  cell

## 2020-01-30 NOTE — PROGRESS NOTE ADULT - ATTENDING COMMENTS
Rt  no ole for IR ablation of artery at this time- d/w IR Rt  no role for IR ablation of artery at this time- d/w IR.  frver overnight- abn ua- will sent urine cult- will start Ceftriaxone

## 2020-01-30 NOTE — PROGRESS NOTE ADULT - SUBJECTIVE AND OBJECTIVE BOX
Patient is a 69y old  Female who presents with a chief complaint of Sent from Onc office for hypotension (2020 10:50)      SUBJECTIVE / OVERNIGHT EVENTS:  patient sitting in chair.  Seen with PA- patient agreeable to Rt  Noted more PRBC last night- notes she wabnts her son to be HCP  HCP form done- patient is ox3  notes Back pain- no Numbness to extremities able to ambulate    MEDICATIONS  (STANDING):  cefTRIAXone   IVPB 1000 milliGRAM(s) IV Intermittent every 24 hours  chlorhexidine 4% Liquid 1 Application(s) Topical daily  gabapentin 300 milliGRAM(s) Oral two times a day  lactulose Syrup 10 Gram(s) Oral every 6 hours  magnesium oxide 400 milliGRAM(s) Oral three times a day with meals  polyethylene glycol 3350 17 Gram(s) Oral daily  sodium chloride 0.9%. 1000 milliLiter(s) (100 mL/Hr) IV Continuous <Continuous>    MEDICATIONS  (PRN):  bisacodyl 5 milliGRAM(s) Oral every 12 hours PRN Constipation  ondansetron Injectable 4 milliGRAM(s) IV Push every 8 hours PRN Nausea and/or Vomiting    Vital Signs Last 24 Hrs  T(C): 37 (2020 06:03), Max: 38.1 (2020 21:56)  T(F): 98.6 (2020 06:03), Max: 100.5 (2020 21:56)  HR: 91 (2020 06:03) (88 - 91)  BP: 110/66 (2020 06:03) (106/54 - 110/66)  BP(mean): --  RR: 16 (2020 06:03) (16 - 17)  SpO2: 99% (2020 06:03) (97% - 99%)      PHYSICAL EXAM:  GENERAL: NAD, well-developed  HEAD:  Atraumatic, Normocephalic  EYES: EOMI, PERRLA, conjunctiva and sclera clear  NECK: Supple, No JVD  CHEST/LUNG: Clear to auscultation bilaterally; No wheeze  HEART: Regular rate and rhythm; No murmurs, rubs, or gallops  ABDOMEN: Soft, Nontender, Nondistended; Bowel sounds present  EXTREMITIES:  2+ Peripheral Pulses, No clubbing, cyanosis, or edema  PSYCH: AAOx3  NEUROLOGY: non-focal  SKIN: No rashes or lesions    LABS:                        9.4    9.43  )-----------( 219      ( 2020 12:40 )             29.8         139  |  105  |  19  ----------------------------<  107<H>  3.7   |  21<L>  |  1.40<H>    Ca    8.8      2020 06:45  Phos  2.8       Mg     1.3         TPro  6.5  /  Alb  2.6<L>  /  TBili  0.3  /  DBili  x   /  AST  30  /  ALT  14  /  AlkPhos  83            Urinalysis Basic - ( 2020 22:45 )    Color: LT. YELLOW / Appearance: Lt TURBID / S.017 / pH: 6.0  Gluc: NEGATIVE / Ketone: NEGATIVE  / Bili: NEGATIVE / Urobili: NORMAL   Blood: LARGE / Protein: 100 / Nitrite: NEGATIVE   Leuk Esterase: LARGE / RBC: >50 / WBC >50   Sq Epi: OCC / Non Sq Epi: x / Bacteria: FEW            Care Discussed with Consultants/Other Providers:  onc, RT- onc

## 2020-01-30 NOTE — PROGRESS NOTE ADULT - ASSESSMENT
68 yo  with stage IV high-grade uterine carcinosarcoma dx 3/2019, s/p carbo/taxol 7 cycles followed by single agent carboplatin last dose 2019 presenting for hypotension from [facility] [place].    Patient initially admitted, found to have protruding uterine mass consistent with high grade uterine carcinosarcoma. CTAP at that time significant for metastatic lesions consistent with Stage IV disease.     She was found to be hypotensive to 87/56 at [facility] [place] and c/o lightheadedness/dizziness since this morning. She reports vaginal spotting and having passed a lemon sized clot yesterday with no further bleeding since. She reports vaginal discharge with foul odor for months. She endorses nausea/vomiting 2 days ago and again today at Dr. Garcia's office with 1x episode of NBNB emesis. Last BM 2 days ago. Passing flatus today.       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 Anemia due to blood loss. -likely 2/2 vaginal bleed in the setting of uterine cancer.    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   Hgb 8.3 today s/p PRBC on 20 now 7.6- will ask for RT - Onc eval for ?? RT    Seen by GYN Onc- Dr Bolton appreciated  "If significant vaginal bleeding, consider vaginal packing and radiation therapy   - Gyn Onc will continue to follow   Patient seen and evaluated, bleeding decreased, however she had persistent bleeding with final hg 5  Palliative chemo continuing, however, if bleeding returns, would recommend considering palliative RT to the pelvis, given large pelvic mass that is likely bleeding. Patient has never had RT in the past  Recommend rad onc consult ."

## 2020-01-30 NOTE — CHART NOTE - NSCHARTNOTEFT_GEN_A_CORE
Advanced care note:  patient seen with PS .  Patient sitting in chair Ox3 and wants Rt.  patient wants her only Son to be HCP- HCP form completed.  Advanced directives d/w patient - patient wants to be a full code.    35 min to coordinate care

## 2020-01-31 DIAGNOSIS — Z51.5 ENCOUNTER FOR PALLIATIVE CARE: ICD-10-CM

## 2020-01-31 DIAGNOSIS — R11.2 NAUSEA WITH VOMITING, UNSPECIFIED: ICD-10-CM

## 2020-01-31 LAB
ANION GAP SERPL CALC-SCNC: 13 MMO/L — SIGNIFICANT CHANGE UP (ref 7–14)
ANISOCYTOSIS BLD QL: SLIGHT — SIGNIFICANT CHANGE UP
BUN SERPL-MCNC: 19 MG/DL — SIGNIFICANT CHANGE UP (ref 7–23)
CALCIUM SERPL-MCNC: 9 MG/DL — SIGNIFICANT CHANGE UP (ref 8.4–10.5)
CHLORIDE SERPL-SCNC: 105 MMOL/L — SIGNIFICANT CHANGE UP (ref 98–107)
CO2 SERPL-SCNC: 20 MMOL/L — LOW (ref 22–31)
CREAT SERPL-MCNC: 1.44 MG/DL — HIGH (ref 0.5–1.3)
ELLIPTOCYTES BLD QL SMEAR: SLIGHT — SIGNIFICANT CHANGE UP
GLUCOSE SERPL-MCNC: 98 MG/DL — SIGNIFICANT CHANGE UP (ref 70–99)
HCT VFR BLD CALC: 23.4 % — LOW (ref 34.5–45)
HCT VFR BLD CALC: 24.9 % — LOW (ref 34.5–45)
HCT VFR BLD CALC: 25 % — LOW (ref 34.5–45)
HCT VFR BLD CALC: 25.7 % — LOW (ref 34.5–45)
HGB BLD-MCNC: 7.6 G/DL — LOW (ref 11.5–15.5)
HGB BLD-MCNC: 7.9 G/DL — LOW (ref 11.5–15.5)
HGB BLD-MCNC: 7.9 G/DL — LOW (ref 11.5–15.5)
HGB BLD-MCNC: 8.2 G/DL — LOW (ref 11.5–15.5)
LG PLATELETS BLD QL AUTO: SLIGHT — SIGNIFICANT CHANGE UP
MACROCYTES BLD QL: SLIGHT — SIGNIFICANT CHANGE UP
MAGNESIUM SERPL-MCNC: 1.2 MG/DL — LOW (ref 1.6–2.6)
MCHC RBC-ENTMCNC: 29.5 PG — SIGNIFICANT CHANGE UP (ref 27–34)
MCHC RBC-ENTMCNC: 29.6 PG — SIGNIFICANT CHANGE UP (ref 27–34)
MCHC RBC-ENTMCNC: 29.9 PG — SIGNIFICANT CHANGE UP (ref 27–34)
MCHC RBC-ENTMCNC: 29.9 PG — SIGNIFICANT CHANGE UP (ref 27–34)
MCHC RBC-ENTMCNC: 31.6 % — LOW (ref 32–36)
MCHC RBC-ENTMCNC: 31.7 % — LOW (ref 32–36)
MCHC RBC-ENTMCNC: 31.9 % — LOW (ref 32–36)
MCHC RBC-ENTMCNC: 32.5 % — SIGNIFICANT CHANGE UP (ref 32–36)
MCV RBC AUTO: 92.1 FL — SIGNIFICANT CHANGE UP (ref 80–100)
MCV RBC AUTO: 93.3 FL — SIGNIFICANT CHANGE UP (ref 80–100)
MCV RBC AUTO: 93.3 FL — SIGNIFICANT CHANGE UP (ref 80–100)
MCV RBC AUTO: 93.8 FL — SIGNIFICANT CHANGE UP (ref 80–100)
NRBC # FLD: 0 K/UL — SIGNIFICANT CHANGE UP (ref 0–0)
OVALOCYTES BLD QL SMEAR: SLIGHT — SIGNIFICANT CHANGE UP
PHOSPHATE SERPL-MCNC: 3.3 MG/DL — SIGNIFICANT CHANGE UP (ref 2.5–4.5)
PLATELET # BLD AUTO: 126 K/UL — LOW (ref 150–400)
PLATELET # BLD AUTO: 196 K/UL — SIGNIFICANT CHANGE UP (ref 150–400)
PLATELET # BLD AUTO: 199 K/UL — SIGNIFICANT CHANGE UP (ref 150–400)
PLATELET # BLD AUTO: 221 K/UL — SIGNIFICANT CHANGE UP (ref 150–400)
PLATELET CLUMP BLD QL SMEAR: SIGNIFICANT CHANGE UP
PMV BLD: 11.5 FL — SIGNIFICANT CHANGE UP (ref 7–13)
PMV BLD: 9.4 FL — SIGNIFICANT CHANGE UP (ref 7–13)
PMV BLD: 9.7 FL — SIGNIFICANT CHANGE UP (ref 7–13)
PMV BLD: 9.7 FL — SIGNIFICANT CHANGE UP (ref 7–13)
POIKILOCYTOSIS BLD QL AUTO: SLIGHT — SIGNIFICANT CHANGE UP
POLYCHROMASIA BLD QL SMEAR: SLIGHT — SIGNIFICANT CHANGE UP
POTASSIUM SERPL-MCNC: 4 MMOL/L — SIGNIFICANT CHANGE UP (ref 3.5–5.3)
POTASSIUM SERPL-SCNC: 4 MMOL/L — SIGNIFICANT CHANGE UP (ref 3.5–5.3)
RBC # BLD: 2.54 M/UL — LOW (ref 3.8–5.2)
RBC # BLD: 2.67 M/UL — LOW (ref 3.8–5.2)
RBC # BLD: 2.68 M/UL — LOW (ref 3.8–5.2)
RBC # BLD: 2.74 M/UL — LOW (ref 3.8–5.2)
RBC # FLD: 17.7 % — HIGH (ref 10.3–14.5)
RBC # FLD: 17.9 % — HIGH (ref 10.3–14.5)
RBC # FLD: 18.1 % — HIGH (ref 10.3–14.5)
RBC # FLD: 18.2 % — HIGH (ref 10.3–14.5)
REVIEW TO FOLLOW: YES — SIGNIFICANT CHANGE UP
SODIUM SERPL-SCNC: 138 MMOL/L — SIGNIFICANT CHANGE UP (ref 135–145)
WBC # BLD: 7.63 K/UL — SIGNIFICANT CHANGE UP (ref 3.8–10.5)
WBC # BLD: 7.91 K/UL — SIGNIFICANT CHANGE UP (ref 3.8–10.5)
WBC # BLD: 8.53 K/UL — SIGNIFICANT CHANGE UP (ref 3.8–10.5)
WBC # BLD: 8.6 K/UL — SIGNIFICANT CHANGE UP (ref 3.8–10.5)
WBC # FLD AUTO: 7.63 K/UL — SIGNIFICANT CHANGE UP (ref 3.8–10.5)
WBC # FLD AUTO: 7.91 K/UL — SIGNIFICANT CHANGE UP (ref 3.8–10.5)
WBC # FLD AUTO: 8.53 K/UL — SIGNIFICANT CHANGE UP (ref 3.8–10.5)
WBC # FLD AUTO: 8.6 K/UL — SIGNIFICANT CHANGE UP (ref 3.8–10.5)

## 2020-01-31 PROCEDURE — 99232 SBSQ HOSP IP/OBS MODERATE 35: CPT

## 2020-01-31 PROCEDURE — 99233 SBSQ HOSP IP/OBS HIGH 50: CPT

## 2020-01-31 RX ORDER — MAGNESIUM SULFATE 500 MG/ML
2 VIAL (ML) INJECTION ONCE
Refills: 0 | Status: DISCONTINUED | OUTPATIENT
Start: 2020-01-31 | End: 2020-01-31

## 2020-01-31 RX ORDER — ACETAMINOPHEN 500 MG
650 TABLET ORAL ONCE
Refills: 0 | Status: COMPLETED | OUTPATIENT
Start: 2020-01-31 | End: 2020-01-31

## 2020-01-31 RX ORDER — MAGNESIUM SULFATE 500 MG/ML
2 VIAL (ML) INJECTION ONCE
Refills: 0 | Status: COMPLETED | OUTPATIENT
Start: 2020-01-31 | End: 2020-01-31

## 2020-01-31 RX ADMIN — ONDANSETRON 4 MILLIGRAM(S): 8 TABLET, FILM COATED ORAL at 19:33

## 2020-01-31 RX ADMIN — GABAPENTIN 300 MILLIGRAM(S): 400 CAPSULE ORAL at 05:30

## 2020-01-31 RX ADMIN — LACTULOSE 10 GRAM(S): 10 SOLUTION ORAL at 17:35

## 2020-01-31 RX ADMIN — CHLORHEXIDINE GLUCONATE 1 APPLICATION(S): 213 SOLUTION TOPICAL at 11:51

## 2020-01-31 RX ADMIN — MAGNESIUM OXIDE 400 MG ORAL TABLET 400 MILLIGRAM(S): 241.3 TABLET ORAL at 17:35

## 2020-01-31 RX ADMIN — MAGNESIUM OXIDE 400 MG ORAL TABLET 400 MILLIGRAM(S): 241.3 TABLET ORAL at 09:10

## 2020-01-31 RX ADMIN — Medication 650 MILLIGRAM(S): at 09:28

## 2020-01-31 RX ADMIN — Medication 650 MILLIGRAM(S): at 09:30

## 2020-01-31 RX ADMIN — CEFTRIAXONE 100 MILLIGRAM(S): 500 INJECTION, POWDER, FOR SOLUTION INTRAMUSCULAR; INTRAVENOUS at 13:31

## 2020-01-31 RX ADMIN — GABAPENTIN 300 MILLIGRAM(S): 400 CAPSULE ORAL at 17:38

## 2020-01-31 RX ADMIN — LACTULOSE 10 GRAM(S): 10 SOLUTION ORAL at 11:52

## 2020-01-31 RX ADMIN — Medication 50 GRAM(S): at 11:51

## 2020-01-31 RX ADMIN — MAGNESIUM OXIDE 400 MG ORAL TABLET 400 MILLIGRAM(S): 241.3 TABLET ORAL at 11:52

## 2020-01-31 NOTE — PROGRESS NOTE ADULT - SUBJECTIVE AND OBJECTIVE BOX
SUBJECTIVE AND OBJECTIVE:  Weakness.  No acute events.    INTERVAL HPI/OVERNIGHT EVENTS:  Receiving Palliative RT.     DNR on chart:   Allergies    No Known Allergies    Intolerances    MEDICATIONS  (STANDING):  chlorhexidine 4% Liquid 1 Application(s) Topical daily  gabapentin 300 milliGRAM(s) Oral two times a day  lactulose Syrup 10 Gram(s) Oral every 6 hours  polyethylene glycol 3350 17 Gram(s) Oral daily  sodium chloride 0.9%. 1000 milliLiter(s) (100 mL/Hr) IV Continuous <Continuous>    MEDICATIONS  (PRN):  bisacodyl 5 milliGRAM(s) Oral every 12 hours PRN Constipation  ondansetron Injectable 4 milliGRAM(s) IV Push every 8 hours PRN Nausea and/or Vomiting      ITEMS UNCHECKED ARE NOT PRESENT    PRESENT SYMPTOMS: [ ]Unable to obtain due to poor mentation   Source if other than patient:  [ ]Family   [ ]Team     Pain (Impact on QOL):  None  Location:  Minimal acceptable level (0-10 scale):                   Aggravating factors:  Quality:  Radiation:  Severity (0-10 scale):    Timing:    Dyspnea:                           [ x]Mild [ ]Moderate [ ]Severe  Anxiety:                             [ x]Mild [ ]Moderate [ ]Severe  Fatigue:                             [ ]Mild [ x]Moderate [ ]Severe  Nausea:                             [ ]Mild [ ]Moderate [ ]Severe  Loss of appetite:              [x ]Mild [ ]Moderate [ ]Severe  Constipation:                    [ ]Mild [ ]Moderate [ ]Severe  Grief Present                    [ ] Yes  [x ] No   PAIN AD Score:	  [email or web address] (Ctrl + left click to view)    Other Symptoms:  [ ]All other review of systems negative     Karnofsky Performance Score/Palliative Performance Status Version 2:    50     %    http://palliative.info/resource_material/[email or web address].pdf  PHYSICAL EXAM:  Vital Signs Last 24 Hrs  T(C): 37.1 (03 Feb 2020 05:43), Max: 37.2 (02 Feb 2020 13:09)  T(F): 98.7 (03 Feb 2020 05:43), Max: 98.9 (02 Feb 2020 13:09)  HR: 86 (03 Feb 2020 10:10) (82 - 95)  BP: 110/68 (03 Feb 2020 10:10) (101/62 - 125/60)  BP(mean): --  RR: 18 (03 Feb 2020 10:10) (18 - 18)  SpO2: 98% (03 Feb 2020 10:10) (98% - 100%) I&O's Summary   GENERAL:  [x ]Alert  [x ]Oriented x  3 [ ]Lethargic  [x ]Cachexia  [ ]Unarousable  [ ]Verbal  [ ]Non-Verbal  Behavioral:   [ ] Anxiety  [ ] Delirium [ ] Agitation [x ] Other  HEENT:  [ ]Normal   [x ]Dry mouth   [ ]ET Tube/Trach  [ ]Oral lesions  PULMONARY:   [x]Clear [ ]Tachypnea  [ ]Audible excessive secretions   [ ]Rhonchi        [ ]Right [ ]Left [ ]Bilateral  [ ]Crackles        [ ]Right [ ]Left [ ]Bilateral  [ ]Wheezing     [ ]Right [ ]Left [ ]Bilateral  CARDIOVASCULAR:    [x ]Regular [ ]Irregular [ ]Tachy  [ ]Teja [ ]Murmur [ ]Other  GASTROINTESTINAL:  [x ]Soft  [ ]Distended   [x ]+BS  [ x]Non tender [ ]Tender  [ ]PEG [ ]OGT/ NGT   Last BM:    GENITOURINARY:  [ x]Normal [ ] Incontinent   [ ]Oliguria/Anuria   [ ]Sheldon  MUSCULOSKELETAL:   [ ]Normal   [x ]Weakness  [ ]Bed/Wheelchair bound [ ]Edema  NEUROLOGIC:   [ x]No focal deficits  [ ] Cognitive impairment  [ ] Dysphagia [ ]Dysarthria [ ] Paresis [ ]Other   SKIN:   [x ]Normal   [ ]Pressure ulcer(s)  [ ]Rash    CRITICAL CARE:  [ ] Shock Present  [ ]Septic [ ]Cardiogenic [ ]Neurologic [ ]Hypovolemic  [ ]  Vasopressors [ ]  Inotropes   [ ] Respiratory failure present  [ ] Acute  [ ] Chronic [ ] Hypoxic  [ ] Hypercarbic [ ] Other  [ ] Other organ failure     LABS:                        7.1    6.21  )-----------( 195      ( 03 Feb 2020 06:40 )             21.8   02-03    133<L>  |  98  |  18  ----------------------------<  95  4.0   |  20<L>  |  1.31<H>    Ca    9.0      03 Feb 2020 06:40  Phos  3.4     02-03  Mg     1.7     02-03          RADIOLOGY & ADDITIONAL STUDIES:    Protein Calorie Malnutrition Present: [ ] yes [ ] no  [ ] PPSV2 < or = 30%  [ ] significant weight loss [ ] poor nutritional intake [ ] anasarca [ ] catabolic state Albumin, Serum: 2.6 g/dL (01-29-20 @ 06:30)  Artificial Nutrition [ ]     REFERRALS:   [ ]Chaplaincy  [ ] Hospice  [ ]Child Life  [ ]Social Work  [ ]Case management [ ]Holistic Therapy   Goals of Care Document:

## 2020-01-31 NOTE — PROGRESS NOTE ADULT - ASSESSMENT
68 yo  with stage IV high-grade uterine carcinosarcoma dx 3/2019, s/p carbo/taxol 7 cycles followed by single agent carboplatin last dose 2019 [place] [facility] [place]    Patient initially admitted, found to have protruding uterine mass consistent with high grade uterine carcinosarcoma. CTAP at that time significant for metastatic lesions consistent with Stage IV disease.     She was found to be [place] 87[place]56 [place] [facility] [place] c/o lightheadedness/dizziness since this morning. She reports vaginal spotting and having passed a lemon sized clot yesterday with no further bleeding since. She reports vaginal discharge with foul odor for months. She endorses nausea/vomiting 2 days ago and again today at Dr. Garcia's office with 1x episode of NBNB emesis. Last BM 2 days ago. Passing flatus today.       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 Anemia due to blood loss. -likely 2/2 vaginal bleed in the setting of uterine cancer.    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   Hgb 8.3 today s/p PRBC on 20 now 7.6- will ask for RT - Onc eval for ?? RT    Seen by GYN Onc- Dr Bolton appreciated  "If significant vaginal bleeding, consider vaginal packing and radiation therapy   - Gyn Onc will continue to follow   Patient seen and evaluated, bleeding decreased, however she had persistent bleeding with final hg 5  Palliative chemo continuing, however, if bleeding returns, would recommend considering palliative RT to the pelvis, given large pelvic mass that is likely bleeding. Patient has never had RT in the past  Recommend rad onc consult ."

## 2020-02-01 LAB
ANION GAP SERPL CALC-SCNC: 13 MMO/L — SIGNIFICANT CHANGE UP (ref 7–14)
BASOPHILS # BLD AUTO: 0.01 K/UL — SIGNIFICANT CHANGE UP (ref 0–0.2)
BASOPHILS NFR BLD AUTO: 0.1 % — SIGNIFICANT CHANGE UP (ref 0–2)
BUN SERPL-MCNC: 16 MG/DL — SIGNIFICANT CHANGE UP (ref 7–23)
CALCIUM SERPL-MCNC: 8.8 MG/DL — SIGNIFICANT CHANGE UP (ref 8.4–10.5)
CHLORIDE SERPL-SCNC: 103 MMOL/L — SIGNIFICANT CHANGE UP (ref 98–107)
CO2 SERPL-SCNC: 21 MMOL/L — LOW (ref 22–31)
CREAT SERPL-MCNC: 1.34 MG/DL — HIGH (ref 0.5–1.3)
EOSINOPHIL # BLD AUTO: 0.02 K/UL — SIGNIFICANT CHANGE UP (ref 0–0.5)
EOSINOPHIL NFR BLD AUTO: 0.3 % — SIGNIFICANT CHANGE UP (ref 0–6)
GLUCOSE SERPL-MCNC: 93 MG/DL — SIGNIFICANT CHANGE UP (ref 70–99)
HCT VFR BLD CALC: 20.8 % — CRITICAL LOW (ref 34.5–45)
HCT VFR BLD CALC: 25.3 % — LOW (ref 34.5–45)
HGB BLD-MCNC: 6.6 G/DL — CRITICAL LOW (ref 11.5–15.5)
HGB BLD-MCNC: 7.9 G/DL — LOW (ref 11.5–15.5)
IMM GRANULOCYTES NFR BLD AUTO: 0.7 % — SIGNIFICANT CHANGE UP (ref 0–1.5)
LYMPHOCYTES # BLD AUTO: 1 K/UL — SIGNIFICANT CHANGE UP (ref 1–3.3)
LYMPHOCYTES # BLD AUTO: 13.4 % — SIGNIFICANT CHANGE UP (ref 13–44)
MAGNESIUM SERPL-MCNC: 1.8 MG/DL — SIGNIFICANT CHANGE UP (ref 1.6–2.6)
MCHC RBC-ENTMCNC: 29.2 PG — SIGNIFICANT CHANGE UP (ref 27–34)
MCHC RBC-ENTMCNC: 29.2 PG — SIGNIFICANT CHANGE UP (ref 27–34)
MCHC RBC-ENTMCNC: 31.2 % — LOW (ref 32–36)
MCHC RBC-ENTMCNC: 31.7 % — LOW (ref 32–36)
MCV RBC AUTO: 92 FL — SIGNIFICANT CHANGE UP (ref 80–100)
MCV RBC AUTO: 93.4 FL — SIGNIFICANT CHANGE UP (ref 80–100)
MONOCYTES # BLD AUTO: 0.63 K/UL — SIGNIFICANT CHANGE UP (ref 0–0.9)
MONOCYTES NFR BLD AUTO: 8.4 % — SIGNIFICANT CHANGE UP (ref 2–14)
NEUTROPHILS # BLD AUTO: 5.78 K/UL — SIGNIFICANT CHANGE UP (ref 1.8–7.4)
NEUTROPHILS NFR BLD AUTO: 77.1 % — HIGH (ref 43–77)
NRBC # FLD: 0 K/UL — SIGNIFICANT CHANGE UP (ref 0–0)
NRBC # FLD: 0 K/UL — SIGNIFICANT CHANGE UP (ref 0–0)
PHOSPHATE SERPL-MCNC: 3.3 MG/DL — SIGNIFICANT CHANGE UP (ref 2.5–4.5)
PLATELET # BLD AUTO: 185 K/UL — SIGNIFICANT CHANGE UP (ref 150–400)
PLATELET # BLD AUTO: 192 K/UL — SIGNIFICANT CHANGE UP (ref 150–400)
PMV BLD: 9.1 FL — SIGNIFICANT CHANGE UP (ref 7–13)
PMV BLD: 9.6 FL — SIGNIFICANT CHANGE UP (ref 7–13)
POTASSIUM SERPL-MCNC: 4 MMOL/L — SIGNIFICANT CHANGE UP (ref 3.5–5.3)
POTASSIUM SERPL-SCNC: 4 MMOL/L — SIGNIFICANT CHANGE UP (ref 3.5–5.3)
RBC # BLD: 2.26 M/UL — LOW (ref 3.8–5.2)
RBC # BLD: 2.71 M/UL — LOW (ref 3.8–5.2)
RBC # FLD: 16.3 % — HIGH (ref 10.3–14.5)
RBC # FLD: 17.5 % — HIGH (ref 10.3–14.5)
SODIUM SERPL-SCNC: 137 MMOL/L — SIGNIFICANT CHANGE UP (ref 135–145)
WBC # BLD: 7.12 K/UL — SIGNIFICANT CHANGE UP (ref 3.8–10.5)
WBC # BLD: 7.49 K/UL — SIGNIFICANT CHANGE UP (ref 3.8–10.5)
WBC # FLD AUTO: 7.12 K/UL — SIGNIFICANT CHANGE UP (ref 3.8–10.5)
WBC # FLD AUTO: 7.49 K/UL — SIGNIFICANT CHANGE UP (ref 3.8–10.5)

## 2020-02-01 PROCEDURE — 99232 SBSQ HOSP IP/OBS MODERATE 35: CPT

## 2020-02-01 RX ORDER — ACETAMINOPHEN 500 MG
650 TABLET ORAL ONCE
Refills: 0 | Status: COMPLETED | OUTPATIENT
Start: 2020-02-01 | End: 2020-02-01

## 2020-02-01 RX ADMIN — Medication 650 MILLIGRAM(S): at 21:31

## 2020-02-01 RX ADMIN — CHLORHEXIDINE GLUCONATE 1 APPLICATION(S): 213 SOLUTION TOPICAL at 11:44

## 2020-02-01 RX ADMIN — ONDANSETRON 4 MILLIGRAM(S): 8 TABLET, FILM COATED ORAL at 13:26

## 2020-02-01 RX ADMIN — MAGNESIUM OXIDE 400 MG ORAL TABLET 400 MILLIGRAM(S): 241.3 TABLET ORAL at 08:56

## 2020-02-01 RX ADMIN — CEFTRIAXONE 100 MILLIGRAM(S): 500 INJECTION, POWDER, FOR SOLUTION INTRAMUSCULAR; INTRAVENOUS at 12:58

## 2020-02-01 RX ADMIN — Medication 650 MILLIGRAM(S): at 22:35

## 2020-02-01 NOTE — PROGRESS NOTE ADULT - ASSESSMENT
70 yo  with stage IV high-grade uterine carcinosarcoma dx 3/2019, s/p carbo/taxol 7 cycles followed by single agent carboplatin last dose 2019 presenting for hypotension from [facility] [place].    Patient initially admitted, found to have protruding uterine mass consistent with high grade uterine carcinosarcoma. CTAP at that time significant for metastatic lesions consistent with Stage IV disease.     She was found to be hypotensive to 87/56 at [facility] [place] and c/o lightheadedness/dizziness since this morning. She reports vaginal spotting and having passed a lemon sized clot yesterday with no further bleeding since. She reports vaginal discharge with foul odor for months. She endorses nausea/vomiting 2 days ago and again today at Dr. Garcia's office with 1x episode of NBNB emesis. Last BM 2 days ago. Passing flatus today.       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 Anemia due to blood loss. -likely 2/2 vaginal bleed in the setting of uterine cancer.    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   Hgb 8.3 today s/p PRBC on 20 now 7.6- will ask for RT - Onc eval for ?? RT    Seen by GYN Onc- Dr Bolton appreciated  "If significant vaginal bleeding, consider vaginal packing and radiation therapy   - Gyn Onc will continue to follow   Patient seen and evaluated, bleeding decreased, however she had persistent bleeding with final hg 5  Palliative chemo continuing, however, if bleeding returns, would recommend considering palliative RT to the pelvis, given large pelvic mass that is likely bleeding. Patient has never had RT in the past  Recommend rad onc consult ."

## 2020-02-01 NOTE — PROGRESS NOTE ADULT - SUBJECTIVE AND OBJECTIVE BOX
Patient is a 69y old  Female who presents with a chief complaint of Sent from Onc office for hypotension (31 Jan 2020 13:23)      SUBJECTIVE / OVERNIGHT EVENTS: Feeling a bit "woozy" this morning, but somewhat better s/p PRBCs.    MEDICATIONS  (STANDING):  chlorhexidine 4% Liquid 1 Application(s) Topical daily  gabapentin 300 milliGRAM(s) Oral two times a day  lactulose Syrup 10 Gram(s) Oral every 6 hours  polyethylene glycol 3350 17 Gram(s) Oral daily  sodium chloride 0.9%. 1000 milliLiter(s) (100 mL/Hr) IV Continuous <Continuous>    MEDICATIONS  (PRN):  bisacodyl 5 milliGRAM(s) Oral every 12 hours PRN Constipation  ondansetron Injectable 4 milliGRAM(s) IV Push every 8 hours PRN Nausea and/or Vomiting      CAPILLARY BLOOD GLUCOSE        I&O's Summary      PHYSICAL EXAM:  Vital Signs Last 24 Hrs  T(C): 37.6 (01 Feb 2020 06:21), Max: 37.6 (31 Jan 2020 22:09)  T(F): 99.6 (01 Feb 2020 06:21), Max: 99.7 (31 Jan 2020 22:09)  HR: 93 (01 Feb 2020 06:21) (90 - 102)  BP: 124/73 (01 Feb 2020 06:21) (97/58 - 124/73)  BP(mean): --  RR: 18 (01 Feb 2020 06:21) (17 - 18)  SpO2: 100% (01 Feb 2020 06:21) (98% - 100%)  CONSTITUTIONAL: NAD, well-developed, well-groomed  EYES: PERRLA; conjunctiva and sclera clear  ENMT: Moist oral mucosa, no pharyngeal injection or exudates; normal dentition  NECK: Supple, no palpable masses; no thyromegaly  RESPIRATORY: Normal respiratory effort; lungs are clear to auscultation bilaterally  CARDIOVASCULAR: Regular rate and rhythm, normal S1 and S2, no murmur/rub/gallop; No lower extremity edema; Peripheral pulses are 2+ bilaterally  ABDOMEN: Nontender to palpation, normoactive bowel sounds, no rebound/guarding; No hepatosplenomegaly      LABS:                        7.9    7.49  )-----------( 192      ( 01 Feb 2020 08:30 )             25.3     02-01    137  |  103  |  16  ----------------------------<  93  4.0   |  21<L>  |  1.34<H>    Ca    8.8      01 Feb 2020 08:30  Phos  3.3     02-01  Mg     1.8     02-01                Culture - Blood (collected 29 Jan 2020 23:11)  Source: BLOOD VENOUS  Preliminary Report (31 Jan 2020 23:11):    NO ORGANISMS ISOLATED    NO ORGANISMS ISOLATED AT 48 HRS.    Culture - Blood (collected 29 Jan 2020 23:11)  Source: BLOOD PERIPHERAL  Preliminary Report (31 Jan 2020 23:11):    NO ORGANISMS ISOLATED    NO ORGANISMS ISOLATED AT 48 HRS.        RADIOLOGY & ADDITIONAL TESTS:  Results Reviewed:   Imaging Personally Reviewed:  Electrocardiogram Personally Reviewed:    COORDINATION OF CARE:  Care Discussed with Consultants/Other Providers [Y/N]:  Prior or Outpatient Records Reviewed [Y/N]:

## 2020-02-02 LAB
ANION GAP SERPL CALC-SCNC: 13 MMO/L — SIGNIFICANT CHANGE UP (ref 7–14)
BASOPHILS # BLD AUTO: 0.01 K/UL — SIGNIFICANT CHANGE UP (ref 0–0.2)
BASOPHILS NFR BLD AUTO: 0.1 % — SIGNIFICANT CHANGE UP (ref 0–2)
BLD GP AB SCN SERPL QL: NEGATIVE — SIGNIFICANT CHANGE UP
BUN SERPL-MCNC: 20 MG/DL — SIGNIFICANT CHANGE UP (ref 7–23)
CALCIUM SERPL-MCNC: 9.2 MG/DL — SIGNIFICANT CHANGE UP (ref 8.4–10.5)
CHLORIDE SERPL-SCNC: 103 MMOL/L — SIGNIFICANT CHANGE UP (ref 98–107)
CO2 SERPL-SCNC: 22 MMOL/L — SIGNIFICANT CHANGE UP (ref 22–31)
CREAT SERPL-MCNC: 1.4 MG/DL — HIGH (ref 0.5–1.3)
EOSINOPHIL # BLD AUTO: 0.03 K/UL — SIGNIFICANT CHANGE UP (ref 0–0.5)
EOSINOPHIL NFR BLD AUTO: 0.4 % — SIGNIFICANT CHANGE UP (ref 0–6)
GLUCOSE SERPL-MCNC: 96 MG/DL — SIGNIFICANT CHANGE UP (ref 70–99)
HCT VFR BLD CALC: 20.7 % — CRITICAL LOW (ref 34.5–45)
HCT VFR BLD CALC: 21.8 % — LOW (ref 34.5–45)
HGB BLD-MCNC: 6.7 G/DL — CRITICAL LOW (ref 11.5–15.5)
HGB BLD-MCNC: 7.4 G/DL — LOW (ref 11.5–15.5)
IMM GRANULOCYTES NFR BLD AUTO: 1.5 % — SIGNIFICANT CHANGE UP (ref 0–1.5)
LYMPHOCYTES # BLD AUTO: 1.09 K/UL — SIGNIFICANT CHANGE UP (ref 1–3.3)
LYMPHOCYTES # BLD AUTO: 15.9 % — SIGNIFICANT CHANGE UP (ref 13–44)
MAGNESIUM SERPL-MCNC: 1.8 MG/DL — SIGNIFICANT CHANGE UP (ref 1.6–2.6)
MCHC RBC-ENTMCNC: 29.4 PG — SIGNIFICANT CHANGE UP (ref 27–34)
MCHC RBC-ENTMCNC: 30.3 PG — SIGNIFICANT CHANGE UP (ref 27–34)
MCHC RBC-ENTMCNC: 32.4 % — SIGNIFICANT CHANGE UP (ref 32–36)
MCHC RBC-ENTMCNC: 33.9 % — SIGNIFICANT CHANGE UP (ref 32–36)
MCV RBC AUTO: 89.3 FL — SIGNIFICANT CHANGE UP (ref 80–100)
MCV RBC AUTO: 90.8 FL — SIGNIFICANT CHANGE UP (ref 80–100)
MONOCYTES # BLD AUTO: 0.57 K/UL — SIGNIFICANT CHANGE UP (ref 0–0.9)
MONOCYTES NFR BLD AUTO: 8.3 % — SIGNIFICANT CHANGE UP (ref 2–14)
NEUTROPHILS # BLD AUTO: 5.06 K/UL — SIGNIFICANT CHANGE UP (ref 1.8–7.4)
NEUTROPHILS NFR BLD AUTO: 73.8 % — SIGNIFICANT CHANGE UP (ref 43–77)
NRBC # FLD: 0 K/UL — SIGNIFICANT CHANGE UP (ref 0–0)
NRBC # FLD: 0.02 K/UL — SIGNIFICANT CHANGE UP (ref 0–0)
PHOSPHATE SERPL-MCNC: 3.3 MG/DL — SIGNIFICANT CHANGE UP (ref 2.5–4.5)
PLATELET # BLD AUTO: 174 K/UL — SIGNIFICANT CHANGE UP (ref 150–400)
PLATELET # BLD AUTO: 175 K/UL — SIGNIFICANT CHANGE UP (ref 150–400)
PMV BLD: 9.4 FL — SIGNIFICANT CHANGE UP (ref 7–13)
PMV BLD: 9.4 FL — SIGNIFICANT CHANGE UP (ref 7–13)
POTASSIUM SERPL-MCNC: 4.2 MMOL/L — SIGNIFICANT CHANGE UP (ref 3.5–5.3)
POTASSIUM SERPL-SCNC: 4.2 MMOL/L — SIGNIFICANT CHANGE UP (ref 3.5–5.3)
RBC # BLD: 2.28 M/UL — LOW (ref 3.8–5.2)
RBC # BLD: 2.44 M/UL — LOW (ref 3.8–5.2)
RBC # FLD: 16.1 % — HIGH (ref 10.3–14.5)
RBC # FLD: 16.5 % — HIGH (ref 10.3–14.5)
RH IG SCN BLD-IMP: POSITIVE — SIGNIFICANT CHANGE UP
SODIUM SERPL-SCNC: 138 MMOL/L — SIGNIFICANT CHANGE UP (ref 135–145)
WBC # BLD: 5.85 K/UL — SIGNIFICANT CHANGE UP (ref 3.8–10.5)
WBC # BLD: 6.86 K/UL — SIGNIFICANT CHANGE UP (ref 3.8–10.5)
WBC # FLD AUTO: 5.85 K/UL — SIGNIFICANT CHANGE UP (ref 3.8–10.5)
WBC # FLD AUTO: 6.86 K/UL — SIGNIFICANT CHANGE UP (ref 3.8–10.5)

## 2020-02-02 PROCEDURE — 99232 SBSQ HOSP IP/OBS MODERATE 35: CPT

## 2020-02-02 RX ADMIN — CHLORHEXIDINE GLUCONATE 1 APPLICATION(S): 213 SOLUTION TOPICAL at 11:48

## 2020-02-02 NOTE — PROGRESS NOTE ADULT - SUBJECTIVE AND OBJECTIVE BOX
Patient is a 69y old  Female who presents with a chief complaint of Sent from Onc office for hypotension (01 Feb 2020 13:04)      SUBJECTIVE / OVERNIGHT EVENTS: Pt states she continued to feel "woozy" over the rest of the day yesterday. She denies any bleeding or passage of clots. This morning, she feels "about the same."     MEDICATIONS  (STANDING):  chlorhexidine 4% Liquid 1 Application(s) Topical daily  gabapentin 300 milliGRAM(s) Oral two times a day  lactulose Syrup 10 Gram(s) Oral every 6 hours  polyethylene glycol 3350 17 Gram(s) Oral daily  sodium chloride 0.9%. 1000 milliLiter(s) (100 mL/Hr) IV Continuous <Continuous>    MEDICATIONS  (PRN):  bisacodyl 5 milliGRAM(s) Oral every 12 hours PRN Constipation  ondansetron Injectable 4 milliGRAM(s) IV Push every 8 hours PRN Nausea and/or Vomiting      CAPILLARY BLOOD GLUCOSE        I&O's Summary      PHYSICAL EXAM:  Vital Signs Last 24 Hrs  T(C): 37.2 (02 Feb 2020 06:14), Max: 38.1 (01 Feb 2020 20:58)  T(F): 98.9 (02 Feb 2020 06:14), Max: 100.5 (01 Feb 2020 20:58)  HR: 92 (02 Feb 2020 06:14) (91 - 104)  BP: 100/64 (02 Feb 2020 06:14) (92/54 - 113/66)  BP(mean): --  RR: 16 (02 Feb 2020 06:14) (16 - 18)  SpO2: 98% (02 Feb 2020 06:14) (98% - 100%)  CONSTITUTIONAL: NAD, well-developed, well-groomed  EYES: PERRLA; conjunctiva and sclera clear  ENMT: Moist oral mucosa, no pharyngeal injection or exudates; normal dentition  NECK: Supple, no palpable masses; no thyromegaly  RESPIRATORY: Normal respiratory effort; lungs are clear to auscultation bilaterally  CARDIOVASCULAR: Regular rate and rhythm, normal S1 and S2, no murmur/rub/gallop; No lower extremity edema; Peripheral pulses are 2+ bilaterally  ABDOMEN: Nontender to palpation, normoactive bowel sounds, no rebound/guarding; No hepatosplenomegaly      LABS:                        6.7    6.86  )-----------( 174      ( 02 Feb 2020 06:30 )             20.7     02-02    138  |  103  |  20  ----------------------------<  96  4.2   |  22  |  1.40<H>    Ca    9.2      02 Feb 2020 06:30  Phos  3.3     02-02  Mg     1.8     02-02                  RADIOLOGY & ADDITIONAL TESTS:  Results Reviewed:   Imaging Personally Reviewed:  Electrocardiogram Personally Reviewed:    COORDINATION OF CARE:  Care Discussed with Consultants/Other Providers [Y/N]:  Prior or Outpatient Records Reviewed [Y/N]:

## 2020-02-02 NOTE — PROGRESS NOTE ADULT - ASSESSMENT
68 yo  with stage IV high-grade uterine carcinosarcoma dx 3/2019, s/p carbo/taxol 7 cycles followed by single agent carboplatin last dose 2019 [place] [facility] [place]    Patient initially admitted, found to have protruding uterine mass consistent with high grade uterine carcinosarcoma. CTAP at that time significant for metastatic lesions consistent with Stage IV disease.     She was found to be hypotensive to 87/56 [place] [facility] [place] and c/o lightheadedness/dizziness since this morning. She reports vaginal spotting and having passed a lemon sized clot yesterday with no further bleeding since. She reports vaginal discharge with foul odor for months. She endorses nausea/vomiting 2 days ago and again today at Dr. Garcia's office with 1x episode of NBNB emesis. Last BM 2 days ago. Passing flatus today.       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 Anemia due to blood loss. -likely 2/2 vaginal bleed in the setting of uterine cancer.    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   Hgb 8.3 today s/p PRBC on 20 now 7.6- will ask for RT - Onc eval for ?? RT    Seen by GYN Onc- Dr Bolton appreciated  "If significant vaginal bleeding, consider vaginal packing and radiation therapy   - Gyn Onc will continue to follow   Patient seen and evaluated, bleeding decreased, however she had persistent bleeding with final hg 5  Palliative chemo continuing, however, if bleeding returns, would recommend considering palliative RT to the pelvis, given large pelvic mass that is likely bleeding. Patient has never had RT in the past  Recommend rad onc consult ."

## 2020-02-03 LAB
ANION GAP SERPL CALC-SCNC: 15 MMO/L — HIGH (ref 7–14)
BACTERIA BLD CULT: SIGNIFICANT CHANGE UP
BACTERIA BLD CULT: SIGNIFICANT CHANGE UP
BASOPHILS # BLD AUTO: 0.02 K/UL — SIGNIFICANT CHANGE UP (ref 0–0.2)
BASOPHILS NFR BLD AUTO: 0.3 % — SIGNIFICANT CHANGE UP (ref 0–2)
BUN SERPL-MCNC: 18 MG/DL — SIGNIFICANT CHANGE UP (ref 7–23)
CALCIUM SERPL-MCNC: 9 MG/DL — SIGNIFICANT CHANGE UP (ref 8.4–10.5)
CHLORIDE SERPL-SCNC: 98 MMOL/L — SIGNIFICANT CHANGE UP (ref 98–107)
CO2 SERPL-SCNC: 20 MMOL/L — LOW (ref 22–31)
CREAT SERPL-MCNC: 1.31 MG/DL — HIGH (ref 0.5–1.3)
EOSINOPHIL # BLD AUTO: 0.04 K/UL — SIGNIFICANT CHANGE UP (ref 0–0.5)
EOSINOPHIL NFR BLD AUTO: 0.6 % — SIGNIFICANT CHANGE UP (ref 0–6)
GLUCOSE SERPL-MCNC: 95 MG/DL — SIGNIFICANT CHANGE UP (ref 70–99)
HCT VFR BLD CALC: 20.2 % — CRITICAL LOW (ref 34.5–45)
HCT VFR BLD CALC: 21.8 % — LOW (ref 34.5–45)
HGB BLD-MCNC: 6.6 G/DL — CRITICAL LOW (ref 11.5–15.5)
HGB BLD-MCNC: 7.1 G/DL — LOW (ref 11.5–15.5)
IMM GRANULOCYTES NFR BLD AUTO: 1.8 % — HIGH (ref 0–1.5)
LYMPHOCYTES # BLD AUTO: 1.45 K/UL — SIGNIFICANT CHANGE UP (ref 1–3.3)
LYMPHOCYTES # BLD AUTO: 23.3 % — SIGNIFICANT CHANGE UP (ref 13–44)
MAGNESIUM SERPL-MCNC: 1.7 MG/DL — SIGNIFICANT CHANGE UP (ref 1.6–2.6)
MCHC RBC-ENTMCNC: 29.9 PG — SIGNIFICANT CHANGE UP (ref 27–34)
MCHC RBC-ENTMCNC: 30.2 PG — SIGNIFICANT CHANGE UP (ref 27–34)
MCHC RBC-ENTMCNC: 32.6 % — SIGNIFICANT CHANGE UP (ref 32–36)
MCHC RBC-ENTMCNC: 32.7 % — SIGNIFICANT CHANGE UP (ref 32–36)
MCV RBC AUTO: 91.4 FL — SIGNIFICANT CHANGE UP (ref 80–100)
MCV RBC AUTO: 92.8 FL — SIGNIFICANT CHANGE UP (ref 80–100)
MONOCYTES # BLD AUTO: 0.56 K/UL — SIGNIFICANT CHANGE UP (ref 0–0.9)
MONOCYTES NFR BLD AUTO: 9 % — SIGNIFICANT CHANGE UP (ref 2–14)
NEUTROPHILS # BLD AUTO: 4.03 K/UL — SIGNIFICANT CHANGE UP (ref 1.8–7.4)
NEUTROPHILS NFR BLD AUTO: 65 % — SIGNIFICANT CHANGE UP (ref 43–77)
NRBC # FLD: 0 K/UL — SIGNIFICANT CHANGE UP (ref 0–0)
NRBC # FLD: 0 K/UL — SIGNIFICANT CHANGE UP (ref 0–0)
PHOSPHATE SERPL-MCNC: 3.4 MG/DL — SIGNIFICANT CHANGE UP (ref 2.5–4.5)
PLATELET # BLD AUTO: 187 K/UL — SIGNIFICANT CHANGE UP (ref 150–400)
PLATELET # BLD AUTO: 195 K/UL — SIGNIFICANT CHANGE UP (ref 150–400)
PMV BLD: 9.6 FL — SIGNIFICANT CHANGE UP (ref 7–13)
PMV BLD: 9.9 FL — SIGNIFICANT CHANGE UP (ref 7–13)
POTASSIUM SERPL-MCNC: 4 MMOL/L — SIGNIFICANT CHANGE UP (ref 3.5–5.3)
POTASSIUM SERPL-SCNC: 4 MMOL/L — SIGNIFICANT CHANGE UP (ref 3.5–5.3)
RBC # BLD: 2.21 M/UL — LOW (ref 3.8–5.2)
RBC # BLD: 2.35 M/UL — LOW (ref 3.8–5.2)
RBC # FLD: 15.9 % — HIGH (ref 10.3–14.5)
RBC # FLD: 16.1 % — HIGH (ref 10.3–14.5)
SODIUM SERPL-SCNC: 133 MMOL/L — LOW (ref 135–145)
WBC # BLD: 5.25 K/UL — SIGNIFICANT CHANGE UP (ref 3.8–10.5)
WBC # BLD: 6.21 K/UL — SIGNIFICANT CHANGE UP (ref 3.8–10.5)
WBC # FLD AUTO: 5.25 K/UL — SIGNIFICANT CHANGE UP (ref 3.8–10.5)
WBC # FLD AUTO: 6.21 K/UL — SIGNIFICANT CHANGE UP (ref 3.8–10.5)

## 2020-02-03 PROCEDURE — 99232 SBSQ HOSP IP/OBS MODERATE 35: CPT

## 2020-02-03 PROCEDURE — 99233 SBSQ HOSP IP/OBS HIGH 50: CPT

## 2020-02-03 PROCEDURE — 77427 RADIATION TX MANAGEMENT X5: CPT

## 2020-02-03 PROCEDURE — 99232 SBSQ HOSP IP/OBS MODERATE 35: CPT | Mod: GC

## 2020-02-03 RX ORDER — HYDROMORPHONE HYDROCHLORIDE 2 MG/ML
0.5 INJECTION INTRAMUSCULAR; INTRAVENOUS; SUBCUTANEOUS ONCE
Refills: 0 | Status: DISCONTINUED | OUTPATIENT
Start: 2020-02-03 | End: 2020-02-03

## 2020-02-03 RX ADMIN — HYDROMORPHONE HYDROCHLORIDE 0.5 MILLIGRAM(S): 2 INJECTION INTRAMUSCULAR; INTRAVENOUS; SUBCUTANEOUS at 10:27

## 2020-02-03 RX ADMIN — HYDROMORPHONE HYDROCHLORIDE 0.5 MILLIGRAM(S): 2 INJECTION INTRAMUSCULAR; INTRAVENOUS; SUBCUTANEOUS at 23:15

## 2020-02-03 RX ADMIN — HYDROMORPHONE HYDROCHLORIDE 0.5 MILLIGRAM(S): 2 INJECTION INTRAMUSCULAR; INTRAVENOUS; SUBCUTANEOUS at 23:30

## 2020-02-03 RX ADMIN — CHLORHEXIDINE GLUCONATE 1 APPLICATION(S): 213 SOLUTION TOPICAL at 11:54

## 2020-02-03 RX ADMIN — HYDROMORPHONE HYDROCHLORIDE 0.5 MILLIGRAM(S): 2 INJECTION INTRAMUSCULAR; INTRAVENOUS; SUBCUTANEOUS at 10:12

## 2020-02-03 NOTE — PROGRESS NOTE ADULT - SUBJECTIVE AND OBJECTIVE BOX
SUBJECTIVE AND OBJECTIVE:  Weakness.  No acute events.    INTERVAL HPI/OVERNIGHT EVENTS:  Receiving Palliative RT.     DNR on chart:   Allergies    No Known Allergies    Intolerances    MEDICATIONS  (STANDING):  chlorhexidine 4% Liquid 1 Application(s) Topical daily  gabapentin 300 milliGRAM(s) Oral two times a day  lactulose Syrup 10 Gram(s) Oral every 6 hours  polyethylene glycol 3350 17 Gram(s) Oral daily  sodium chloride 0.9%. 1000 milliLiter(s) (100 mL/Hr) IV Continuous <Continuous>    MEDICATIONS  (PRN):  bisacodyl 5 milliGRAM(s) Oral every 12 hours PRN Constipation  ondansetron Injectable 4 milliGRAM(s) IV Push every 8 hours PRN Nausea and/or Vomiting      ITEMS UNCHECKED ARE NOT PRESENT    PRESENT SYMPTOMS: [ ]Unable to obtain due to poor mentation   Source if other than patient:  [ ]Family   [ ]Team     Pain (Impact on QOL):  None  Location:  Minimal acceptable level (0-10 scale):                   Aggravating factors:  Quality:  Radiation:  Severity (0-10 scale):    Timing:    Dyspnea:                           [ x]Mild [ ]Moderate [ ]Severe  Anxiety:                             [ x]Mild [ ]Moderate [ ]Severe  Fatigue:                             [ ]Mild [ x]Moderate [ ]Severe  Nausea:                             [ ]Mild [ ]Moderate [ ]Severe  Loss of appetite:              [x ]Mild [ ]Moderate [ ]Severe  Constipation:                    [ ]Mild [ ]Moderate [ ]Severe  Grief Present                    [ ] Yes  [x ] No   PAIN AD Score:	  [email or web address] (Ctrl + left click to view)    Other Symptoms:  [ ]All other review of systems negative     Karnofsky Performance Score/Palliative Performance Status Version 2:    50     %    http://palliative.info/resource_material/[email or web address]  PHYSICAL EXAM:  Vital Signs Last 24 Hrs  T(C): 37.1 (03 Feb 2020 05:43), Max: 37.2 (02 Feb 2020 13:09)  T(F): 98.7 (03 Feb 2020 05:43), Max: 98.9 (02 Feb 2020 13:09)  HR: 86 (03 Feb 2020 10:10) (82 - 95)  BP: 110/68 (03 Feb 2020 10:10) (101/62 - 125/60)  BP(mean): --  RR: 18 (03 Feb 2020 10:10) (18 - 18)  SpO2: 98% (03 Feb 2020 10:10) (98% - 100%) I&O's Summary   GENERAL:  [x ]Alert  [x ]Oriented x  3 [ ]Lethargic  [x ]Cachexia  [ ]Unarousable  [ ]Verbal  [ ]Non-Verbal  Behavioral:   [ ] Anxiety  [ ] Delirium [ ] Agitation [x ] Other  HEENT:  [ ]Normal   [x ]Dry mouth   [ ]ET Tube/Trach  [ ]Oral lesions  PULMONARY:   [x]Clear [ ]Tachypnea  [ ]Audible excessive secretions   [ ]Rhonchi        [ ]Right [ ]Left [ ]Bilateral  [ ]Crackles        [ ]Right [ ]Left [ ]Bilateral  [ ]Wheezing     [ ]Right [ ]Left [ ]Bilateral  CARDIOVASCULAR:    [x ]Regular [ ]Irregular [ ]Tachy  [ ]Teja [ ]Murmur [ ]Other  GASTROINTESTINAL:  [x ]Soft  [ ]Distended   [x ]+BS  [ x]Non tender [ ]Tender  [ ]PEG [ ]OGT/ NGT   Last BM:    GENITOURINARY:  [ x]Normal [ ] Incontinent   [ ]Oliguria/Anuria   [ ]Sheldon  MUSCULOSKELETAL:   [ ]Normal   [x ]Weakness  [ ]Bed/Wheelchair bound [ ]Edema  NEUROLOGIC:   [ x]No focal deficits  [ ] Cognitive impairment  [ ] Dysphagia [ ]Dysarthria [ ] Paresis [ ]Other   SKIN:   [x ]Normal   [ ]Pressure ulcer(s)  [ ]Rash    CRITICAL CARE:  [ ] Shock Present  [ ]Septic [ ]Cardiogenic [ ]Neurologic [ ]Hypovolemic  [ ]  Vasopressors [ ]  Inotropes   [ ] Respiratory failure present  [ ] Acute  [ ] Chronic [ ] Hypoxic  [ ] Hypercarbic [ ] Other  [ ] Other organ failure     LABS:                        7.1    6.21  )-----------( 195      ( 03 Feb 2020 06:40 )             21.8   02-03    133<L>  |  98  |  18  ----------------------------<  95  4.0   |  20<L>  |  1.31<H>    Ca    9.0      03 Feb 2020 06:40  Phos  3.4     02-03  Mg     1.7     02-03          RADIOLOGY & ADDITIONAL STUDIES:    Protein Calorie Malnutrition Present: [ ] yes [ ] no  [ ] PPSV2 < or = 30%  [ ] significant weight loss [ ] poor nutritional intake [ ] anasarca [ ] catabolic state Albumin, Serum: 2.6 g/dL (01-29-20 @ 06:30)  Artificial Nutrition [ ]     REFERRALS:   [ ]Chaplaincy  [ ] Hospice  [ ]Child Life  [ ]Social Work  [ ]Case management [ ]Holistic Therapy   Goals of Care Document:

## 2020-02-03 NOTE — DIETITIAN INITIAL EVALUATION ADULT. - PROBLEM SELECTOR PLAN 5
-Stage IV uterine cancer on Carboplatin, last dose 12/4.    -s/p C7 with carboplatin/Taxol, now on single agent  -f/u onc recs  -f/u gyn onc recs

## 2020-02-03 NOTE — DIETITIAN INITIAL EVALUATION ADULT. - OTHER INFO
70 y/o Female with medical history of HTN, Stage IV uterine carcinosarcoma found to have vaginal bleeding. Patient endorses good appetite and po intake >75% at this time-same confirmed with RN. Patient denies any nausea/vomiting/diarrhea/constipation or difficulty chewing and swallowing. Allergic to FIsh-same implemented. Patient denies any weight changes. Patient receiving RT till 2/5. Encouraged continue good po intakes of nutrient and protein dense foods. RDN remains available, patient made aware.

## 2020-02-03 NOTE — DIETITIAN INITIAL EVALUATION ADULT. - PROBLEM SELECTOR PLAN 7
Transitions of Care Status:  1.  Name of PCP: Dr. Asiya Garcia  2.  PCP Contacted on Admission: [ ] Y    [x ] N    3.  PCP contacted at Discharge: [ ] Y    [ ] N    [ ] N/A  4.  Post-Discharge Appointment Date and Location:  5.  Summary of Handoff given to PCP:

## 2020-02-03 NOTE — PROGRESS NOTE ADULT - ASSESSMENT
68 yo  with stage IV high-grade uterine carcinosarcoma dx 3/2019, s/p carbo/taxol 7 cycles followed by single agent carboplatin last dose 2019 [facility]    Stage IV uterine carcinosarcoma  - CT C/A/P WO showing increased size of endometrial and cervical mass along with a new left para-aortic mass consistent with metastatic disease  - bleeding coming from mass, no GYN intervention at this time.    - s/p 2/5 fractions of RT per rad onc.  Today will be the 3rd.  - if continues to bleed, will need to consider vascular imaging to see if IR intervention is possible  - no plans for inpatient chemotherapy  - bowel regimen  - transfuse if hg < 7.0 or pt symptomatic  - outpatient f/u with Dr. Jose Morillo DO  Hematology/Oncology Fellow, PGY5  Pager: [phone]

## 2020-02-03 NOTE — DIETITIAN INITIAL EVALUATION ADULT. - PERTINENT MEDS FT
MEDICATIONS  (STANDING):  chlorhexidine 4% Liquid 1 Application(s) Topical daily  gabapentin 300 milliGRAM(s) Oral two times a day  lactulose Syrup 10 Gram(s) Oral every 6 hours  polyethylene glycol 3350 17 Gram(s) Oral daily  sodium chloride 0.9%. 1000 milliLiter(s) (100 mL/Hr) IV Continuous <Continuous>    MEDICATIONS  (PRN):  bisacodyl 5 milliGRAM(s) Oral every 12 hours PRN Constipation  ondansetron Injectable 4 milliGRAM(s) IV Push every 8 hours PRN Nausea and/or Vomiting

## 2020-02-03 NOTE — PROGRESS NOTE ADULT - SUBJECTIVE AND OBJECTIVE BOX
Afebrile overnight.  Hg 7.1 this AM.     Vital Signs Last 24 Hrs  T(C): 37.1 (03 Feb 2020 05:43), Max: 37.2 (02 Feb 2020 13:09)  T(F): 98.7 (03 Feb 2020 05:43), Max: 98.9 (02 Feb 2020 13:09)  HR: 95 (03 Feb 2020 05:43) (82 - 95)  BP: 107/65 (03 Feb 2020 05:43) (101/62 - 125/60)  BP(mean): --  RR: 18 (03 Feb 2020 05:43) (18 - 18)  SpO2: 98% (03 Feb 2020 05:43) (98% - 100%)  PHYSICAL EXAM:    GENERAL: NAD, AAOx3   HEAD:  NC/AT  EYES: EOMI, PERRLA, no scleral icterus  HEENT: Moist mucous membranes  LUNG: Clear to auscultation bilaterally; No rales, rhonchi, wheezing, or rubs  HEART: RRR; No murmurs, rubs, or gallops  ABDOMEN: +BS, ST/ND/NT  EXTREMITIES:  2+ Peripheral Pulses, No clubbing, cyanosis, or edema  LAD: no palpable adenopathy  02-03    133<L>  |  98  |  18  ----------------------------<  95  4.0   |  20<L>  |  1.31<H>    Ca    9.0      03 Feb 2020 06:40  Phos  3.4     02-03  Mg     1.7     02-03        CBC Full  -  ( 03 Feb 2020 06:40 )  WBC Count : 6.21 K/uL  RBC Count : 2.35 M/uL  Hemoglobin : 7.1 g/dL  Hematocrit : 21.8 %  Platelet Count - Automated : 195 K/uL  Mean Cell Volume : 92.8 fL  Mean Cell Hemoglobin : 30.2 pg  Mean Cell Hemoglobin Concentration : 32.6 %  Auto Neutrophil # : 4.03 K/uL  Auto Lymphocyte # : 1.45 K/uL  Auto Monocyte # : 0.56 K/uL  Auto Eosinophil # : 0.04 K/uL  Auto Basophil # : 0.02 K/uL  Auto Neutrophil % : 65.0 %  Auto Lymphocyte % : 23.3 %  Auto Monocyte % : 9.0 %  Auto Eosinophil % : 0.6 %  Auto Basophil % : 0.3 %

## 2020-02-03 NOTE — PROGRESS NOTE ADULT - SUBJECTIVE AND OBJECTIVE BOX
Patient is a 69y old  Female who presents with a chief complaint of Sent from Onc office for hypotension (03 Feb 2020 09:33)      SUBJECTIVE / OVERNIGHT EVENTS:  patients note 8/10 pelvic pain after RT- patient will get iv pain medication- now  patient notes passing lots of clots when she goes to bathroom    MEDICATIONS  (STANDING):  chlorhexidine 4% Liquid 1 Application(s) Topical daily  gabapentin 300 milliGRAM(s) Oral two times a day  lactulose Syrup 10 Gram(s) Oral every 6 hours  polyethylene glycol 3350 17 Gram(s) Oral daily  sodium chloride 0.9%. 1000 milliLiter(s) (100 mL/Hr) IV Continuous <Continuous>    MEDICATIONS  (PRN):  bisacodyl 5 milliGRAM(s) Oral every 12 hours PRN Constipation  ondansetron Injectable 4 milliGRAM(s) IV Push every 8 hours PRN Nausea and/or Vomiting      Vital Signs Last 24 Hrs  T(C): 37.1 (03 Feb 2020 14:34), Max: 37.1 (03 Feb 2020 05:43)  T(F): 98.8 (03 Feb 2020 14:34), Max: 98.8 (03 Feb 2020 14:34)  HR: 89 (03 Feb 2020 14:34) (82 - 95)  BP: 108/76 (03 Feb 2020 14:34) (101/62 - 125/60)  BP(mean): --  RR: 17 (03 Feb 2020 14:34) (17 - 18)  SpO2: 99% (03 Feb 2020 14:34) (98% - 100%)      PHYSICAL EXAM:  GENERAL: NAD, well-developed  HEAD:  Atraumatic, Normocephalic  EYES: EOMI, PERRLA, conjunctiva and sclera clear  NECK: Supple, No JVD  CHEST/LUNG: Clear to auscultation bilaterally; No wheeze  HEART: Regular rate and rhythm; No murmurs, rubs, or gallops  ABDOMEN: Soft, Nontender, Nondistended; Bowel sounds present  EXTREMITIES:  2+ Peripheral Pulses, No clubbing, cyanosis, or edema  PSYCH: AAOx3  NEUROLOGY: non-focal  SKIN: No rashes or lesions    LABS:                        7.1    6.21  )-----------( 195      ( 03 Feb 2020 06:40 )             21.8     02-03    133<L>  |  98  |  18  ----------------------------<  95  4.0   |  20<L>  |  1.31<H>    Ca    9.0      03 Feb 2020 06:40  Phos  3.4     02-03  Mg     1.7     02-03          Care Discussed with Consultants/Other Providers:  Onc/ pal

## 2020-02-03 NOTE — DIETITIAN INITIAL EVALUATION ADULT. - PERTINENT LABORATORY DATA
02-03 Na133 mmol/L<L> Glu 95 mg/dL K+ 4.0 mmol/L Cr  1.31 mg/dL<H> BUN 18 mg/dL 02-03 Phos 3.4 mg/dL 01-29 Alb 2.6 g/dL<L>

## 2020-02-03 NOTE — PROGRESS NOTE ADULT - ATTENDING COMMENTS
I have reviewed the case in detail with Dr. Morillo, the oncology consult fellow and agree with the assessment and recommendations as outlined in the note. Continue RT per radiation oncology. Agree with vascular imaging and IR evaluation for possible intervention. Transfuse as needed in setting of active bleeding. Will continue to follow with you.

## 2020-02-03 NOTE — PROGRESS NOTE ADULT - ATTENDING COMMENTS
Rt untill Wednesday  if still needs bleeding after rt, will ask IR for ablation of artery to bleeding mass. Rt until Wednesday  if still needs bleeding after rt, will ask IR for ablation of artery to bleeding mass.  Cta of pelvic are  Keep Hgb >7  cbc this pm

## 2020-02-03 NOTE — DIETITIAN INITIAL EVALUATION ADULT. - CONTINUE CURRENT NUTRITION CARE PLAN
yes/1. Continue current diet order, which remains appropriate at this time. 2. Monitor weights, labs, BM's, skin integrity, p.o. intake. 3. Please Encourage po intake, assist with meals and menu selections, provide alternatives PRN.

## 2020-02-03 NOTE — PROGRESS NOTE ADULT - ASSESSMENT
68 yo  with stage IV high-grade uterine carcinosarcoma dx 3/2019, s/p carbo/taxol 7 cycles followed by single agent carboplatin last dose 2019 presenting for hypotension from [facility] [place].    Patient initially admitted, found to have protruding uterine mass consistent with high grade uterine carcinosarcoma. CTAP at that time significant for metastatic lesions consistent with Stage IV disease.     She was found to be hypotensive to 87/56 at [facility] and c/o lightheadedness/dizziness since this morning. She reports vaginal spotting and having passed a lemon sized clot yesterday with no further bleeding since. She reports vaginal discharge with foul odor for months. She endorses nausea/vomiting 2 days ago and again today at Dr. Garcia's office with 1x episode of NBNB emesis. Last BM 2 days ago. Passing flatus today.       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 Anemia due to blood loss. -likely 2/2 vaginal bleed in the setting of uterine cancer.    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   Hgb 8.3 today s/p PRBC on 20 now 7.6- will ask for RT - Onc eval for ?? RT    Seen by GYN Onc- Dr Bolton appreciated  "If significant vaginal bleeding, consider vaginal packing and radiation therapy   - Gyn Onc will continue to follow   Patient seen and evaluated, bleeding decreased, however she had persistent bleeding with final hg 5  Palliative chemo continuing, however, if bleeding returns, would recommend considering palliative RT to the pelvis, given large pelvic mass that is likely bleeding. Patient has never had RT in the past  Recommend rad onc consult ."

## 2020-02-04 LAB
ANION GAP SERPL CALC-SCNC: 14 MMO/L — SIGNIFICANT CHANGE UP (ref 7–14)
BASOPHILS # BLD AUTO: 0.03 K/UL — SIGNIFICANT CHANGE UP (ref 0–0.2)
BASOPHILS # BLD AUTO: 0.04 K/UL — SIGNIFICANT CHANGE UP (ref 0–0.2)
BASOPHILS NFR BLD AUTO: 0.6 % — SIGNIFICANT CHANGE UP (ref 0–2)
BASOPHILS NFR BLD AUTO: 0.7 % — SIGNIFICANT CHANGE UP (ref 0–2)
BLD GP AB SCN SERPL QL: NEGATIVE — SIGNIFICANT CHANGE UP
BUN SERPL-MCNC: 17 MG/DL — SIGNIFICANT CHANGE UP (ref 7–23)
CALCIUM SERPL-MCNC: 9 MG/DL — SIGNIFICANT CHANGE UP (ref 8.4–10.5)
CHLORIDE SERPL-SCNC: 99 MMOL/L — SIGNIFICANT CHANGE UP (ref 98–107)
CO2 SERPL-SCNC: 20 MMOL/L — LOW (ref 22–31)
CREAT SERPL-MCNC: 1.28 MG/DL — SIGNIFICANT CHANGE UP (ref 0.5–1.3)
EOSINOPHIL # BLD AUTO: 0.03 K/UL — SIGNIFICANT CHANGE UP (ref 0–0.5)
EOSINOPHIL # BLD AUTO: 0.04 K/UL — SIGNIFICANT CHANGE UP (ref 0–0.5)
EOSINOPHIL NFR BLD AUTO: 0.5 % — SIGNIFICANT CHANGE UP (ref 0–6)
EOSINOPHIL NFR BLD AUTO: 0.8 % — SIGNIFICANT CHANGE UP (ref 0–6)
GLUCOSE SERPL-MCNC: 99 MG/DL — SIGNIFICANT CHANGE UP (ref 70–99)
HCT VFR BLD CALC: 21.1 % — LOW (ref 34.5–45)
HCT VFR BLD CALC: 24.1 % — LOW (ref 34.5–45)
HGB BLD-MCNC: 7.1 G/DL — LOW (ref 11.5–15.5)
HGB BLD-MCNC: 7.9 G/DL — LOW (ref 11.5–15.5)
IMM GRANULOCYTES NFR BLD AUTO: 1.7 % — HIGH (ref 0–1.5)
IMM GRANULOCYTES NFR BLD AUTO: 2.1 % — HIGH (ref 0–1.5)
LYMPHOCYTES # BLD AUTO: 0.83 K/UL — LOW (ref 1–3.3)
LYMPHOCYTES # BLD AUTO: 0.87 K/UL — LOW (ref 1–3.3)
LYMPHOCYTES # BLD AUTO: 14.6 % — SIGNIFICANT CHANGE UP (ref 13–44)
LYMPHOCYTES # BLD AUTO: 16.8 % — SIGNIFICANT CHANGE UP (ref 13–44)
MAGNESIUM SERPL-MCNC: 1.6 MG/DL — SIGNIFICANT CHANGE UP (ref 1.6–2.6)
MCHC RBC-ENTMCNC: 29.8 PG — SIGNIFICANT CHANGE UP (ref 27–34)
MCHC RBC-ENTMCNC: 30.9 PG — SIGNIFICANT CHANGE UP (ref 27–34)
MCHC RBC-ENTMCNC: 32.8 % — SIGNIFICANT CHANGE UP (ref 32–36)
MCHC RBC-ENTMCNC: 33.6 % — SIGNIFICANT CHANGE UP (ref 32–36)
MCV RBC AUTO: 90.9 FL — SIGNIFICANT CHANGE UP (ref 80–100)
MCV RBC AUTO: 91.7 FL — SIGNIFICANT CHANGE UP (ref 80–100)
MONOCYTES # BLD AUTO: 0.43 K/UL — SIGNIFICANT CHANGE UP (ref 0–0.9)
MONOCYTES # BLD AUTO: 0.52 K/UL — SIGNIFICANT CHANGE UP (ref 0–0.9)
MONOCYTES NFR BLD AUTO: 8.3 % — SIGNIFICANT CHANGE UP (ref 2–14)
MONOCYTES NFR BLD AUTO: 9.1 % — SIGNIFICANT CHANGE UP (ref 2–14)
NEUTROPHILS # BLD AUTO: 3.72 K/UL — SIGNIFICANT CHANGE UP (ref 1.8–7.4)
NEUTROPHILS # BLD AUTO: 4.16 K/UL — SIGNIFICANT CHANGE UP (ref 1.8–7.4)
NEUTROPHILS NFR BLD AUTO: 71.8 % — SIGNIFICANT CHANGE UP (ref 43–77)
NEUTROPHILS NFR BLD AUTO: 73 % — SIGNIFICANT CHANGE UP (ref 43–77)
NRBC # FLD: 0 K/UL — SIGNIFICANT CHANGE UP (ref 0–0)
NRBC # FLD: 0 K/UL — SIGNIFICANT CHANGE UP (ref 0–0)
PHOSPHATE SERPL-MCNC: 3.5 MG/DL — SIGNIFICANT CHANGE UP (ref 2.5–4.5)
PLATELET # BLD AUTO: 159 K/UL — SIGNIFICANT CHANGE UP (ref 150–400)
PLATELET # BLD AUTO: 186 K/UL — SIGNIFICANT CHANGE UP (ref 150–400)
PMV BLD: 9.1 FL — SIGNIFICANT CHANGE UP (ref 7–13)
PMV BLD: 9.6 FL — SIGNIFICANT CHANGE UP (ref 7–13)
POTASSIUM SERPL-MCNC: 4.1 MMOL/L — SIGNIFICANT CHANGE UP (ref 3.5–5.3)
POTASSIUM SERPL-SCNC: 4.1 MMOL/L — SIGNIFICANT CHANGE UP (ref 3.5–5.3)
RBC # BLD: 2.3 M/UL — LOW (ref 3.8–5.2)
RBC # BLD: 2.65 M/UL — LOW (ref 3.8–5.2)
RBC # FLD: 15 % — HIGH (ref 10.3–14.5)
RBC # FLD: 15.2 % — HIGH (ref 10.3–14.5)
RH IG SCN BLD-IMP: POSITIVE — SIGNIFICANT CHANGE UP
SODIUM SERPL-SCNC: 133 MMOL/L — LOW (ref 135–145)
WBC # BLD: 5.18 K/UL — SIGNIFICANT CHANGE UP (ref 3.8–10.5)
WBC # BLD: 5.7 K/UL — SIGNIFICANT CHANGE UP (ref 3.8–10.5)
WBC # FLD AUTO: 5.18 K/UL — SIGNIFICANT CHANGE UP (ref 3.8–10.5)
WBC # FLD AUTO: 5.7 K/UL — SIGNIFICANT CHANGE UP (ref 3.8–10.5)

## 2020-02-04 PROCEDURE — 74178 CT ABD&PLV WO CNTR FLWD CNTR: CPT | Mod: 26

## 2020-02-04 PROCEDURE — 99233 SBSQ HOSP IP/OBS HIGH 50: CPT

## 2020-02-04 RX ORDER — SODIUM CHLORIDE 9 MG/ML
1000 INJECTION INTRAMUSCULAR; INTRAVENOUS; SUBCUTANEOUS
Refills: 0 | Status: DISCONTINUED | OUTPATIENT
Start: 2020-02-04 | End: 2020-02-05

## 2020-02-04 RX ORDER — OXYCODONE HYDROCHLORIDE 5 MG/1
5 TABLET ORAL EVERY 6 HOURS
Refills: 0 | Status: DISCONTINUED | OUTPATIENT
Start: 2020-02-04 | End: 2020-02-05

## 2020-02-04 RX ADMIN — OXYCODONE HYDROCHLORIDE 5 MILLIGRAM(S): 5 TABLET ORAL at 14:00

## 2020-02-04 RX ADMIN — CHLORHEXIDINE GLUCONATE 1 APPLICATION(S): 213 SOLUTION TOPICAL at 12:17

## 2020-02-04 RX ADMIN — OXYCODONE HYDROCHLORIDE 5 MILLIGRAM(S): 5 TABLET ORAL at 13:02

## 2020-02-04 RX ADMIN — SODIUM CHLORIDE 75 MILLILITER(S): 9 INJECTION INTRAMUSCULAR; INTRAVENOUS; SUBCUTANEOUS at 21:03

## 2020-02-04 NOTE — PROGRESS NOTE ADULT - ASSESSMENT
68 yo  with stage IV high-grade uterine carcinosarcoma dx 3/2019, s/p carbo/taxol 7 cycles followed by single agent carboplatin last dose 2019 presenting for hypotension from [facility] [place].    Patient initially admitted, found to have protruding uterine mass consistent with high grade uterine carcinosarcoma. CTAP at that time significant for metastatic lesions consistent with Stage IV disease.     She was found to be hypotensive to 87/56 at [facility] and c/o lightheadedness/dizziness since this morning. She reports vaginal spotting and having passed a lemon sized clot yesterday with no further bleeding since. She reports vaginal discharge with foul odor for months. She endorses nausea/vomiting 2 days ago and again today at Dr. Garcia's office with 1x episode of NBNB emesis. Last BM 2 days ago. Passing flatus today.       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 Anemia due to blood loss. -likely 2/2 vaginal bleed in the setting of uterine cancer.    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   Hgb 8.3 today s/p PRBC on 20 now 7.6- will ask for RT - Onc eval for ?? RT    Seen by GYN Onc- Dr Bolton appreciated  "If significant vaginal bleeding, consider vaginal packing and radiation therapy   - Gyn Onc will continue to follow   Patient seen and evaluated, bleeding decreased, however she had persistent bleeding with final hg 5  Palliative chemo continuing, however, if bleeding returns, would recommend considering palliative RT to the pelvis, given large pelvic mass that is likely bleeding. Patient has never had RT in the past  Recommend rad onc consult ."

## 2020-02-04 NOTE — PROGRESS NOTE ADULT - SUBJECTIVE AND OBJECTIVE BOX
Patient is a 69y old  Female who presents with a chief complaint of Sent from Onc office for hypotension (03 Feb 2020 10:12)      SUBJECTIVE / OVERNIGHT EVENTS:  Patient notes 8/10 pain to pelvis area    MEDICATIONS  (STANDING):  chlorhexidine 4% Liquid 1 Application(s) Topical daily  gabapentin 300 milliGRAM(s) Oral two times a day  lactulose Syrup 10 Gram(s) Oral every 6 hours  polyethylene glycol 3350 17 Gram(s) Oral daily  sodium chloride 0.9%. 1000 milliLiter(s) (100 mL/Hr) IV Continuous <Continuous>    MEDICATIONS  (PRN):  bisacodyl 5 milliGRAM(s) Oral every 12 hours PRN Constipation  ondansetron Injectable 4 milliGRAM(s) IV Push every 8 hours PRN Nausea and/or Vomiting  oxyCODONE    IR 5 milliGRAM(s) Oral every 6 hours PRN mod and severe pain      Vital Signs Last 24 Hrs  T(C): 36.8 (04 Feb 2020 06:07), Max: 37.1 (03 Feb 2020 14:34)  T(F): 98.3 (04 Feb 2020 06:07), Max: 98.8 (03 Feb 2020 14:34)  HR: 100 (04 Feb 2020 06:07) (89 - 100)  BP: 102/66 (04 Feb 2020 06:07) (102/66 - 111/67)  BP(mean): --  RR: 19 (04 Feb 2020 06:07) (17 - 19)  SpO2: 97% (04 Feb 2020 06:07) (97% - 99%)      PHYSICAL EXAM:  GENERAL: NAD, well-developed  HEAD:  Atraumatic, Normocephalic  EYES: EOMI, PERRLA, conjunctiva and sclera clear  NECK: Supple, No JVD  CHEST/LUNG: Clear to auscultation bilaterally; No wheeze  HEART: Regular rate and rhythm; No murmurs, rubs, or gallops  ABDOMEN: Soft, Nontender, Nondistended; Bowel sounds present  EXTREMITIES:  2+ Peripheral Pulses, No clubbing, cyanosis, or edema  PSYCH: AAOx3  NEUROLOGY: non-focal  SKIN: No rashes or lesions    LABS:                        7.9    5.70  )-----------( 186      ( 04 Feb 2020 05:30 )             24.1     02-04    133<L>  |  99  |  17  ----------------------------<  99  4.1   |  20<L>  |  1.28    Ca    9.0      04 Feb 2020 05:30  Phos  3.5     02-04  Mg     1.6     02-04          Care Discussed with Consultants/Other Providers:  Rt- onc - rt ends 2/5  Onc- out patient f/u with Dr NOVA Braden if bleeding can be stopped Rx will be considered

## 2020-02-04 NOTE — PROGRESS NOTE ADULT - ATTENDING COMMENTS
rt until 2/5  cta of pelvis to asses for bleeding vessel and asses mass  start oxy ir 5 mg prn mod/ severe pain- if not helping will give IV- x1 and up the oxy ir to 10 mg prn

## 2020-02-05 LAB
ANION GAP SERPL CALC-SCNC: 12 MMO/L — SIGNIFICANT CHANGE UP (ref 7–14)
APTT BLD: 26.9 SEC — LOW (ref 27.5–36.3)
BASOPHILS # BLD AUTO: 0.01 K/UL — SIGNIFICANT CHANGE UP (ref 0–0.2)
BASOPHILS # BLD AUTO: 0.03 K/UL — SIGNIFICANT CHANGE UP (ref 0–0.2)
BASOPHILS NFR BLD AUTO: 0.2 % — SIGNIFICANT CHANGE UP (ref 0–2)
BASOPHILS NFR BLD AUTO: 0.6 % — SIGNIFICANT CHANGE UP (ref 0–2)
BUN SERPL-MCNC: 15 MG/DL — SIGNIFICANT CHANGE UP (ref 7–23)
CALCIUM SERPL-MCNC: 8.8 MG/DL — SIGNIFICANT CHANGE UP (ref 8.4–10.5)
CHLORIDE SERPL-SCNC: 100 MMOL/L — SIGNIFICANT CHANGE UP (ref 98–107)
CO2 SERPL-SCNC: 20 MMOL/L — LOW (ref 22–31)
CREAT SERPL-MCNC: 1.25 MG/DL — SIGNIFICANT CHANGE UP (ref 0.5–1.3)
EOSINOPHIL # BLD AUTO: 0.02 K/UL — SIGNIFICANT CHANGE UP (ref 0–0.5)
EOSINOPHIL # BLD AUTO: 0.04 K/UL — SIGNIFICANT CHANGE UP (ref 0–0.5)
EOSINOPHIL NFR BLD AUTO: 0.4 % — SIGNIFICANT CHANGE UP (ref 0–6)
EOSINOPHIL NFR BLD AUTO: 0.8 % — SIGNIFICANT CHANGE UP (ref 0–6)
GLUCOSE SERPL-MCNC: 93 MG/DL — SIGNIFICANT CHANGE UP (ref 70–99)
HCT VFR BLD CALC: 21.1 % — LOW (ref 34.5–45)
HCT VFR BLD CALC: 23.5 % — LOW (ref 34.5–45)
HGB BLD-MCNC: 6.6 G/DL — CRITICAL LOW (ref 11.5–15.5)
HGB BLD-MCNC: 7.7 G/DL — LOW (ref 11.5–15.5)
IMM GRANULOCYTES NFR BLD AUTO: 1.3 % — SIGNIFICANT CHANGE UP (ref 0–1.5)
IMM GRANULOCYTES NFR BLD AUTO: 1.4 % — SIGNIFICANT CHANGE UP (ref 0–1.5)
INR BLD: 1.2 — HIGH (ref 0.88–1.17)
LYMPHOCYTES # BLD AUTO: 0.7 K/UL — LOW (ref 1–3.3)
LYMPHOCYTES # BLD AUTO: 0.77 K/UL — LOW (ref 1–3.3)
LYMPHOCYTES # BLD AUTO: 15.1 % — SIGNIFICANT CHANGE UP (ref 13–44)
LYMPHOCYTES # BLD AUTO: 15.9 % — SIGNIFICANT CHANGE UP (ref 13–44)
MAGNESIUM SERPL-MCNC: 1.6 MG/DL — SIGNIFICANT CHANGE UP (ref 1.6–2.6)
MCHC RBC-ENTMCNC: 30.1 PG — SIGNIFICANT CHANGE UP (ref 27–34)
MCHC RBC-ENTMCNC: 30.2 PG — SIGNIFICANT CHANGE UP (ref 27–34)
MCHC RBC-ENTMCNC: 31.3 % — LOW (ref 32–36)
MCHC RBC-ENTMCNC: 32.8 % — SIGNIFICANT CHANGE UP (ref 32–36)
MCV RBC AUTO: 92.2 FL — SIGNIFICANT CHANGE UP (ref 80–100)
MCV RBC AUTO: 96.3 FL — SIGNIFICANT CHANGE UP (ref 80–100)
MONOCYTES # BLD AUTO: 0.4 K/UL — SIGNIFICANT CHANGE UP (ref 0–0.9)
MONOCYTES # BLD AUTO: 0.45 K/UL — SIGNIFICANT CHANGE UP (ref 0–0.9)
MONOCYTES NFR BLD AUTO: 8.6 % — SIGNIFICANT CHANGE UP (ref 2–14)
MONOCYTES NFR BLD AUTO: 9.3 % — SIGNIFICANT CHANGE UP (ref 2–14)
NEUTROPHILS # BLD AUTO: 3.46 K/UL — SIGNIFICANT CHANGE UP (ref 1.8–7.4)
NEUTROPHILS # BLD AUTO: 3.47 K/UL — SIGNIFICANT CHANGE UP (ref 1.8–7.4)
NEUTROPHILS NFR BLD AUTO: 72 % — SIGNIFICANT CHANGE UP (ref 43–77)
NEUTROPHILS NFR BLD AUTO: 74.4 % — SIGNIFICANT CHANGE UP (ref 43–77)
NRBC # FLD: 0 K/UL — SIGNIFICANT CHANGE UP (ref 0–0)
NRBC # FLD: 0 K/UL — SIGNIFICANT CHANGE UP (ref 0–0)
PHOSPHATE SERPL-MCNC: 3.6 MG/DL — SIGNIFICANT CHANGE UP (ref 2.5–4.5)
PLATELET # BLD AUTO: 150 K/UL — SIGNIFICANT CHANGE UP (ref 150–400)
PLATELET # BLD AUTO: 169 K/UL — SIGNIFICANT CHANGE UP (ref 150–400)
PMV BLD: 9.3 FL — SIGNIFICANT CHANGE UP (ref 7–13)
PMV BLD: 9.6 FL — SIGNIFICANT CHANGE UP (ref 7–13)
POTASSIUM SERPL-MCNC: 4 MMOL/L — SIGNIFICANT CHANGE UP (ref 3.5–5.3)
POTASSIUM SERPL-SCNC: 4 MMOL/L — SIGNIFICANT CHANGE UP (ref 3.5–5.3)
PROTHROM AB SERPL-ACNC: 13.8 SEC — HIGH (ref 9.8–13.1)
RBC # BLD: 2.19 M/UL — LOW (ref 3.8–5.2)
RBC # BLD: 2.55 M/UL — LOW (ref 3.8–5.2)
RBC # FLD: 14.8 % — HIGH (ref 10.3–14.5)
RBC # FLD: 15.4 % — HIGH (ref 10.3–14.5)
SODIUM SERPL-SCNC: 132 MMOL/L — LOW (ref 135–145)
WBC # BLD: 4.65 K/UL — SIGNIFICANT CHANGE UP (ref 3.8–10.5)
WBC # BLD: 4.83 K/UL — SIGNIFICANT CHANGE UP (ref 3.8–10.5)
WBC # FLD AUTO: 4.65 K/UL — SIGNIFICANT CHANGE UP (ref 3.8–10.5)
WBC # FLD AUTO: 4.83 K/UL — SIGNIFICANT CHANGE UP (ref 3.8–10.5)

## 2020-02-05 PROCEDURE — 99232 SBSQ HOSP IP/OBS MODERATE 35: CPT

## 2020-02-05 RX ORDER — OXYCODONE HYDROCHLORIDE 5 MG/1
5 TABLET ORAL EVERY 6 HOURS
Refills: 0 | Status: DISCONTINUED | OUTPATIENT
Start: 2020-02-05 | End: 2020-02-06

## 2020-02-05 RX ORDER — ACETAMINOPHEN 500 MG
650 TABLET ORAL EVERY 6 HOURS
Refills: 0 | Status: DISCONTINUED | OUTPATIENT
Start: 2020-02-05 | End: 2020-02-20

## 2020-02-05 RX ORDER — ACETAMINOPHEN 500 MG
650 TABLET ORAL EVERY 6 HOURS
Refills: 0 | Status: DISCONTINUED | OUTPATIENT
Start: 2020-02-05 | End: 2020-02-05

## 2020-02-05 RX ADMIN — OXYCODONE HYDROCHLORIDE 5 MILLIGRAM(S): 5 TABLET ORAL at 06:48

## 2020-02-05 RX ADMIN — OXYCODONE HYDROCHLORIDE 5 MILLIGRAM(S): 5 TABLET ORAL at 14:45

## 2020-02-05 RX ADMIN — OXYCODONE HYDROCHLORIDE 5 MILLIGRAM(S): 5 TABLET ORAL at 05:48

## 2020-02-05 RX ADMIN — OXYCODONE HYDROCHLORIDE 5 MILLIGRAM(S): 5 TABLET ORAL at 15:00

## 2020-02-05 RX ADMIN — CHLORHEXIDINE GLUCONATE 1 APPLICATION(S): 213 SOLUTION TOPICAL at 14:45

## 2020-02-05 NOTE — PROGRESS NOTE ADULT - ASSESSMENT
70 yo  with stage IV high-grade uterine carcinosarcoma dx 3/2019, s/p carbo/taxol 7 cycles followed by single agent carboplatin last dose 2019 presenting for hypotension from [facility].    Patient initially admitted, found to have protruding uterine mass consistent with high grade uterine carcinosarcoma. CTAP at that time significant for metastatic lesions consistent with Stage IV disease.     She was found to be hypotensive to 87/56 at [facility] and c/o lightheadedness/dizziness since this morning. She reports vaginal spotting and having passed a lemon sized clot yesterday with no further bleeding since. She reports vaginal discharge with foul odor for months. She endorses nausea/vomiting 2 days ago and again today at Dr. Garcia's office with 1x episode of NBNB emesis. Last BM 2 days ago. Passing flatus today.       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 Anemia due to blood loss. -likely 2/2 vaginal bleed in the setting of uterine cancer.    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   Hgb 8.3 today s/p PRBC on 20 now 7.6- will ask for RT - Onc eval for ?? RT    Seen by GYN Onc- Dr Bolton appreciated  "If significant vaginal bleeding, consider vaginal packing and radiation therapy   - Gyn Onc will continue to follow   Patient seen and evaluated, bleeding decreased, however she had persistent bleeding with final hg 5  Palliative chemo continuing, however, if bleeding returns, would recommend considering palliative RT to the pelvis, given large pelvic mass that is likely bleeding. Patient has never had RT in the past  Recommend rad onc consult ."

## 2020-02-05 NOTE — PROGRESS NOTE ADULT - ATTENDING COMMENTS
still bleeding  PRBC  Ir to call back about ??? embolization still bleeding  PRBC  Ir to call back about ??? embolization  Onc rec palliative consult- pallitive will see

## 2020-02-05 NOTE — PROGRESS NOTE ADULT - SUBJECTIVE AND OBJECTIVE BOX
Patient is a 69y old  Female who presents with a chief complaint of Sent from Onc office for hypotension (05 Feb 2020 12:16)      SUBJECTIVE / OVERNIGHT EVENTS:  patient upset over ongoing bleed.  Ir asked to help with possible ablation of bleeding vessel- non seen on CTA    MEDICATIONS  (STANDING):  chlorhexidine 4% Liquid 1 Application(s) Topical daily  gabapentin 300 milliGRAM(s) Oral two times a day  lactulose Syrup 10 Gram(s) Oral every 6 hours  polyethylene glycol 3350 17 Gram(s) Oral daily  sodium chloride 0.9%. 1000 milliLiter(s) (75 mL/Hr) IV Continuous <Continuous>    MEDICATIONS  (PRN):  acetaminophen   Tablet .. 650 milliGRAM(s) Oral every 6 hours PRN Temp greater or equal to 38C (100.4F), Mild Pain (1 - 3), Moderate Pain (4 - 6)  bisacodyl 5 milliGRAM(s) Oral every 12 hours PRN Constipation  ondansetron Injectable 4 milliGRAM(s) IV Push every 8 hours PRN Nausea and/or Vomiting  oxyCODONE    IR 5 milliGRAM(s) Oral every 6 hours PRN mod and severe pain    Vital Signs Last 24 Hrs  T(C): 36.7 (05 Feb 2020 13:14), Max: 37.2 (05 Feb 2020 05:43)  T(F): 98.1 (05 Feb 2020 13:14), Max: 98.9 (05 Feb 2020 05:43)  HR: 97 (05 Feb 2020 13:14) (93 - 97)  BP: 103/70 (05 Feb 2020 13:14) (103/66 - 103/70)  BP(mean): --  RR: 17 (05 Feb 2020 13:14) (17 - 19)  SpO2: 100% (05 Feb 2020 13:14) (98% - 100%)      PHYSICAL EXAM:  GENERAL: NAD, well-developed  HEAD:  Atraumatic, Normocephalic  EYES: EOMI, PERRLA, conjunctiva and sclera clear  NECK: Supple, No JVD  CHEST/LUNG: Clear to auscultation bilaterally; No wheeze  HEART: Regular rate and rhythm; No murmurs, rubs, or gallops  ABDOMEN: Soft, Nontender, Nondistended; Bowel sounds present  EXTREMITIES:  2+ Peripheral Pulses, No clubbing, cyanosis, or edema  PSYCH: AAOx3  NEUROLOGY: non-focal  SKIN: No rashes or lesions    LABS:                        7.7    4.65  )-----------( 150      ( 05 Feb 2020 14:15 )             23.5     02-05    132<L>  |  100  |  15  ----------------------------<  93  4.0   |  20<L>  |  1.25    Ca    8.8      05 Feb 2020 05:45  Phos  3.6     02-05  Mg     1.6     02-05      PT/INR - ( 05 Feb 2020 11:04 )   PT: 13.8 SEC;   INR: 1.20          PTT - ( 05 Feb 2020 11:04 )  PTT:26.9 SEC          Care Discussed with Consultants/Other Providers:  Rt- onc/ onc/ Ir- pallitive consult requested

## 2020-02-05 NOTE — PROGRESS NOTE ADULT - ASSESSMENT
70 yo  with stage IV high-grade uterine carcinosarcoma dx 3/2019, s/p carbo/taxol 7 cycles followed by single agent carboplatin last dose 2019 [facility] from [facility].    Stage IV uterine carcinosarcoma  - CT C/A/P WO showing increased size of endometrial and cervical mass along with a new left para-aortic mass consistent with metastatic disease  - bleeding coming from mass, no GYN intervention at this time.    - s/p 2/5 fractions of RT per rad onc.  Today will be the 3rd.  - CT A/P W performed, no bleeding vessel visualized.  IR consulted, however unclear if anything can be embolized.  - if continues to bleed and IR cannot embolize, no further chemotherapy can be offered and hospice is appropriate.  Dr Garcia aware.   - no plans for inpatient chemotherapy  - bowel regimen  - transfuse if hg < 7.0 or pt symptomatic  - outpatient f/u with Dr. Jose Morillo DO  Hematology/Oncology Fellow, PGY5  Pager: [phone]

## 2020-02-06 LAB
ANION GAP SERPL CALC-SCNC: 12 MMO/L — SIGNIFICANT CHANGE UP (ref 7–14)
BASOPHILS # BLD AUTO: 0.01 K/UL — SIGNIFICANT CHANGE UP (ref 0–0.2)
BASOPHILS # BLD AUTO: 0.02 K/UL — SIGNIFICANT CHANGE UP (ref 0–0.2)
BASOPHILS NFR BLD AUTO: 0.2 % — SIGNIFICANT CHANGE UP (ref 0–2)
BASOPHILS NFR BLD AUTO: 0.4 % — SIGNIFICANT CHANGE UP (ref 0–2)
BUN SERPL-MCNC: 16 MG/DL — SIGNIFICANT CHANGE UP (ref 7–23)
CALCIUM SERPL-MCNC: 9 MG/DL — SIGNIFICANT CHANGE UP (ref 8.4–10.5)
CHLORIDE SERPL-SCNC: 99 MMOL/L — SIGNIFICANT CHANGE UP (ref 98–107)
CO2 SERPL-SCNC: 22 MMOL/L — SIGNIFICANT CHANGE UP (ref 22–31)
CREAT SERPL-MCNC: 1.4 MG/DL — HIGH (ref 0.5–1.3)
EOSINOPHIL # BLD AUTO: 0.04 K/UL — SIGNIFICANT CHANGE UP (ref 0–0.5)
EOSINOPHIL # BLD AUTO: 0.04 K/UL — SIGNIFICANT CHANGE UP (ref 0–0.5)
EOSINOPHIL NFR BLD AUTO: 0.8 % — SIGNIFICANT CHANGE UP (ref 0–6)
EOSINOPHIL NFR BLD AUTO: 0.8 % — SIGNIFICANT CHANGE UP (ref 0–6)
GLUCOSE SERPL-MCNC: 97 MG/DL — SIGNIFICANT CHANGE UP (ref 70–99)
HCT VFR BLD CALC: 21.5 % — LOW (ref 34.5–45)
HCT VFR BLD CALC: 22.2 % — LOW (ref 34.5–45)
HCT VFR BLD CALC: 23.7 % — LOW (ref 34.5–45)
HGB BLD-MCNC: 7 G/DL — CRITICAL LOW (ref 11.5–15.5)
HGB BLD-MCNC: 7.2 G/DL — LOW (ref 11.5–15.5)
HGB BLD-MCNC: 7.9 G/DL — LOW (ref 11.5–15.5)
IMM GRANULOCYTES NFR BLD AUTO: 1 % — SIGNIFICANT CHANGE UP (ref 0–1.5)
IMM GRANULOCYTES NFR BLD AUTO: 1.2 % — SIGNIFICANT CHANGE UP (ref 0–1.5)
LYMPHOCYTES # BLD AUTO: 0.65 K/UL — LOW (ref 1–3.3)
LYMPHOCYTES # BLD AUTO: 0.85 K/UL — LOW (ref 1–3.3)
LYMPHOCYTES # BLD AUTO: 13 % — SIGNIFICANT CHANGE UP (ref 13–44)
LYMPHOCYTES # BLD AUTO: 16.5 % — SIGNIFICANT CHANGE UP (ref 13–44)
MAGNESIUM SERPL-MCNC: 1.6 MG/DL — SIGNIFICANT CHANGE UP (ref 1.6–2.6)
MCHC RBC-ENTMCNC: 30.1 PG — SIGNIFICANT CHANGE UP (ref 27–34)
MCHC RBC-ENTMCNC: 30.4 PG — SIGNIFICANT CHANGE UP (ref 27–34)
MCHC RBC-ENTMCNC: 30.7 PG — SIGNIFICANT CHANGE UP (ref 27–34)
MCHC RBC-ENTMCNC: 32.4 % — SIGNIFICANT CHANGE UP (ref 32–36)
MCHC RBC-ENTMCNC: 32.6 % — SIGNIFICANT CHANGE UP (ref 32–36)
MCHC RBC-ENTMCNC: 33.3 % — SIGNIFICANT CHANGE UP (ref 32–36)
MCV RBC AUTO: 92.2 FL — SIGNIFICANT CHANGE UP (ref 80–100)
MCV RBC AUTO: 92.9 FL — SIGNIFICANT CHANGE UP (ref 80–100)
MCV RBC AUTO: 93.5 FL — SIGNIFICANT CHANGE UP (ref 80–100)
MONOCYTES # BLD AUTO: 0.39 K/UL — SIGNIFICANT CHANGE UP (ref 0–0.9)
MONOCYTES # BLD AUTO: 0.51 K/UL — SIGNIFICANT CHANGE UP (ref 0–0.9)
MONOCYTES NFR BLD AUTO: 10.2 % — SIGNIFICANT CHANGE UP (ref 2–14)
MONOCYTES NFR BLD AUTO: 7.6 % — SIGNIFICANT CHANGE UP (ref 2–14)
NEUTROPHILS # BLD AUTO: 3.72 K/UL — SIGNIFICANT CHANGE UP (ref 1.8–7.4)
NEUTROPHILS # BLD AUTO: 3.8 K/UL — SIGNIFICANT CHANGE UP (ref 1.8–7.4)
NEUTROPHILS NFR BLD AUTO: 73.7 % — SIGNIFICANT CHANGE UP (ref 43–77)
NEUTROPHILS NFR BLD AUTO: 74.6 % — SIGNIFICANT CHANGE UP (ref 43–77)
NRBC # FLD: 0 K/UL — SIGNIFICANT CHANGE UP (ref 0–0)
PHOSPHATE SERPL-MCNC: 3.5 MG/DL — SIGNIFICANT CHANGE UP (ref 2.5–4.5)
PLATELET # BLD AUTO: 147 K/UL — LOW (ref 150–400)
PLATELET # BLD AUTO: 153 K/UL — SIGNIFICANT CHANGE UP (ref 150–400)
PLATELET # BLD AUTO: 162 K/UL — SIGNIFICANT CHANGE UP (ref 150–400)
PMV BLD: 8.9 FL — SIGNIFICANT CHANGE UP (ref 7–13)
PMV BLD: 9.2 FL — SIGNIFICANT CHANGE UP (ref 7–13)
PMV BLD: 9.3 FL — SIGNIFICANT CHANGE UP (ref 7–13)
POTASSIUM SERPL-MCNC: 4.1 MMOL/L — SIGNIFICANT CHANGE UP (ref 3.5–5.3)
POTASSIUM SERPL-SCNC: 4.1 MMOL/L — SIGNIFICANT CHANGE UP (ref 3.5–5.3)
RBC # BLD: 2.3 M/UL — LOW (ref 3.8–5.2)
RBC # BLD: 2.39 M/UL — LOW (ref 3.8–5.2)
RBC # BLD: 2.57 M/UL — LOW (ref 3.8–5.2)
RBC # FLD: 15 % — HIGH (ref 10.3–14.5)
RBC # FLD: 15.1 % — HIGH (ref 10.3–14.5)
RBC # FLD: 15.1 % — HIGH (ref 10.3–14.5)
SODIUM SERPL-SCNC: 133 MMOL/L — LOW (ref 135–145)
WBC # BLD: 4.28 K/UL — SIGNIFICANT CHANGE UP (ref 3.8–10.5)
WBC # BLD: 4.99 K/UL — SIGNIFICANT CHANGE UP (ref 3.8–10.5)
WBC # BLD: 5.15 K/UL — SIGNIFICANT CHANGE UP (ref 3.8–10.5)
WBC # FLD AUTO: 4.28 K/UL — SIGNIFICANT CHANGE UP (ref 3.8–10.5)
WBC # FLD AUTO: 4.99 K/UL — SIGNIFICANT CHANGE UP (ref 3.8–10.5)
WBC # FLD AUTO: 5.15 K/UL — SIGNIFICANT CHANGE UP (ref 3.8–10.5)

## 2020-02-06 PROCEDURE — 99232 SBSQ HOSP IP/OBS MODERATE 35: CPT

## 2020-02-06 PROCEDURE — 99221 1ST HOSP IP/OBS SF/LOW 40: CPT

## 2020-02-06 RX ORDER — SODIUM CHLORIDE 9 MG/ML
1000 INJECTION INTRAMUSCULAR; INTRAVENOUS; SUBCUTANEOUS
Refills: 0 | Status: DISCONTINUED | OUTPATIENT
Start: 2020-02-07 | End: 2020-02-07

## 2020-02-06 RX ORDER — SODIUM CHLORIDE 9 MG/ML
1000 INJECTION INTRAMUSCULAR; INTRAVENOUS; SUBCUTANEOUS
Refills: 0 | Status: DISCONTINUED | OUTPATIENT
Start: 2020-02-06 | End: 2020-02-06

## 2020-02-06 RX ORDER — OXYCODONE HYDROCHLORIDE 5 MG/1
5 TABLET ORAL EVERY 6 HOURS
Refills: 0 | Status: DISCONTINUED | OUTPATIENT
Start: 2020-02-06 | End: 2020-02-11

## 2020-02-06 RX ADMIN — OXYCODONE HYDROCHLORIDE 5 MILLIGRAM(S): 5 TABLET ORAL at 07:47

## 2020-02-06 RX ADMIN — OXYCODONE HYDROCHLORIDE 5 MILLIGRAM(S): 5 TABLET ORAL at 22:40

## 2020-02-06 RX ADMIN — OXYCODONE HYDROCHLORIDE 5 MILLIGRAM(S): 5 TABLET ORAL at 23:41

## 2020-02-06 RX ADMIN — CHLORHEXIDINE GLUCONATE 1 APPLICATION(S): 213 SOLUTION TOPICAL at 12:37

## 2020-02-06 RX ADMIN — SODIUM CHLORIDE 75 MILLILITER(S): 9 INJECTION INTRAMUSCULAR; INTRAVENOUS; SUBCUTANEOUS at 06:35

## 2020-02-06 RX ADMIN — LACTULOSE 10 GRAM(S): 10 SOLUTION ORAL at 06:34

## 2020-02-06 RX ADMIN — GABAPENTIN 300 MILLIGRAM(S): 400 CAPSULE ORAL at 06:35

## 2020-02-06 RX ADMIN — OXYCODONE HYDROCHLORIDE 5 MILLIGRAM(S): 5 TABLET ORAL at 01:04

## 2020-02-06 RX ADMIN — OXYCODONE HYDROCHLORIDE 5 MILLIGRAM(S): 5 TABLET ORAL at 06:47

## 2020-02-06 RX ADMIN — OXYCODONE HYDROCHLORIDE 5 MILLIGRAM(S): 5 TABLET ORAL at 00:04

## 2020-02-06 RX ADMIN — LACTULOSE 10 GRAM(S): 10 SOLUTION ORAL at 18:57

## 2020-02-06 NOTE — CONSULT NOTE ADULT - PROBLEM SELECTOR RECOMMENDATION 9
- Please keep pt NPO starting midnight prior to procedure  - Repeat Am labs  - consent to be obtained

## 2020-02-06 NOTE — CONSULT NOTE ADULT - SUBJECTIVE AND OBJECTIVE BOX
Chief complaint:  Patient is a 69y old  Female who presents with a chief complaint of vaginal bleed    HPI:  68yo F with hx of HTN, uterine carcinosarcoma (on Carboplatin), presented to oncologist for hypotension, found to be anemic with vaginal bleed. Pt was admitted for management. IR was consulted for embolization. Pt was seen at bedside resting comfortably with complaints of mild abdominal pain but otherwise ok. Denied fever, NVD, PC, SOB.     Allergies  No Known Allergies    MEDICATIONS  (STANDING):  chlorhexidine 4% Liquid 1 Application(s) Topical daily  gabapentin 300 milliGRAM(s) Oral two times a day  lactulose Syrup 10 Gram(s) Oral every 6 hours  polyethylene glycol 3350 17 Gram(s) Oral daily  sodium chloride 0.9%. 1000 milliLiter(s) (75 mL/Hr) IV Continuous <Continuous>    MEDICATIONS  (PRN):  acetaminophen   Tablet .. 650 milliGRAM(s) Oral every 6 hours PRN Temp greater or equal to 38C (100.4F), Mild Pain (1 - 3), Moderate Pain (4 - 6)  bisacodyl 5 milliGRAM(s) Oral every 12 hours PRN Constipation  ondansetron Injectable 4 milliGRAM(s) IV Push every 8 hours PRN Nausea and/or Vomiting  oxyCODONE    IR 5 milliGRAM(s) Oral every 6 hours PRN moderate pain 4-6 and severe pain 7-10      PAST MEDICAL & SURGICAL HISTORY:  Malignant neoplasm of uterus, unspecified site  History of cervical biopsy      FAMILY HISTORY:  No pertinent family history in first degree relatives      Review of Systems:  GENERAL/CONSTITUTIONAL:  + fatigue    HEAD, EYES, EARS, NOSE AND THROAT: Eyes – The patient denies pain, redness, loss of vision    CARDIOVASCULAR:  The patient denies chest pain, irregular heartbeats, sudden changes in heartbeat or palpitation    RESPIRATORY:  The patient denies chronic dry cough, coughing up blood, coughing up mucus, waking at night coughing or choking, repeated pneumonias, wheezing or night sweats.    GASTROINTESTINAL:  The patient denies decreased appetite, nausea, vomiting, vomiting blood or coffee ground material    GENITOURINARY: The patient denies difficult urination, pain or burning with urination    Physical Exam:    Vital Signs Last 24 Hrs  T(C): 37.4 (06 Feb 2020 05:55), Max: 37.4 (06 Feb 2020 05:55)  T(F): 99.4 (06 Feb 2020 05:55), Max: 99.4 (06 Feb 2020 05:55)  HR: 97 (06 Feb 2020 05:55) (97 - 97)  BP: 109/69 (06 Feb 2020 05:55) (103/70 - 115/78)  BP(mean): --  RR: 18 (06 Feb 2020 05:55) (17 - 18)  SpO2: 99% (06 Feb 2020 05:55) (99% - 100%)    GENERAL APPEARANCE: Well developed, well nourished, in no acute distress.    LUNGS: Auscultation of the lungs revealed normal breath sounds without any other adventitious sounds or rubs.    CARDIOVASCULAR: There was a regular rate and rhythm without any murmurs, gallops, rubs.     ABDOMEN: Soft and nontender with normal bowel sounds.     NEUROLOGIC: Alert and oriented x 3. Normal affect.     CBC                        7.9    5.15  )-----------( 162      ( 06 Feb 2020 02:57 )             23.7       Chemistry  02-06    133<L>  |  99  |  16  ----------------------------<  97  4.1   |  22  |  1.40<H>    Ca    9.0      06 Feb 2020 02:57  Phos  3.5     02-06  Mg     1.6     02-06      Coags:  PT/INR - ( 05 Feb 2020 11:04 )   PT: 13.8 SEC;   INR: 1.20     PTT - ( 05 Feb 2020 11:04 )  PTT:26.9 SEC

## 2020-02-06 NOTE — PROGRESS NOTE ADULT - ASSESSMENT
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 Anemia due to blood loss. -likely 2/2 vaginal bleed in the setting of uterine cancer.    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   Hgb 8.3 today s/p PRBC on 20 now 7.6- will ask for RT - Onc eval for ?? Rad- completed her RT treatment- IR will asses for ablatiobn as well due to need for repeat pRBC    70 yo  with stage IV high-grade uterine carcinosarcoma dx 3/2019, s/p carbo/taxol 7 cycles followed by single agent carboplatin last dose 2019 presenting for hypotension from [facility].    Patient initially admitted, found to have protruding uterine mass consistent with high grade uterine carcinosarcoma. CTAP at that time significant for metastatic lesions consistent with Stage IV disease.   Seen by Palliative  Patient WANTS CANCER TREATMENT

## 2020-02-06 NOTE — CONSULT NOTE ADULT - ASSESSMENT
68yo F with hx of uterine carcinosarcoma now with vaginal bleed referred to IR for embolization  - Pt will need anesthesia for procedure  - Consentable  - VSS  - Mild hyponatremia

## 2020-02-06 NOTE — CHART NOTE - NSCHARTNOTEFT_GEN_A_CORE
Patient Age: 69    Patient Gender: F     Procedure (including site/side if known): IR embolization     Diagnosis/Indication: uterine carcinosarcoma now with vaginal bleed referred to IR for embolization    Interventional Radiology Attending Physician: Fish     Ordering Attending Physician: Kaitlynn     Pertinent medical history: St. 4 uterine Ca, vaginal bleeding, anemia     Pertinent Labs: cbc, chem, coags ordered for am       Patient and Family aware? Yes     Ila Pitts PA-C     Contact:  P                         Date:      2020                              time: 6:30pm

## 2020-02-06 NOTE — PROGRESS NOTE ADULT - SUBJECTIVE AND OBJECTIVE BOX
Patient is a 69y old  Female who presents with a chief complaint of Sent from Onc office for hypotension (06 Feb 2020 12:52)      SUBJECTIVE / OVERNIGHT EVENTS:  patient seen with NP  Explained IR may do embolization of vessel to prevent bleed.  Also offered patient comfort care hospice option- patient stated " I want to live more years- let me keep trying with treatment of my cancer"- Pal care will see and also give information.    MEDICATIONS  (STANDING):  chlorhexidine 4% Liquid 1 Application(s) Topical daily  gabapentin 300 milliGRAM(s) Oral two times a day  lactulose Syrup 10 Gram(s) Oral every 6 hours  polyethylene glycol 3350 17 Gram(s) Oral daily  sodium chloride 0.9%. 1000 milliLiter(s) (75 mL/Hr) IV Continuous <Continuous>    MEDICATIONS  (PRN):  acetaminophen   Tablet .. 650 milliGRAM(s) Oral every 6 hours PRN Temp greater or equal to 38C (100.4F), Mild Pain (1 - 3), Moderate Pain (4 - 6)  bisacodyl 5 milliGRAM(s) Oral every 12 hours PRN Constipation  ondansetron Injectable 4 milliGRAM(s) IV Push every 8 hours PRN Nausea and/or Vomiting  oxyCODONE    IR 5 milliGRAM(s) Oral every 6 hours PRN moderate pain 4-6 and severe pain 7-10        Vital Signs Last 24 Hrs  T(C): 37.5 (06 Feb 2020 13:01), Max: 37.5 (06 Feb 2020 13:01)  T(F): 99.5 (06 Feb 2020 13:01), Max: 99.5 (06 Feb 2020 13:01)  HR: 95 (06 Feb 2020 13:01) (95 - 97)  BP: 104/64 (06 Feb 2020 13:01) (104/64 - 115/78)  BP(mean): --  RR: 18 (06 Feb 2020 05:55) (18 - 18)  SpO2: 100% (06 Feb 2020 13:01) (99% - 100%)      PHYSICAL EXAM:  GENERAL: NAD, well-developed  HEAD:  Atraumatic, Normocephalic  EYES: EOMI, PERRLA, conjunctiva and sclera clear  NECK: Supple, No JVD  CHEST/LUNG: Clear to auscultation bilaterally; No wheeze  HEART: Regular rate and rhythm; No murmurs, rubs, or gallops  ABDOMEN: Soft, Nontender, Nondistended; Bowel sounds present  EXTREMITIES:  2+ Peripheral Pulses, No clubbing, cyanosis, or edema  PSYCH: AAOx3  NEUROLOGY: non-focal  SKIN: No rashes or lesions    LABS:                        7.9    5.15  )-----------( 162      ( 06 Feb 2020 02:57 )             23.7     02-06    133<L>  |  99  |  16  ----------------------------<  97  4.1   |  22  |  1.40<H>    Ca    9.0      06 Feb 2020 02:57  Phos  3.5     02-06  Mg     1.6     02-06      PT/INR - ( 05 Feb 2020 11:04 )   PT: 13.8 SEC;   INR: 1.20          PTT - ( 05 Feb 2020 11:04 )  PTT:26.9 SEC          RADIOLOGY & ADDITIONAL TESTS:    Imaging Personally Reviewed:    Consultant(s) Notes Reviewed:      Care Discussed with Consultants/Other Providers:

## 2020-02-06 NOTE — PROGRESS NOTE ADULT - ATTENDING COMMENTS
await Ir input.  Hgb Stable  IVF for creat 1.4 await Ir input.  Hgb Stable  IVF for creat 1.4  patient seen with NP  Explained IR may do embolization of vessel to prevent bleed.  Also offered patient comfort care hospice option- patient stated " I want to live more years- let me keep trying with treatment of my cancer"- Pal care will see and also give information.

## 2020-02-07 ENCOUNTER — APPOINTMENT (OUTPATIENT)
Dept: HEMATOLOGY ONCOLOGY | Facility: CLINIC | Age: 70
End: 2020-02-07

## 2020-02-07 ENCOUNTER — APPOINTMENT (OUTPATIENT)
Dept: INFUSION THERAPY | Facility: HOSPITAL | Age: 70
End: 2020-02-07

## 2020-02-07 LAB
ANION GAP SERPL CALC-SCNC: 11 MMO/L — SIGNIFICANT CHANGE UP (ref 7–14)
ANISOCYTOSIS BLD QL: SLIGHT — SIGNIFICANT CHANGE UP
APTT BLD: 26.1 SEC — LOW (ref 27.5–36.3)
BASOPHILS # BLD AUTO: 0.01 K/UL — SIGNIFICANT CHANGE UP (ref 0–0.2)
BASOPHILS # BLD AUTO: 0.02 K/UL — SIGNIFICANT CHANGE UP (ref 0–0.2)
BASOPHILS NFR BLD AUTO: 0.2 % — SIGNIFICANT CHANGE UP (ref 0–2)
BASOPHILS NFR BLD AUTO: 0.4 % — SIGNIFICANT CHANGE UP (ref 0–2)
BASOPHILS NFR SPEC: 0 % — SIGNIFICANT CHANGE UP (ref 0–2)
BLASTS # FLD: 0 % — SIGNIFICANT CHANGE UP (ref 0–0)
BLD GP AB SCN SERPL QL: NEGATIVE — SIGNIFICANT CHANGE UP
BLD GP AB SCN SERPL QL: NEGATIVE — SIGNIFICANT CHANGE UP
BUN SERPL-MCNC: 14 MG/DL — SIGNIFICANT CHANGE UP (ref 7–23)
CALCIUM SERPL-MCNC: 8.9 MG/DL — SIGNIFICANT CHANGE UP (ref 8.4–10.5)
CHLORIDE SERPL-SCNC: 103 MMOL/L — SIGNIFICANT CHANGE UP (ref 98–107)
CO2 SERPL-SCNC: 22 MMOL/L — SIGNIFICANT CHANGE UP (ref 22–31)
CREAT SERPL-MCNC: 1.61 MG/DL — HIGH (ref 0.5–1.3)
EOSINOPHIL # BLD AUTO: 0.04 K/UL — SIGNIFICANT CHANGE UP (ref 0–0.5)
EOSINOPHIL # BLD AUTO: 0.04 K/UL — SIGNIFICANT CHANGE UP (ref 0–0.5)
EOSINOPHIL NFR BLD AUTO: 0.8 % — SIGNIFICANT CHANGE UP (ref 0–6)
EOSINOPHIL NFR BLD AUTO: 0.8 % — SIGNIFICANT CHANGE UP (ref 0–6)
EOSINOPHIL NFR FLD: 0.9 % — SIGNIFICANT CHANGE UP (ref 0–6)
GLUCOSE SERPL-MCNC: 90 MG/DL — SIGNIFICANT CHANGE UP (ref 70–99)
HCT VFR BLD CALC: 23.9 % — LOW (ref 34.5–45)
HCT VFR BLD CALC: 25.3 % — LOW (ref 34.5–45)
HGB BLD-MCNC: 7.7 G/DL — LOW (ref 11.5–15.5)
HGB BLD-MCNC: 8.4 G/DL — LOW (ref 11.5–15.5)
IMM GRANULOCYTES NFR BLD AUTO: 0.8 % — SIGNIFICANT CHANGE UP (ref 0–1.5)
IMM GRANULOCYTES NFR BLD AUTO: 0.8 % — SIGNIFICANT CHANGE UP (ref 0–1.5)
INR BLD: 1.2 — HIGH (ref 0.88–1.17)
LYMPHOCYTES # BLD AUTO: 0.71 K/UL — LOW (ref 1–3.3)
LYMPHOCYTES # BLD AUTO: 0.75 K/UL — LOW (ref 1–3.3)
LYMPHOCYTES # BLD AUTO: 13.9 % — SIGNIFICANT CHANGE UP (ref 13–44)
LYMPHOCYTES # BLD AUTO: 15.5 % — SIGNIFICANT CHANGE UP (ref 13–44)
LYMPHOCYTES NFR SPEC AUTO: 9.5 % — LOW (ref 13–44)
MACROCYTES BLD QL: SLIGHT — SIGNIFICANT CHANGE UP
MAGNESIUM SERPL-MCNC: 1.4 MG/DL — LOW (ref 1.6–2.6)
MCHC RBC-ENTMCNC: 30.3 PG — SIGNIFICANT CHANGE UP (ref 27–34)
MCHC RBC-ENTMCNC: 30.5 PG — SIGNIFICANT CHANGE UP (ref 27–34)
MCHC RBC-ENTMCNC: 32.2 % — SIGNIFICANT CHANGE UP (ref 32–36)
MCHC RBC-ENTMCNC: 33.2 % — SIGNIFICANT CHANGE UP (ref 32–36)
MCV RBC AUTO: 92 FL — SIGNIFICANT CHANGE UP (ref 80–100)
MCV RBC AUTO: 94.1 FL — SIGNIFICANT CHANGE UP (ref 80–100)
METAMYELOCYTES # FLD: 0 % — SIGNIFICANT CHANGE UP (ref 0–1)
MONOCYTES # BLD AUTO: 0.42 K/UL — SIGNIFICANT CHANGE UP (ref 0–0.9)
MONOCYTES # BLD AUTO: 0.5 K/UL — SIGNIFICANT CHANGE UP (ref 0–0.9)
MONOCYTES NFR BLD AUTO: 8.7 % — SIGNIFICANT CHANGE UP (ref 2–14)
MONOCYTES NFR BLD AUTO: 9.8 % — SIGNIFICANT CHANGE UP (ref 2–14)
MONOCYTES NFR BLD: 0.9 % — LOW (ref 2–9)
MYELOCYTES NFR BLD: 0 % — SIGNIFICANT CHANGE UP (ref 0–0)
NEUTROPHIL AB SER-ACNC: 80.9 % — HIGH (ref 43–77)
NEUTROPHILS # BLD AUTO: 3.56 K/UL — SIGNIFICANT CHANGE UP (ref 1.8–7.4)
NEUTROPHILS # BLD AUTO: 3.8 K/UL — SIGNIFICANT CHANGE UP (ref 1.8–7.4)
NEUTROPHILS NFR BLD AUTO: 73.8 % — SIGNIFICANT CHANGE UP (ref 43–77)
NEUTROPHILS NFR BLD AUTO: 74.5 % — SIGNIFICANT CHANGE UP (ref 43–77)
NEUTS BAND # BLD: 6.9 % — HIGH (ref 0–6)
NRBC # FLD: 0 K/UL — SIGNIFICANT CHANGE UP (ref 0–0)
NRBC # FLD: 0 K/UL — SIGNIFICANT CHANGE UP (ref 0–0)
OTHER - HEMATOLOGY %: 0 — SIGNIFICANT CHANGE UP
PHOSPHATE SERPL-MCNC: 2.9 MG/DL — SIGNIFICANT CHANGE UP (ref 2.5–4.5)
PLATELET # BLD AUTO: 145 K/UL — LOW (ref 150–400)
PLATELET # BLD AUTO: 169 K/UL — SIGNIFICANT CHANGE UP (ref 150–400)
PLATELET COUNT - ESTIMATE: SIGNIFICANT CHANGE UP
PMV BLD: 10 FL — SIGNIFICANT CHANGE UP (ref 7–13)
PMV BLD: 9.3 FL — SIGNIFICANT CHANGE UP (ref 7–13)
POIKILOCYTOSIS BLD QL AUTO: SLIGHT — SIGNIFICANT CHANGE UP
POLYCHROMASIA BLD QL SMEAR: SLIGHT — SIGNIFICANT CHANGE UP
POTASSIUM SERPL-MCNC: 3.6 MMOL/L — SIGNIFICANT CHANGE UP (ref 3.5–5.3)
POTASSIUM SERPL-SCNC: 3.6 MMOL/L — SIGNIFICANT CHANGE UP (ref 3.5–5.3)
PROMYELOCYTES # FLD: 0 % — SIGNIFICANT CHANGE UP (ref 0–0)
PROTHROM AB SERPL-ACNC: 13.4 SEC — HIGH (ref 9.8–13.1)
RBC # BLD: 2.54 M/UL — LOW (ref 3.8–5.2)
RBC # BLD: 2.75 M/UL — LOW (ref 3.8–5.2)
RBC # FLD: 15.9 % — HIGH (ref 10.3–14.5)
RBC # FLD: 16.1 % — HIGH (ref 10.3–14.5)
RH IG SCN BLD-IMP: POSITIVE — SIGNIFICANT CHANGE UP
RH IG SCN BLD-IMP: POSITIVE — SIGNIFICANT CHANGE UP
SODIUM SERPL-SCNC: 136 MMOL/L — SIGNIFICANT CHANGE UP (ref 135–145)
VARIANT LYMPHS # BLD: 0.9 % — SIGNIFICANT CHANGE UP
WBC # BLD: 4.83 K/UL — SIGNIFICANT CHANGE UP (ref 3.8–10.5)
WBC # BLD: 5.1 K/UL — SIGNIFICANT CHANGE UP (ref 3.8–10.5)
WBC # FLD AUTO: 4.83 K/UL — SIGNIFICANT CHANGE UP (ref 3.8–10.5)
WBC # FLD AUTO: 5.1 K/UL — SIGNIFICANT CHANGE UP (ref 3.8–10.5)

## 2020-02-07 PROCEDURE — 99232 SBSQ HOSP IP/OBS MODERATE 35: CPT | Mod: GC

## 2020-02-07 PROCEDURE — 99232 SBSQ HOSP IP/OBS MODERATE 35: CPT

## 2020-02-07 RX ORDER — MAGNESIUM SULFATE 500 MG/ML
1 VIAL (ML) INJECTION ONCE
Refills: 0 | Status: COMPLETED | OUTPATIENT
Start: 2020-02-07 | End: 2020-02-07

## 2020-02-07 RX ORDER — SODIUM CHLORIDE 9 MG/ML
1000 INJECTION INTRAMUSCULAR; INTRAVENOUS; SUBCUTANEOUS
Refills: 0 | Status: DISCONTINUED | OUTPATIENT
Start: 2020-02-07 | End: 2020-02-08

## 2020-02-07 RX ORDER — SODIUM CHLORIDE 9 MG/ML
1000 INJECTION INTRAMUSCULAR; INTRAVENOUS; SUBCUTANEOUS
Refills: 0 | Status: DISCONTINUED | OUTPATIENT
Start: 2020-02-07 | End: 2020-02-07

## 2020-02-07 RX ADMIN — OXYCODONE HYDROCHLORIDE 5 MILLIGRAM(S): 5 TABLET ORAL at 16:49

## 2020-02-07 RX ADMIN — Medication 100 GRAM(S): at 09:33

## 2020-02-07 RX ADMIN — SODIUM CHLORIDE 75 MILLILITER(S): 9 INJECTION INTRAMUSCULAR; INTRAVENOUS; SUBCUTANEOUS at 23:13

## 2020-02-07 RX ADMIN — OXYCODONE HYDROCHLORIDE 5 MILLIGRAM(S): 5 TABLET ORAL at 15:49

## 2020-02-07 RX ADMIN — CHLORHEXIDINE GLUCONATE 1 APPLICATION(S): 213 SOLUTION TOPICAL at 12:40

## 2020-02-07 RX ADMIN — SODIUM CHLORIDE 75 MILLILITER(S): 9 INJECTION INTRAMUSCULAR; INTRAVENOUS; SUBCUTANEOUS at 09:34

## 2020-02-07 RX ADMIN — OXYCODONE HYDROCHLORIDE 5 MILLIGRAM(S): 5 TABLET ORAL at 22:35

## 2020-02-07 RX ADMIN — OXYCODONE HYDROCHLORIDE 5 MILLIGRAM(S): 5 TABLET ORAL at 23:35

## 2020-02-07 RX ADMIN — SODIUM CHLORIDE 75 MILLILITER(S): 9 INJECTION INTRAMUSCULAR; INTRAVENOUS; SUBCUTANEOUS at 00:49

## 2020-02-07 NOTE — PROGRESS NOTE ADULT - ATTENDING COMMENTS
Metastatic HG uterine carcinosarcoma s/p carbo/taxol admitted with hypotension and bleeding from cervical mass. S/p palliative RT by rad onc. Plan for embolization procedure by IR. As discussed with Dr. Garcia, patient's primary oncologist, further chemotherapy likely not beneficial in this setting. Continue goals of care discussions and palliative care evaluation for transition to hospice.

## 2020-02-07 NOTE — PROGRESS NOTE ADULT - ASSESSMENT
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 Anemia due to blood loss. -likely 2/2 vaginal bleed in the setting of uterine cancer.     Seen by Palliative  Patient WANTS CANCER TREATMENT

## 2020-02-07 NOTE — PROGRESS NOTE ADULT - SUBJECTIVE AND OBJECTIVE BOX
[facility] Division of Hospital Medicine  Fanny Salgado MD  Pager #[phone]    Patient is a 69y old  Female who presents with a chief complaint of Sent from Onc office for hypotension (06 Feb 2020 12:52)    SUBJECTIVE / OVERNIGHT EVENTS: No acute events. Pt denies noticing further bleeding. No lightheadedness, dizziness, chest pain, shortness of breath, headache, nausea, vomiting, fever or chills.     MEDICATIONS  (STANDING):  chlorhexidine 4% Liquid 1 Application(s) Topical daily  gabapentin 300 milliGRAM(s) Oral two times a day  lactulose Syrup 10 Gram(s) Oral every 6 hours  polyethylene glycol 3350 17 Gram(s) Oral daily  sodium chloride 0.9%. 1000 milliLiter(s) (75 mL/Hr) IV Continuous <Continuous>    MEDICATIONS  (PRN):  acetaminophen   Tablet .. 650 milliGRAM(s) Oral every 6 hours PRN Temp greater or equal to 38C (100.4F), Mild Pain (1 - 3), Moderate Pain (4 - 6)  bisacodyl 5 milliGRAM(s) Oral every 12 hours PRN Constipation  ondansetron Injectable 4 milliGRAM(s) IV Push every 8 hours PRN Nausea and/or Vomiting  oxyCODONE    IR 5 milliGRAM(s) Oral every 6 hours PRN moderate pain 4-6 and severe pain 7-10    PHYSICAL EXAM:  Vital Signs Last 24 Hrs  T(C): 36.9 (07 Feb 2020 13:10), Max: 37.3 (06 Feb 2020 22:02)  T(F): 98.4 (07 Feb 2020 13:10), Max: 99.2 (06 Feb 2020 22:02)  HR: 93 (07 Feb 2020 13:10) (93 - 102)  BP: 106/71 (07 Feb 2020 13:10) (106/71 - 117/79)  BP(mean): --  RR: 18 (07 Feb 2020 05:32) (18 - 19)  SpO2: 100% (07 Feb 2020 13:10) (97% - 100%)    CONSTITUTIONAL: NAD, laying in bed, speaking in full sentences  EYES: conjunctiva and sclera clear  ENMT: Moist oral mucosa  NECK: Supple, no thyromegaly  RESPIRATORY: Normal respiratory effort; lungs are clear to auscultation bilaterally  CARDIOVASCULAR: Regular rate and rhythm, normal S1 and S2, no murmur/rub/gallop; No lower extremity edema; Peripheral pulses are 2+ bilaterally  ABDOMEN: Nontender to palpation, normoactive bowel sounds, no rebound/guarding  PSYCH: calm  NEUROLOGY: no focal deficits  SKIN: No rashes; no palpable lesions    LABS:                        8.4    5.10  )-----------( 145      ( 07 Feb 2020 06:00 )             25.3     02-07    136  |  103  |  14  ----------------------------<  90  3.6   |  22  |  1.61<H>    Ca    8.9      07 Feb 2020 06:00  Phos  2.9     02-07  Mg     1.4     02-07      PT/INR - ( 07 Feb 2020 06:00 )   PT: 13.4 SEC;   INR: 1.20     PTT - ( 07 Feb 2020 06:00 )  PTT:26.1 SEC

## 2020-02-07 NOTE — PROGRESS NOTE ADULT - SUBJECTIVE AND OBJECTIVE BOX
Hg remains stable. However, 2/2 STEVEN IR procedure was held.  	  Vital Signs Last 24 Hrs  T(C): 36.9 (07 Feb 2020 13:10), Max: 37.3 (06 Feb 2020 22:02)  T(F): 98.4 (07 Feb 2020 13:10), Max: 99.2 (06 Feb 2020 22:02)  HR: 93 (07 Feb 2020 13:10) (93 - 102)  BP: 106/71 (07 Feb 2020 13:10) (106/71 - 117/79)  BP(mean): --  RR: 18 (07 Feb 2020 05:32) (18 - 19)  SpO2: 100% (07 Feb 2020 13:10) (97% - 100%)  PHYSICAL EXAM:    GENERAL: NAD, AAOx3   HEAD:  NC/AT  EYES: EOMI, PERRLA, no scleral icterus  HEENT: Moist mucous membranes  LUNG: Clear to auscultation bilaterally; No rales, rhonchi, wheezing, or rubs  HEART: RRR; No murmurs, rubs, or gallops  ABDOMEN: +BS, ST/ND/NT  EXTREMITIES:  2+ Peripheral Pulses, No clubbing, cyanosis, or edema  LAD: no palpable adenopathy  02-07    136  |  103  |  14  ----------------------------<  90  3.6   |  22  |  1.61<H>    Ca    8.9      07 Feb 2020 06:00  Phos  2.9     02-07  Mg     1.4     02-07        CBC Full  -  ( 07 Feb 2020 06:00 )  WBC Count : 5.10 K/uL  RBC Count : 2.75 M/uL  Hemoglobin : 8.4 g/dL  Hematocrit : 25.3 %  Platelet Count - Automated : 145 K/uL  Mean Cell Volume : 92.0 fL  Mean Cell Hemoglobin : 30.5 pg  Mean Cell Hemoglobin Concentration : 33.2 %  Auto Neutrophil # : 3.80 K/uL  Auto Lymphocyte # : 0.71 K/uL  Auto Monocyte # : 0.50 K/uL  Auto Eosinophil # : 0.04 K/uL  Auto Basophil # : 0.01 K/uL  Auto Neutrophil % : 74.5 %  Auto Lymphocyte % : 13.9 %  Auto Monocyte % : 9.8 %  Auto Eosinophil % : 0.8 %  Auto Basophil % : 0.2 %          PT/INR - ( 07 Feb 2020 06:00 )   PT: 13.4 SEC;   INR: 1.20          PTT - ( 07 Feb 2020 06:00 )  PTT:26.1 SEC

## 2020-02-07 NOTE — PROGRESS NOTE ADULT - ASSESSMENT
70 yo  with stage IV high-grade uterine carcinosarcoma dx 3/2019, s/p carbo/taxol 7 cycles followed by single agent carboplatin last dose 2019 [facility]    Stage IV uterine carcinosarcoma  - CT C/A/P WO showing increased size of endometrial and cervical mass along with a new left para-aortic mass consistent with metastatic disease  - bleeding coming from mass, no GYN intervention at this time.    - s/p 5/5 fractions RT  - at this time, has stopped bleeding and hg stable  - IR was planning on embolization today, procedure held for STEVEN  - c/w IVF  - if remains stable, can follow up outpt with Dr. Garcia for evaluation for more treatment  - if starts bleeding again, will need to re-evaluate as may not be a candidate for more treatment.   - no plans for inpatient chemotherapy  - bowel regimen  - transfuse if hg < 7.0 or pt symptomatic    An Morillo DO  Hematology/Oncology Fellow, PGY5  Pager: [phone]    An Morillo DO  Hematology/Oncology Fellow, PGY5  Pager: [phone] 68 yo  with stage IV high-grade uterine carcinosarcoma dx 3/2019, s/p carbo/taxol 7 cycles followed by single agent carboplatin last dose 2019 presenting for hypotension from [facility].    Stage IV uterine carcinosarcoma  - CT C/A/P WO showing increased size of endometrial and cervical mass along with a new left para-aortic mass consistent with metastatic disease  - bleeding coming from mass, no GYN intervention at this time.    - s/p 5/5 fractions RT  - at this time, has stopped bleeding and hg stable  - IR was planning on embolization today, procedure held for STEVEN  - c/w IVF  - spoke with Dr. Garcia.  She does not think at this time chemotherapy will provide any more benefit.  Spoke with pt.  We agree with IR embolization next week, and then re-eval with palliative care.  - bowel regimen  - transfuse if hg < 7.0 or pt symptomatic    An Morillo DO  Hematology/Oncology Fellow, PGY5  Pager: [phone]    An Morillo DO  Hematology/Oncology Fellow, PGY5  Pager: [phone]

## 2020-02-08 LAB
ANION GAP SERPL CALC-SCNC: 13 MMO/L — SIGNIFICANT CHANGE UP (ref 7–14)
BASOPHILS # BLD AUTO: 0.01 K/UL — SIGNIFICANT CHANGE UP (ref 0–0.2)
BASOPHILS NFR BLD AUTO: 0.2 % — SIGNIFICANT CHANGE UP (ref 0–2)
BUN SERPL-MCNC: 15 MG/DL — SIGNIFICANT CHANGE UP (ref 7–23)
CALCIUM SERPL-MCNC: 8.5 MG/DL — SIGNIFICANT CHANGE UP (ref 8.4–10.5)
CHLORIDE SERPL-SCNC: 104 MMOL/L — SIGNIFICANT CHANGE UP (ref 98–107)
CO2 SERPL-SCNC: 19 MMOL/L — LOW (ref 22–31)
CREAT SERPL-MCNC: 1.5 MG/DL — HIGH (ref 0.5–1.3)
EOSINOPHIL # BLD AUTO: 0.05 K/UL — SIGNIFICANT CHANGE UP (ref 0–0.5)
EOSINOPHIL NFR BLD AUTO: 1.2 % — SIGNIFICANT CHANGE UP (ref 0–6)
GLUCOSE SERPL-MCNC: 91 MG/DL — SIGNIFICANT CHANGE UP (ref 70–99)
HCT VFR BLD CALC: 23.1 % — LOW (ref 34.5–45)
HGB BLD-MCNC: 7.3 G/DL — LOW (ref 11.5–15.5)
IMM GRANULOCYTES NFR BLD AUTO: 1.2 % — SIGNIFICANT CHANGE UP (ref 0–1.5)
LYMPHOCYTES # BLD AUTO: 0.72 K/UL — LOW (ref 1–3.3)
LYMPHOCYTES # BLD AUTO: 17.6 % — SIGNIFICANT CHANGE UP (ref 13–44)
MAGNESIUM SERPL-MCNC: 1.7 MG/DL — SIGNIFICANT CHANGE UP (ref 1.6–2.6)
MCHC RBC-ENTMCNC: 30 PG — SIGNIFICANT CHANGE UP (ref 27–34)
MCHC RBC-ENTMCNC: 31.6 % — LOW (ref 32–36)
MCV RBC AUTO: 95.1 FL — SIGNIFICANT CHANGE UP (ref 80–100)
MONOCYTES # BLD AUTO: 0.44 K/UL — SIGNIFICANT CHANGE UP (ref 0–0.9)
MONOCYTES NFR BLD AUTO: 10.8 % — SIGNIFICANT CHANGE UP (ref 2–14)
NEUTROPHILS # BLD AUTO: 2.81 K/UL — SIGNIFICANT CHANGE UP (ref 1.8–7.4)
NEUTROPHILS NFR BLD AUTO: 69 % — SIGNIFICANT CHANGE UP (ref 43–77)
NRBC # FLD: 0 K/UL — SIGNIFICANT CHANGE UP (ref 0–0)
PHOSPHATE SERPL-MCNC: 2.8 MG/DL — SIGNIFICANT CHANGE UP (ref 2.5–4.5)
PLATELET # BLD AUTO: 136 K/UL — LOW (ref 150–400)
PMV BLD: 9 FL — SIGNIFICANT CHANGE UP (ref 7–13)
POTASSIUM SERPL-MCNC: 3.5 MMOL/L — SIGNIFICANT CHANGE UP (ref 3.5–5.3)
POTASSIUM SERPL-SCNC: 3.5 MMOL/L — SIGNIFICANT CHANGE UP (ref 3.5–5.3)
RBC # BLD: 2.43 M/UL — LOW (ref 3.8–5.2)
RBC # FLD: 16.1 % — HIGH (ref 10.3–14.5)
SODIUM SERPL-SCNC: 136 MMOL/L — SIGNIFICANT CHANGE UP (ref 135–145)
WBC # BLD: 4.08 K/UL — SIGNIFICANT CHANGE UP (ref 3.8–10.5)
WBC # FLD AUTO: 4.08 K/UL — SIGNIFICANT CHANGE UP (ref 3.8–10.5)

## 2020-02-08 PROCEDURE — 99232 SBSQ HOSP IP/OBS MODERATE 35: CPT

## 2020-02-08 RX ORDER — ALTEPLASE 100 MG
2 KIT INTRAVENOUS ONCE
Refills: 0 | Status: COMPLETED | OUTPATIENT
Start: 2020-02-08 | End: 2020-02-08

## 2020-02-08 RX ADMIN — ALTEPLASE 2 MILLIGRAM(S): KIT at 22:52

## 2020-02-08 RX ADMIN — OXYCODONE HYDROCHLORIDE 5 MILLIGRAM(S): 5 TABLET ORAL at 20:44

## 2020-02-08 RX ADMIN — CHLORHEXIDINE GLUCONATE 1 APPLICATION(S): 213 SOLUTION TOPICAL at 12:21

## 2020-02-08 RX ADMIN — OXYCODONE HYDROCHLORIDE 5 MILLIGRAM(S): 5 TABLET ORAL at 21:44

## 2020-02-08 RX ADMIN — OXYCODONE HYDROCHLORIDE 5 MILLIGRAM(S): 5 TABLET ORAL at 11:41

## 2020-02-08 RX ADMIN — ALTEPLASE 2 MILLIGRAM(S): KIT at 20:09

## 2020-02-08 RX ADMIN — OXYCODONE HYDROCHLORIDE 5 MILLIGRAM(S): 5 TABLET ORAL at 10:41

## 2020-02-08 NOTE — PROGRESS NOTE ADULT - SUBJECTIVE AND OBJECTIVE BOX
[facility] Division of Hospital Medicine  Fanny Salgado MD  Pager #[phone]    Patient is a 69y old  Female who presents with a chief complaint of Sent from Onc office for hypotension (06 Feb 2020 12:52)    SUBJECTIVE / OVERNIGHT EVENTS: No acute events. No lightheadedness, dizziness, chest pain, shortness of breath, headache, nausea, vomiting, fever or chills.     MEDICATIONS  (STANDING):  chlorhexidine 4% Liquid 1 Application(s) Topical daily  gabapentin 300 milliGRAM(s) Oral two times a day  lactulose Syrup 10 Gram(s) Oral every 6 hours  polyethylene glycol 3350 17 Gram(s) Oral daily  sodium chloride 0.9%. 1000 milliLiter(s) (75 mL/Hr) IV Continuous <Continuous>    MEDICATIONS  (PRN):  acetaminophen   Tablet .. 650 milliGRAM(s) Oral every 6 hours PRN Temp greater or equal to 38C (100.4F), Mild Pain (1 - 3), Moderate Pain (4 - 6)  bisacodyl 5 milliGRAM(s) Oral every 12 hours PRN Constipation  ondansetron Injectable 4 milliGRAM(s) IV Push every 8 hours PRN Nausea and/or Vomiting  oxyCODONE    IR 5 milliGRAM(s) Oral every 6 hours PRN moderate pain 4-6 and severe pain 7-10    Vital Signs Last 24 Hrs  T(C): 36.7 (08 Feb 2020 05:59), Max: 37.5 (07 Feb 2020 20:39)  T(F): 98.1 (08 Feb 2020 05:59), Max: 99.5 (07 Feb 2020 20:39)  HR: 93 (08 Feb 2020 05:59) (93 - 96)  BP: 102/69 (08 Feb 2020 05:59) (102/69 - 112/66)  BP(mean): --  RR: 15 (08 Feb 2020 05:59) (14 - 15)  SpO2: 100% (08 Feb 2020 05:59) (95% - 100%)    I&O's Detail    CAPILLARY BLOOD GLUCOSE    CONSTITUTIONAL: NAD, laying in bed  EYES: conjunctiva and sclera clear  ENMT: Moist oral mucosa  NECK: Supple, no thyromegaly  RESPIRATORY: Normal respiratory effort; lungs are clear to auscultation bilaterally  CARDIOVASCULAR: Regular rate and rhythm, normal S1 and S2, no murmur/rub/gallop; No lower extremity edema; Peripheral pulses are 2+ bilaterally  ABDOMEN: Nontender to palpation, normoactive bowel sounds, no rebound/guarding  PSYCH: calm  NEUROLOGY: no focal deficits  SKIN: No rashes; no palpable lesions    LABS:                         7.3    4.08  )-----------( 136      ( 08 Feb 2020 05:58 )             23.1     02-08    136  |  104  |  15  ----------------------------<  91  3.5   |  19<L>  |  1.50<H>    Ca    8.5      08 Feb 2020 05:58  Phos  2.8     02-08  Mg     1.7     02-08      PT/INR - ( 07 Feb 2020 06:00 )   PT: 13.4 SEC;   INR: 1.20          PTT - ( 07 Feb 2020 06:00 )  PTT:26.1 SEC      RADIOLOGY, EKG & ADDITIONAL TESTS: Reviewed.

## 2020-02-09 LAB
ALBUMIN SERPL ELPH-MCNC: 2.6 G/DL — LOW (ref 3.3–5)
ANION GAP SERPL CALC-SCNC: 11 MMO/L — SIGNIFICANT CHANGE UP (ref 7–14)
BASOPHILS # BLD AUTO: 0 K/UL — SIGNIFICANT CHANGE UP (ref 0–0.2)
BASOPHILS # BLD AUTO: 0.01 K/UL — SIGNIFICANT CHANGE UP (ref 0–0.2)
BASOPHILS # BLD AUTO: 0.01 K/UL — SIGNIFICANT CHANGE UP (ref 0–0.2)
BASOPHILS NFR BLD AUTO: 0 % — SIGNIFICANT CHANGE UP (ref 0–2)
BASOPHILS NFR BLD AUTO: 0.2 % — SIGNIFICANT CHANGE UP (ref 0–2)
BASOPHILS NFR BLD AUTO: 0.2 % — SIGNIFICANT CHANGE UP (ref 0–2)
BUN SERPL-MCNC: 15 MG/DL — SIGNIFICANT CHANGE UP (ref 7–23)
CALCIUM SERPL-MCNC: 8.3 MG/DL — LOW (ref 8.4–10.5)
CHLORIDE SERPL-SCNC: 104 MMOL/L — SIGNIFICANT CHANGE UP (ref 98–107)
CO2 SERPL-SCNC: 21 MMOL/L — LOW (ref 22–31)
CREAT SERPL-MCNC: 1.44 MG/DL — HIGH (ref 0.5–1.3)
EOSINOPHIL # BLD AUTO: 0.03 K/UL — SIGNIFICANT CHANGE UP (ref 0–0.5)
EOSINOPHIL # BLD AUTO: 0.05 K/UL — SIGNIFICANT CHANGE UP (ref 0–0.5)
EOSINOPHIL # BLD AUTO: 0.07 K/UL — SIGNIFICANT CHANGE UP (ref 0–0.5)
EOSINOPHIL NFR BLD AUTO: 0.6 % — SIGNIFICANT CHANGE UP (ref 0–6)
EOSINOPHIL NFR BLD AUTO: 1 % — SIGNIFICANT CHANGE UP (ref 0–6)
EOSINOPHIL NFR BLD AUTO: 1.5 % — SIGNIFICANT CHANGE UP (ref 0–6)
GLUCOSE SERPL-MCNC: 105 MG/DL — HIGH (ref 70–99)
HCT VFR BLD CALC: 20 % — CRITICAL LOW (ref 34.5–45)
HCT VFR BLD CALC: 20.6 % — CRITICAL LOW (ref 34.5–45)
HCT VFR BLD CALC: 20.6 % — CRITICAL LOW (ref 34.5–45)
HCT VFR BLD CALC: 22.4 % — LOW (ref 34.5–45)
HGB BLD-MCNC: 6.6 G/DL — CRITICAL LOW (ref 11.5–15.5)
HGB BLD-MCNC: 6.6 G/DL — CRITICAL LOW (ref 11.5–15.5)
HGB BLD-MCNC: 6.7 G/DL — CRITICAL LOW (ref 11.5–15.5)
HGB BLD-MCNC: 7.2 G/DL — LOW (ref 11.5–15.5)
IMM GRANULOCYTES NFR BLD AUTO: 0.6 % — SIGNIFICANT CHANGE UP (ref 0–1.5)
IMM GRANULOCYTES NFR BLD AUTO: 0.7 % — SIGNIFICANT CHANGE UP (ref 0–1.5)
IMM GRANULOCYTES NFR BLD AUTO: 1.3 % — SIGNIFICANT CHANGE UP (ref 0–1.5)
LYMPHOCYTES # BLD AUTO: 0.62 K/UL — LOW (ref 1–3.3)
LYMPHOCYTES # BLD AUTO: 0.75 K/UL — LOW (ref 1–3.3)
LYMPHOCYTES # BLD AUTO: 0.82 K/UL — LOW (ref 1–3.3)
LYMPHOCYTES # BLD AUTO: 11.6 % — LOW (ref 13–44)
LYMPHOCYTES # BLD AUTO: 15.7 % — SIGNIFICANT CHANGE UP (ref 13–44)
LYMPHOCYTES # BLD AUTO: 16.4 % — SIGNIFICANT CHANGE UP (ref 13–44)
MAGNESIUM SERPL-MCNC: 1.6 MG/DL — SIGNIFICANT CHANGE UP (ref 1.6–2.6)
MCHC RBC-ENTMCNC: 28.6 PG — SIGNIFICANT CHANGE UP (ref 27–34)
MCHC RBC-ENTMCNC: 29.6 PG — SIGNIFICANT CHANGE UP (ref 27–34)
MCHC RBC-ENTMCNC: 29.6 PG — SIGNIFICANT CHANGE UP (ref 27–34)
MCHC RBC-ENTMCNC: 29.8 PG — SIGNIFICANT CHANGE UP (ref 27–34)
MCHC RBC-ENTMCNC: 32 % — SIGNIFICANT CHANGE UP (ref 32–36)
MCHC RBC-ENTMCNC: 32 % — SIGNIFICANT CHANGE UP (ref 32–36)
MCHC RBC-ENTMCNC: 32.1 % — SIGNIFICANT CHANGE UP (ref 32–36)
MCHC RBC-ENTMCNC: 33.5 % — SIGNIFICANT CHANGE UP (ref 32–36)
MCV RBC AUTO: 88.9 FL — SIGNIFICANT CHANGE UP (ref 80–100)
MCV RBC AUTO: 88.9 FL — SIGNIFICANT CHANGE UP (ref 80–100)
MCV RBC AUTO: 92.4 FL — SIGNIFICANT CHANGE UP (ref 80–100)
MCV RBC AUTO: 92.4 FL — SIGNIFICANT CHANGE UP (ref 80–100)
MONOCYTES # BLD AUTO: 0.55 K/UL — SIGNIFICANT CHANGE UP (ref 0–0.9)
MONOCYTES # BLD AUTO: 0.56 K/UL — SIGNIFICANT CHANGE UP (ref 0–0.9)
MONOCYTES # BLD AUTO: 0.61 K/UL — SIGNIFICANT CHANGE UP (ref 0–0.9)
MONOCYTES NFR BLD AUTO: 11.2 % — SIGNIFICANT CHANGE UP (ref 2–14)
MONOCYTES NFR BLD AUTO: 11.4 % — SIGNIFICANT CHANGE UP (ref 2–14)
MONOCYTES NFR BLD AUTO: 11.5 % — SIGNIFICANT CHANGE UP (ref 2–14)
NEUTROPHILS # BLD AUTO: 3.34 K/UL — SIGNIFICANT CHANGE UP (ref 1.8–7.4)
NEUTROPHILS # BLD AUTO: 3.53 K/UL — SIGNIFICANT CHANGE UP (ref 1.8–7.4)
NEUTROPHILS # BLD AUTO: 4.04 K/UL — SIGNIFICANT CHANGE UP (ref 1.8–7.4)
NEUTROPHILS NFR BLD AUTO: 70 % — SIGNIFICANT CHANGE UP (ref 43–77)
NEUTROPHILS NFR BLD AUTO: 70.6 % — SIGNIFICANT CHANGE UP (ref 43–77)
NEUTROPHILS NFR BLD AUTO: 75.5 % — SIGNIFICANT CHANGE UP (ref 43–77)
NRBC # FLD: 0 K/UL — SIGNIFICANT CHANGE UP (ref 0–0)
PHOSPHATE SERPL-MCNC: 2.9 MG/DL — SIGNIFICANT CHANGE UP (ref 2.5–4.5)
PLATELET # BLD AUTO: 112 K/UL — LOW (ref 150–400)
PLATELET # BLD AUTO: 123 K/UL — LOW (ref 150–400)
PLATELET # BLD AUTO: 126 K/UL — LOW (ref 150–400)
PLATELET # BLD AUTO: 126 K/UL — LOW (ref 150–400)
PMV BLD: 9.2 FL — SIGNIFICANT CHANGE UP (ref 7–13)
PMV BLD: 9.3 FL — SIGNIFICANT CHANGE UP (ref 7–13)
POTASSIUM SERPL-MCNC: 3.9 MMOL/L — SIGNIFICANT CHANGE UP (ref 3.5–5.3)
POTASSIUM SERPL-SCNC: 3.9 MMOL/L — SIGNIFICANT CHANGE UP (ref 3.5–5.3)
RBC # BLD: 2.23 M/UL — LOW (ref 3.8–5.2)
RBC # BLD: 2.23 M/UL — LOW (ref 3.8–5.2)
RBC # BLD: 2.25 M/UL — LOW (ref 3.8–5.2)
RBC # BLD: 2.52 M/UL — LOW (ref 3.8–5.2)
RBC # FLD: 15.9 % — HIGH (ref 10.3–14.5)
RBC # FLD: 15.9 % — HIGH (ref 10.3–14.5)
RBC # FLD: 16.7 % — HIGH (ref 10.3–14.5)
RBC # FLD: 17.2 % — HIGH (ref 10.3–14.5)
SODIUM SERPL-SCNC: 136 MMOL/L — SIGNIFICANT CHANGE UP (ref 135–145)
WBC # BLD: 4.77 K/UL — SIGNIFICANT CHANGE UP (ref 3.8–10.5)
WBC # BLD: 5 K/UL — SIGNIFICANT CHANGE UP (ref 3.8–10.5)
WBC # BLD: 5 K/UL — SIGNIFICANT CHANGE UP (ref 3.8–10.5)
WBC # BLD: 5.35 K/UL — SIGNIFICANT CHANGE UP (ref 3.8–10.5)
WBC # FLD AUTO: 4.77 K/UL — SIGNIFICANT CHANGE UP (ref 3.8–10.5)
WBC # FLD AUTO: 5 K/UL — SIGNIFICANT CHANGE UP (ref 3.8–10.5)
WBC # FLD AUTO: 5 K/UL — SIGNIFICANT CHANGE UP (ref 3.8–10.5)
WBC # FLD AUTO: 5.35 K/UL — SIGNIFICANT CHANGE UP (ref 3.8–10.5)

## 2020-02-09 PROCEDURE — 99232 SBSQ HOSP IP/OBS MODERATE 35: CPT

## 2020-02-09 RX ADMIN — OXYCODONE HYDROCHLORIDE 5 MILLIGRAM(S): 5 TABLET ORAL at 21:48

## 2020-02-09 RX ADMIN — OXYCODONE HYDROCHLORIDE 5 MILLIGRAM(S): 5 TABLET ORAL at 09:59

## 2020-02-09 RX ADMIN — OXYCODONE HYDROCHLORIDE 5 MILLIGRAM(S): 5 TABLET ORAL at 08:58

## 2020-02-09 RX ADMIN — OXYCODONE HYDROCHLORIDE 5 MILLIGRAM(S): 5 TABLET ORAL at 20:48

## 2020-02-09 RX ADMIN — CHLORHEXIDINE GLUCONATE 1 APPLICATION(S): 213 SOLUTION TOPICAL at 12:10

## 2020-02-09 NOTE — PROGRESS NOTE ADULT - SUBJECTIVE AND OBJECTIVE BOX
[facility] Division of Hospital Medicine  Fanny Salgado MD  Pager #[phone]    Patient is a 69y old  Female who presents with a chief complaint of Sent from Onc office for hypotension (06 Feb 2020 12:52)    SUBJECTIVE / OVERNIGHT EVENTS: Hg dropped to 6.6, tranfused 1 unit pRBC. This AM hg 7.2. Patient noticed some clots. Reports yesterday she also experienced some dizziness, which has since resolved. No current pain or discomfort. No nausea, no vomiting.     MEDICATIONS  (STANDING):  chlorhexidine 4% Liquid 1 Application(s) Topical daily  gabapentin 300 milliGRAM(s) Oral two times a day  lactulose Syrup 10 Gram(s) Oral every 6 hours  polyethylene glycol 3350 17 Gram(s) Oral daily    MEDICATIONS  (PRN):  acetaminophen   Tablet .. 650 milliGRAM(s) Oral every 6 hours PRN Temp greater or equal to 38C (100.4F), Mild Pain (1 - 3), Moderate Pain (4 - 6)  bisacodyl 5 milliGRAM(s) Oral every 12 hours PRN Constipation  ondansetron Injectable 4 milliGRAM(s) IV Push every 8 hours PRN Nausea and/or Vomiting  oxyCODONE    IR 5 milliGRAM(s) Oral every 6 hours PRN moderate pain 4-6 and severe pain 7-10    Vital Signs Last 24 Hrs  T(C): 37 (09 Feb 2020 05:30), Max: 37.6 (08 Feb 2020 21:41)  T(F): 98.6 (09 Feb 2020 05:30), Max: 99.7 (08 Feb 2020 21:41)  HR: 99 (09 Feb 2020 05:30) (99 - 100)  BP: 105/67 (09 Feb 2020 05:30) (105/66 - 112/66)  BP(mean): --  RR: 17 (09 Feb 2020 05:30) (16 - 18)  SpO2: 100% (09 Feb 2020 05:30) (100% - 100%)    I&O's Detail    CAPILLARY BLOOD GLUCOSE    CONSTITUTIONAL: NAD, laying in bed  EYES: conjunctiva and sclera clear  ENMT: Moist oral mucosa  NECK: Supple, no thyromegaly  RESPIRATORY: Normal respiratory effort; lungs are clear to auscultation bilaterally  CARDIOVASCULAR: Regular rate and rhythm, normal S1 and S2, no murmur/rub/gallop; No lower extremity edema; Peripheral pulses are 2+ bilaterally  ABDOMEN: Nontender to palpation, normoactive bowel sounds, no rebound/guarding  PSYCH: calm  NEUROLOGY: no focal deficits  SKIN: No rashes; no palpable lesions    LABS:                         7.2    5.35  )-----------( 123      ( 09 Feb 2020 08:00 )             22.4     02-09    136  |  104  |  15  ----------------------------<  105<H>  3.9   |  21<L>  |  1.44<H>    Ca    8.3<L>      09 Feb 2020 08:00  Phos  2.9     02-09  Mg     1.6     02-09    TPro  x   /  Alb  2.6<L>  /  TBili  x   /  DBili  x   /  AST  x   /  ALT  x   /  AlkPhos  x   02-09    RADIOLOGY, EKG & ADDITIONAL TESTS: Reviewed. No action was needed

## 2020-02-09 NOTE — PROGRESS NOTE ADULT - ASSESSMENT
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 Anemia due to blood loss. -likely 2/2 vaginal bleed in the setting of uterine cancer.     Seen by Palliative  Patient WANTS CANCER TREATMENT

## 2020-02-10 LAB
ANION GAP SERPL CALC-SCNC: 10 MMO/L — SIGNIFICANT CHANGE UP (ref 7–14)
BASOPHILS # BLD AUTO: 0.01 K/UL — SIGNIFICANT CHANGE UP (ref 0–0.2)
BASOPHILS # BLD AUTO: 0.01 K/UL — SIGNIFICANT CHANGE UP (ref 0–0.2)
BASOPHILS NFR BLD AUTO: 0.2 % — SIGNIFICANT CHANGE UP (ref 0–2)
BASOPHILS NFR BLD AUTO: 0.2 % — SIGNIFICANT CHANGE UP (ref 0–2)
BLD GP AB SCN SERPL QL: NEGATIVE — SIGNIFICANT CHANGE UP
BUN SERPL-MCNC: 13 MG/DL — SIGNIFICANT CHANGE UP (ref 7–23)
CALCIUM SERPL-MCNC: 8.7 MG/DL — SIGNIFICANT CHANGE UP (ref 8.4–10.5)
CHLORIDE SERPL-SCNC: 104 MMOL/L — SIGNIFICANT CHANGE UP (ref 98–107)
CO2 SERPL-SCNC: 23 MMOL/L — SIGNIFICANT CHANGE UP (ref 22–31)
CREAT SERPL-MCNC: 1.46 MG/DL — HIGH (ref 0.5–1.3)
EOSINOPHIL # BLD AUTO: 0.06 K/UL — SIGNIFICANT CHANGE UP (ref 0–0.5)
EOSINOPHIL # BLD AUTO: 0.07 K/UL — SIGNIFICANT CHANGE UP (ref 0–0.5)
EOSINOPHIL NFR BLD AUTO: 1.3 % — SIGNIFICANT CHANGE UP (ref 0–6)
EOSINOPHIL NFR BLD AUTO: 1.7 % — SIGNIFICANT CHANGE UP (ref 0–6)
GLUCOSE SERPL-MCNC: 87 MG/DL — SIGNIFICANT CHANGE UP (ref 70–99)
HCT VFR BLD CALC: 22.6 % — LOW (ref 34.5–45)
HCT VFR BLD CALC: 23.3 % — LOW (ref 34.5–45)
HGB BLD-MCNC: 7.5 G/DL — LOW (ref 11.5–15.5)
HGB BLD-MCNC: 7.7 G/DL — LOW (ref 11.5–15.5)
IMM GRANULOCYTES NFR BLD AUTO: 1.2 % — SIGNIFICANT CHANGE UP (ref 0–1.5)
IMM GRANULOCYTES NFR BLD AUTO: 1.3 % — SIGNIFICANT CHANGE UP (ref 0–1.5)
LYMPHOCYTES # BLD AUTO: 0.65 K/UL — LOW (ref 1–3.3)
LYMPHOCYTES # BLD AUTO: 0.66 K/UL — LOW (ref 1–3.3)
LYMPHOCYTES # BLD AUTO: 14.6 % — SIGNIFICANT CHANGE UP (ref 13–44)
LYMPHOCYTES # BLD AUTO: 16.5 % — SIGNIFICANT CHANGE UP (ref 13–44)
MAGNESIUM SERPL-MCNC: 1.4 MG/DL — LOW (ref 1.6–2.6)
MCHC RBC-ENTMCNC: 29.5 PG — SIGNIFICANT CHANGE UP (ref 27–34)
MCHC RBC-ENTMCNC: 29.8 PG — SIGNIFICANT CHANGE UP (ref 27–34)
MCHC RBC-ENTMCNC: 33 % — SIGNIFICANT CHANGE UP (ref 32–36)
MCHC RBC-ENTMCNC: 33.2 % — SIGNIFICANT CHANGE UP (ref 32–36)
MCV RBC AUTO: 89.3 FL — SIGNIFICANT CHANGE UP (ref 80–100)
MCV RBC AUTO: 89.7 FL — SIGNIFICANT CHANGE UP (ref 80–100)
MONOCYTES # BLD AUTO: 0.45 K/UL — SIGNIFICANT CHANGE UP (ref 0–0.9)
MONOCYTES # BLD AUTO: 0.5 K/UL — SIGNIFICANT CHANGE UP (ref 0–0.9)
MONOCYTES NFR BLD AUTO: 11.2 % — SIGNIFICANT CHANGE UP (ref 2–14)
MONOCYTES NFR BLD AUTO: 11.2 % — SIGNIFICANT CHANGE UP (ref 2–14)
NEUTROPHILS # BLD AUTO: 2.77 K/UL — SIGNIFICANT CHANGE UP (ref 1.8–7.4)
NEUTROPHILS # BLD AUTO: 3.17 K/UL — SIGNIFICANT CHANGE UP (ref 1.8–7.4)
NEUTROPHILS NFR BLD AUTO: 69.2 % — SIGNIFICANT CHANGE UP (ref 43–77)
NEUTROPHILS NFR BLD AUTO: 71.4 % — SIGNIFICANT CHANGE UP (ref 43–77)
NRBC # FLD: 0 K/UL — SIGNIFICANT CHANGE UP (ref 0–0)
NRBC # FLD: 0 K/UL — SIGNIFICANT CHANGE UP (ref 0–0)
PHOSPHATE SERPL-MCNC: 2.5 MG/DL — SIGNIFICANT CHANGE UP (ref 2.5–4.5)
PLATELET # BLD AUTO: 110 K/UL — LOW (ref 150–400)
PLATELET # BLD AUTO: 114 K/UL — LOW (ref 150–400)
PMV BLD: 9.2 FL — SIGNIFICANT CHANGE UP (ref 7–13)
PMV BLD: 9.5 FL — SIGNIFICANT CHANGE UP (ref 7–13)
POTASSIUM SERPL-MCNC: 3.6 MMOL/L — SIGNIFICANT CHANGE UP (ref 3.5–5.3)
POTASSIUM SERPL-SCNC: 3.6 MMOL/L — SIGNIFICANT CHANGE UP (ref 3.5–5.3)
RBC # BLD: 2.52 M/UL — LOW (ref 3.8–5.2)
RBC # BLD: 2.61 M/UL — LOW (ref 3.8–5.2)
RBC # FLD: 16 % — HIGH (ref 10.3–14.5)
RBC # FLD: 16 % — HIGH (ref 10.3–14.5)
RH IG SCN BLD-IMP: POSITIVE — SIGNIFICANT CHANGE UP
SODIUM SERPL-SCNC: 137 MMOL/L — SIGNIFICANT CHANGE UP (ref 135–145)
WBC # BLD: 4.01 K/UL — SIGNIFICANT CHANGE UP (ref 3.8–10.5)
WBC # BLD: 4.45 K/UL — SIGNIFICANT CHANGE UP (ref 3.8–10.5)
WBC # FLD AUTO: 4.01 K/UL — SIGNIFICANT CHANGE UP (ref 3.8–10.5)
WBC # FLD AUTO: 4.45 K/UL — SIGNIFICANT CHANGE UP (ref 3.8–10.5)

## 2020-02-10 PROCEDURE — 99232 SBSQ HOSP IP/OBS MODERATE 35: CPT

## 2020-02-10 PROCEDURE — 99497 ADVNCD CARE PLAN 30 MIN: CPT | Mod: 25

## 2020-02-10 PROCEDURE — 36247 INS CATH ABD/L-EXT ART 3RD: CPT

## 2020-02-10 PROCEDURE — 76937 US GUIDE VASCULAR ACCESS: CPT | Mod: 26

## 2020-02-10 PROCEDURE — 99233 SBSQ HOSP IP/OBS HIGH 50: CPT

## 2020-02-10 PROCEDURE — 37244 VASC EMBOLIZE/OCCLUDE BLEED: CPT

## 2020-02-10 RX ORDER — HYDROMORPHONE HYDROCHLORIDE 2 MG/ML
0.5 INJECTION INTRAMUSCULAR; INTRAVENOUS; SUBCUTANEOUS
Refills: 0 | Status: DISCONTINUED | OUTPATIENT
Start: 2020-02-10 | End: 2020-02-10

## 2020-02-10 RX ORDER — HYDROMORPHONE HYDROCHLORIDE 2 MG/ML
0.5 INJECTION INTRAMUSCULAR; INTRAVENOUS; SUBCUTANEOUS
Refills: 0 | Status: DISCONTINUED | OUTPATIENT
Start: 2020-02-10 | End: 2020-02-11

## 2020-02-10 RX ORDER — ONDANSETRON 8 MG/1
4 TABLET, FILM COATED ORAL ONCE
Refills: 0 | Status: DISCONTINUED | OUTPATIENT
Start: 2020-02-10 | End: 2020-02-10

## 2020-02-10 RX ORDER — NALOXONE HYDROCHLORIDE 4 MG/.1ML
0.1 SPRAY NASAL
Refills: 0 | Status: DISCONTINUED | OUTPATIENT
Start: 2020-02-10 | End: 2020-02-11

## 2020-02-10 RX ORDER — KETOROLAC TROMETHAMINE 30 MG/ML
30 SYRINGE (ML) INJECTION EVERY 6 HOURS
Refills: 0 | Status: DISCONTINUED | OUTPATIENT
Start: 2020-02-10 | End: 2020-02-11

## 2020-02-10 RX ORDER — HYDROMORPHONE HYDROCHLORIDE 2 MG/ML
30 INJECTION INTRAMUSCULAR; INTRAVENOUS; SUBCUTANEOUS
Refills: 0 | Status: DISCONTINUED | OUTPATIENT
Start: 2020-02-10 | End: 2020-02-11

## 2020-02-10 RX ORDER — ACETAMINOPHEN 500 MG
1000 TABLET ORAL ONCE
Refills: 0 | Status: DISCONTINUED | OUTPATIENT
Start: 2020-02-10 | End: 2020-02-20

## 2020-02-10 RX ORDER — DEXAMETHASONE 0.5 MG/5ML
8 ELIXIR ORAL ONCE
Refills: 0 | Status: DISCONTINUED | OUTPATIENT
Start: 2020-02-10 | End: 2020-02-10

## 2020-02-10 RX ORDER — HYDROMORPHONE HYDROCHLORIDE 2 MG/ML
0.25 INJECTION INTRAMUSCULAR; INTRAVENOUS; SUBCUTANEOUS
Refills: 0 | Status: DISCONTINUED | OUTPATIENT
Start: 2020-02-10 | End: 2020-02-10

## 2020-02-10 RX ORDER — SODIUM CHLORIDE 9 MG/ML
1000 INJECTION INTRAMUSCULAR; INTRAVENOUS; SUBCUTANEOUS
Refills: 0 | Status: DISCONTINUED | OUTPATIENT
Start: 2020-02-10 | End: 2020-02-16

## 2020-02-10 RX ADMIN — SODIUM CHLORIDE 80 MILLILITER(S): 9 INJECTION INTRAMUSCULAR; INTRAVENOUS; SUBCUTANEOUS at 17:02

## 2020-02-10 RX ADMIN — HYDROMORPHONE HYDROCHLORIDE 0.5 MILLIGRAM(S): 2 INJECTION INTRAMUSCULAR; INTRAVENOUS; SUBCUTANEOUS at 16:45

## 2020-02-10 RX ADMIN — Medication 30 MILLIGRAM(S): at 23:41

## 2020-02-10 RX ADMIN — Medication 30 MILLIGRAM(S): at 17:48

## 2020-02-10 RX ADMIN — Medication 30 MILLIGRAM(S): at 23:56

## 2020-02-10 RX ADMIN — HYDROMORPHONE HYDROCHLORIDE 30 MILLILITER(S): 2 INJECTION INTRAMUSCULAR; INTRAVENOUS; SUBCUTANEOUS at 18:00

## 2020-02-10 RX ADMIN — HYDROMORPHONE HYDROCHLORIDE 30 MILLILITER(S): 2 INJECTION INTRAMUSCULAR; INTRAVENOUS; SUBCUTANEOUS at 19:18

## 2020-02-10 RX ADMIN — HYDROMORPHONE HYDROCHLORIDE 30 MILLILITER(S): 2 INJECTION INTRAMUSCULAR; INTRAVENOUS; SUBCUTANEOUS at 17:00

## 2020-02-10 RX ADMIN — CHLORHEXIDINE GLUCONATE 1 APPLICATION(S): 213 SOLUTION TOPICAL at 12:38

## 2020-02-10 RX ADMIN — HYDROMORPHONE HYDROCHLORIDE 0.5 MILLIGRAM(S): 2 INJECTION INTRAMUSCULAR; INTRAVENOUS; SUBCUTANEOUS at 16:30

## 2020-02-10 NOTE — PROGRESS NOTE ADULT - SUBJECTIVE AND OBJECTIVE BOX
Patient is a 69y old  Female who presents with a chief complaint of Sent from Onc office for hypotension (10 Feb 2020 10:53)      SUBJECTIVE / OVERNIGHT EVENTS:      MEDICATIONS  (STANDING):  chlorhexidine 4% Liquid 1 Application(s) Topical daily  gabapentin 300 milliGRAM(s) Oral two times a day  lactulose Syrup 10 Gram(s) Oral every 6 hours  polyethylene glycol 3350 17 Gram(s) Oral daily  sodium chloride 0.9%. 1000 milliLiter(s) (80 mL/Hr) IV Continuous <Continuous>    MEDICATIONS  (PRN):  acetaminophen   Tablet .. 650 milliGRAM(s) Oral every 6 hours PRN Temp greater or equal to 38C (100.4F), Mild Pain (1 - 3), Moderate Pain (4 - 6)  bisacodyl 5 milliGRAM(s) Oral every 12 hours PRN Constipation  ondansetron Injectable 4 milliGRAM(s) IV Push every 8 hours PRN Nausea and/or Vomiting  oxyCODONE    IR 5 milliGRAM(s) Oral every 6 hours PRN moderate pain 4-6 and severe pain 7-10        CAPILLARY BLOOD GLUCOSE        I&O's Summary      PHYSICAL EXAM:  GENERAL: NAD, well-developed  HEAD:  Atraumatic, Normocephalic  EYES: EOMI, PERRLA, conjunctiva and sclera clear  NECK: Supple, No JVD  CHEST/LUNG: Clear to auscultation bilaterally; No wheeze  HEART: Regular rate and rhythm; No murmurs, rubs, or gallops  ABDOMEN: Soft, Nontender, Nondistended; Bowel sounds present  EXTREMITIES:  2+ Peripheral Pulses, No clubbing, cyanosis, or edema  PSYCH: AAOx3  NEUROLOGY: non-focal  SKIN: No rashes or lesions    LABS:                        7.7    4.45  )-----------( 114      ( 10 Feb 2020 07:00 )             23.3     02-10    137  |  104  |  13  ----------------------------<  87  3.6   |  23  |  1.46<H>    Ca    8.7      10 Feb 2020 07:00  Phos  2.5     02-10  Mg     1.4     02-10    TPro  x   /  Alb  2.6<L>  /  TBili  x   /  DBili  x   /  AST  x   /  ALT  x   /  AlkPhos  x   02-09              RADIOLOGY & ADDITIONAL TESTS:    Imaging Personally Reviewed:    Consultant(s) Notes Reviewed:      Care Discussed with Consultants/Other Providers:

## 2020-02-10 NOTE — PROGRESS NOTE ADULT - SUBJECTIVE AND OBJECTIVE BOX
Vascular & Interventional Radiology Pre-Procedure Note    Procedure Name: Uterine artery embolization.    HPI: 69y Female with endometrial cancer with recurrent hemorrhage. Uterine artery embolization is requested in an attempt to minimize bleeding.     Allergies:   Medications (Abx/Cardiac/Anticoagulation/Blood Products)  alteplase for catheter clearance: 2 milliGRAM(s) Catheter (02-08 @ 20:09)  alteplase for catheter clearance: 2 milliGRAM(s) Catheter (02-08 @ 22:52)    Data:    T(C): 36.7  HR: 97  BP: 136/79  RR: 16  SpO2: 100%    Exam  General: Resting comfortably in bed. NAD.      -WBC 4.45 / HgB 7.7 / Hct 23.3 / Plt 114  -Na 137 / Cl 104 / BUN 13 / Glucose 87  -K 3.6 / CO2 23 / Cr 1.46  -ALT -- / Alk Phos -- / T.Bili --  -INR1.20    Imaging: Enlarged uterus with endometrial mass.    Plan:   -69y Female presents for uterine artery embolization. I discussed with patient extensively the risks of the procedure including uterine necrosis, damage to vessels, uterine infection, and contrast induced nephropathy and she was agreeable to proceed. I also discussed these risks with her son who was agreeable to proceed as well.   -Risks/Benefits/alternatives explained with the patient and/or healthcare proxy and witnessed informed consent obtained.

## 2020-02-10 NOTE — PROGRESS NOTE ADULT - SUBJECTIVE AND OBJECTIVE BOX
Vascular & Interventional Radiology Post-Procedure Note    Pre-Procedure Diagnosis: Metastatic Uterine Cancer  Post-Procedure Diagnosis: Same as pre.  Indications for Procedure: Vaginal Bleeding    : Monique PAINTER, Fadumo PAINTER    Procedure Details/Findings: Access via R CFA. S/P Left Uterine Artery Embolization. The right uterine artery was diminutive in caliber and it was decided to not embolize the artery. Closure via manual compression.     Complications: none  Estimated Blood Loss: Minimal  Specimen: N/A  Contrast: See Report  Sedation: MAC  Patient Condition/Disposition: Stable. IRS/PACU then home

## 2020-02-10 NOTE — PROGRESS NOTE ADULT - SUBJECTIVE AND OBJECTIVE BOX
SUBJECTIVE AND OBJECTIVE:  Weakness.  No acute events.    INTERVAL HPI/OVERNIGHT EVENTS:  Awaiting IR embolization today.   DNR on chart:   Allergies    No Known Allergies    Intolerances    MEDICATIONS  (STANDING):  chlorhexidine 4% Liquid 1 Application(s) Topical daily  gabapentin 300 milliGRAM(s) Oral two times a day  lactulose Syrup 10 Gram(s) Oral every 6 hours  polyethylene glycol 3350 17 Gram(s) Oral daily  sodium chloride 0.9%. 1000 milliLiter(s) (100 mL/Hr) IV Continuous <Continuous>    MEDICATIONS  (PRN):  bisacodyl 5 milliGRAM(s) Oral every 12 hours PRN Constipation  ondansetron Injectable 4 milliGRAM(s) IV Push every 8 hours PRN Nausea and/or Vomiting      ITEMS UNCHECKED ARE NOT PRESENT    PRESENT SYMPTOMS: [ ]Unable to obtain due to poor mentation   Source if other than patient:  [ ]Family   [ ]Team     Pain (Impact on QOL):  None  Location:  Minimal acceptable level (0-10 scale):                   Aggravating factors:  Quality:  Radiation:  Severity (0-10 scale):    Timing:    Dyspnea:                           [ x]Mild [ ]Moderate [ ]Severe  Anxiety:                             [ x]Mild [ ]Moderate [ ]Severe  Fatigue:                             [ ]Mild [ x]Moderate [ ]Severe  Nausea:                             [ ]Mild [ ]Moderate [ ]Severe  Loss of appetite:              [x ]Mild [ ]Moderate [ ]Severe  Constipation:                    [ ]Mild [ ]Moderate [ ]Severe  Grief Present                    [ ] Yes  [x ] No   PAIN AD Score:	  [email or web address]/[email or web address] (Ctrl + left click to view)    Other Symptoms:  [ ]All other review of systems negative     Karnofsky Performance Score/Palliative Performance Status Version 2:    50     %    http://palliative.info/resource_material/[email or web address].pdf  PHYSICAL EXAM:  Vital Signs Last 24 Hrs  T(C): 37.1 (03 Feb 2020 05:43), Max: 37.2 (02 Feb 2020 13:09)  T(F): 98.7 (03 Feb 2020 05:43), Max: 98.9 (02 Feb 2020 13:09)  HR: 86 (03 Feb 2020 10:10) (82 - 95)  BP: 110/68 (03 Feb 2020 10:10) (101/62 - 125/60)  BP(mean): --  RR: 18 (03 Feb 2020 10:10) (18 - 18)  SpO2: 98% (03 Feb 2020 10:10) (98% - 100%) I&O's Summary   GENERAL:  [x ]Alert  [x ]Oriented x  3 [ ]Lethargic  [x ]Cachexia  [ ]Unarousable  [ ]Verbal  [ ]Non-Verbal  Behavioral:   [ ] Anxiety  [ ] Delirium [ ] Agitation [x ] Other  HEENT:  [ ]Normal   [x ]Dry mouth   [ ]ET Tube/Trach  [ ]Oral lesions  PULMONARY:   [x]Clear [ ]Tachypnea  [ ]Audible excessive secretions   [ ]Rhonchi        [ ]Right [ ]Left [ ]Bilateral  [ ]Crackles        [ ]Right [ ]Left [ ]Bilateral  [ ]Wheezing     [ ]Right [ ]Left [ ]Bilateral  CARDIOVASCULAR:    [x ]Regular [ ]Irregular [ ]Tachy  [ ]Teja [ ]Murmur [ ]Other  GASTROINTESTINAL:  [x ]Soft  [ ]Distended   [x ]+BS  [ x]Non tender [ ]Tender  [ ]PEG [ ]OGT/ NGT   Last BM:    GENITOURINARY:  [ x]Normal [ ] Incontinent   [ ]Oliguria/Anuria   [ ]Sheldon  MUSCULOSKELETAL:   [ ]Normal   [x ]Weakness  [ ]Bed/Wheelchair bound [ ]Edema  NEUROLOGIC:   [ x]No focal deficits  [ ] Cognitive impairment  [ ] Dysphagia [ ]Dysarthria [ ] Paresis [ ]Other   SKIN:   [x ]Normal   [ ]Pressure ulcer(s)  [ ]Rash    CRITICAL CARE:  [ ] Shock Present  [ ]Septic [ ]Cardiogenic [ ]Neurologic [ ]Hypovolemic  [ ]  Vasopressors [ ]  Inotropes   [ ] Respiratory failure present  [ ] Acute  [ ] Chronic [ ] Hypoxic  [ ] Hypercarbic [ ] Other  [ ] Other organ failure     LABS:                        7.1    6.21  )-----------( 195      ( 03 Feb 2020 06:40 )             21.8   02-03    133<L>  |  98  |  18  ----------------------------<  95  4.0   |  20<L>  |  1.31<H>    Ca    9.0      03 Feb 2020 06:40  Phos  3.4     02-03  Mg     1.7     02-03          RADIOLOGY & ADDITIONAL STUDIES:    Protein Calorie Malnutrition Present: [ ] yes [ ] no  [ ] PPSV2 < or = 30%  [ ] significant weight loss [ ] poor nutritional intake [ ] anasarca [ ] catabolic state Albumin, Serum: 2.6 g/dL (01-29-20 @ 06:30)  Artificial Nutrition [ ]     REFERRALS:   [ ]Chaplaincy  [ ] Hospice  [ ]Child Life  [ ]Social Work  [ ]Case management [ ]Holistic Therapy   Goals of Care Document:

## 2020-02-10 NOTE — PROGRESS NOTE ADULT - ASSESSMENT
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 Anemia due to blood loss. -likely 2/2 vaginal bleed in the setting of uterine cancer.     Seen by Palliative  Patient WANTS CANCER TREATMENT   Ir will do ablation of bleed today

## 2020-02-11 DIAGNOSIS — Z71.89 OTHER SPECIFIED COUNSELING: ICD-10-CM

## 2020-02-11 LAB
ANION GAP SERPL CALC-SCNC: 13 MMO/L — SIGNIFICANT CHANGE UP (ref 7–14)
BASOPHILS # BLD AUTO: 0.01 K/UL — SIGNIFICANT CHANGE UP (ref 0–0.2)
BASOPHILS # BLD AUTO: 0.01 K/UL — SIGNIFICANT CHANGE UP (ref 0–0.2)
BASOPHILS NFR BLD AUTO: 0.2 % — SIGNIFICANT CHANGE UP (ref 0–2)
BASOPHILS NFR BLD AUTO: 0.3 % — SIGNIFICANT CHANGE UP (ref 0–2)
BUN SERPL-MCNC: 16 MG/DL — SIGNIFICANT CHANGE UP (ref 7–23)
CALCIUM SERPL-MCNC: 8.8 MG/DL — SIGNIFICANT CHANGE UP (ref 8.4–10.5)
CHLORIDE SERPL-SCNC: 104 MMOL/L — SIGNIFICANT CHANGE UP (ref 98–107)
CO2 SERPL-SCNC: 20 MMOL/L — LOW (ref 22–31)
CREAT SERPL-MCNC: 1.52 MG/DL — HIGH (ref 0.5–1.3)
EOSINOPHIL # BLD AUTO: 0.06 K/UL — SIGNIFICANT CHANGE UP (ref 0–0.5)
EOSINOPHIL # BLD AUTO: 0.06 K/UL — SIGNIFICANT CHANGE UP (ref 0–0.5)
EOSINOPHIL NFR BLD AUTO: 1.4 % — SIGNIFICANT CHANGE UP (ref 0–6)
EOSINOPHIL NFR BLD AUTO: 1.9 % — SIGNIFICANT CHANGE UP (ref 0–6)
GLUCOSE SERPL-MCNC: 111 MG/DL — HIGH (ref 70–99)
HCT VFR BLD CALC: 22.8 % — LOW (ref 34.5–45)
HCT VFR BLD CALC: 24.2 % — LOW (ref 34.5–45)
HGB BLD-MCNC: 7.3 G/DL — LOW (ref 11.5–15.5)
HGB BLD-MCNC: 8 G/DL — LOW (ref 11.5–15.5)
IMM GRANULOCYTES NFR BLD AUTO: 1.2 % — SIGNIFICANT CHANGE UP (ref 0–1.5)
IMM GRANULOCYTES NFR BLD AUTO: 1.3 % — SIGNIFICANT CHANGE UP (ref 0–1.5)
LYMPHOCYTES # BLD AUTO: 0.52 K/UL — LOW (ref 1–3.3)
LYMPHOCYTES # BLD AUTO: 0.56 K/UL — LOW (ref 1–3.3)
LYMPHOCYTES # BLD AUTO: 12.9 % — LOW (ref 13–44)
LYMPHOCYTES # BLD AUTO: 16.8 % — SIGNIFICANT CHANGE UP (ref 13–44)
MAGNESIUM SERPL-MCNC: 1.7 MG/DL — SIGNIFICANT CHANGE UP (ref 1.6–2.6)
MCHC RBC-ENTMCNC: 29 PG — SIGNIFICANT CHANGE UP (ref 27–34)
MCHC RBC-ENTMCNC: 29.9 PG — SIGNIFICANT CHANGE UP (ref 27–34)
MCHC RBC-ENTMCNC: 32 % — SIGNIFICANT CHANGE UP (ref 32–36)
MCHC RBC-ENTMCNC: 33.1 % — SIGNIFICANT CHANGE UP (ref 32–36)
MCV RBC AUTO: 90.3 FL — SIGNIFICANT CHANGE UP (ref 80–100)
MCV RBC AUTO: 90.5 FL — SIGNIFICANT CHANGE UP (ref 80–100)
MONOCYTES # BLD AUTO: 0.3 K/UL — SIGNIFICANT CHANGE UP (ref 0–0.9)
MONOCYTES # BLD AUTO: 0.4 K/UL — SIGNIFICANT CHANGE UP (ref 0–0.9)
MONOCYTES NFR BLD AUTO: 9.2 % — SIGNIFICANT CHANGE UP (ref 2–14)
MONOCYTES NFR BLD AUTO: 9.7 % — SIGNIFICANT CHANGE UP (ref 2–14)
NEUTROPHILS # BLD AUTO: 2.16 K/UL — SIGNIFICANT CHANGE UP (ref 1.8–7.4)
NEUTROPHILS # BLD AUTO: 3.26 K/UL — SIGNIFICANT CHANGE UP (ref 1.8–7.4)
NEUTROPHILS NFR BLD AUTO: 70 % — SIGNIFICANT CHANGE UP (ref 43–77)
NEUTROPHILS NFR BLD AUTO: 75.1 % — SIGNIFICANT CHANGE UP (ref 43–77)
NRBC # FLD: 0 K/UL — SIGNIFICANT CHANGE UP (ref 0–0)
NRBC # FLD: 0 K/UL — SIGNIFICANT CHANGE UP (ref 0–0)
PHOSPHATE SERPL-MCNC: 4.4 MG/DL — SIGNIFICANT CHANGE UP (ref 2.5–4.5)
PLATELET # BLD AUTO: 115 K/UL — LOW (ref 150–400)
PLATELET # BLD AUTO: 120 K/UL — LOW (ref 150–400)
PMV BLD: 9.3 FL — SIGNIFICANT CHANGE UP (ref 7–13)
PMV BLD: 9.3 FL — SIGNIFICANT CHANGE UP (ref 7–13)
POTASSIUM SERPL-MCNC: 3.7 MMOL/L — SIGNIFICANT CHANGE UP (ref 3.5–5.3)
POTASSIUM SERPL-SCNC: 3.7 MMOL/L — SIGNIFICANT CHANGE UP (ref 3.5–5.3)
RBC # BLD: 2.52 M/UL — LOW (ref 3.8–5.2)
RBC # BLD: 2.68 M/UL — LOW (ref 3.8–5.2)
RBC # FLD: 16 % — HIGH (ref 10.3–14.5)
RBC # FLD: 16.2 % — HIGH (ref 10.3–14.5)
SODIUM SERPL-SCNC: 137 MMOL/L — SIGNIFICANT CHANGE UP (ref 135–145)
WBC # BLD: 3.09 K/UL — LOW (ref 3.8–10.5)
WBC # BLD: 4.34 K/UL — SIGNIFICANT CHANGE UP (ref 3.8–10.5)
WBC # FLD AUTO: 3.09 K/UL — LOW (ref 3.8–10.5)
WBC # FLD AUTO: 4.34 K/UL — SIGNIFICANT CHANGE UP (ref 3.8–10.5)

## 2020-02-11 PROCEDURE — 99232 SBSQ HOSP IP/OBS MODERATE 35: CPT

## 2020-02-11 PROCEDURE — 99223 1ST HOSP IP/OBS HIGH 75: CPT | Mod: GC

## 2020-02-11 RX ORDER — OXYCODONE HYDROCHLORIDE 5 MG/1
5 TABLET ORAL
Refills: 0 | Status: DISCONTINUED | OUTPATIENT
Start: 2020-02-11 | End: 2020-02-13

## 2020-02-11 RX ADMIN — Medication 30 MILLIGRAM(S): at 05:12

## 2020-02-11 RX ADMIN — OXYCODONE HYDROCHLORIDE 5 MILLIGRAM(S): 5 TABLET ORAL at 17:40

## 2020-02-11 RX ADMIN — OXYCODONE HYDROCHLORIDE 5 MILLIGRAM(S): 5 TABLET ORAL at 23:04

## 2020-02-11 RX ADMIN — Medication 30 MILLIGRAM(S): at 05:28

## 2020-02-11 RX ADMIN — Medication 30 MILLIGRAM(S): at 13:52

## 2020-02-11 RX ADMIN — Medication 30 MILLIGRAM(S): at 13:04

## 2020-02-11 RX ADMIN — SODIUM CHLORIDE 80 MILLILITER(S): 9 INJECTION INTRAMUSCULAR; INTRAVENOUS; SUBCUTANEOUS at 14:44

## 2020-02-11 RX ADMIN — CHLORHEXIDINE GLUCONATE 1 APPLICATION(S): 213 SOLUTION TOPICAL at 13:05

## 2020-02-11 RX ADMIN — OXYCODONE HYDROCHLORIDE 5 MILLIGRAM(S): 5 TABLET ORAL at 16:48

## 2020-02-11 RX ADMIN — HYDROMORPHONE HYDROCHLORIDE 30 MILLILITER(S): 2 INJECTION INTRAMUSCULAR; INTRAVENOUS; SUBCUTANEOUS at 07:56

## 2020-02-11 NOTE — CONSULT NOTE ADULT - REASON FOR ADMISSION
Sent from Onc office for hypotension

## 2020-02-11 NOTE — PROGRESS NOTE ADULT - SUBJECTIVE AND OBJECTIVE BOX
Anesthesia Pain Management Service    SUBJECTIVE: Pt now off IV PCA without problems reported.    Therapy:	  [ X] IV PCA	   [ ] Epidural           [ ] s/p Spinal Opoid              [ ] Postpartum infusion	  [ ] Patient controlled regional anesthesia (PCRA)    [ ] prn Analgesics    Allergies    No Known Allergies    Intolerances      MEDICATIONS  (STANDING):  chlorhexidine 4% Liquid 1 Application(s) Topical daily  gabapentin 300 milliGRAM(s) Oral two times a day  ketorolac   Injectable 30 milliGRAM(s) IV Push every 6 hours  lactulose Syrup 10 Gram(s) Oral every 6 hours  polyethylene glycol 3350 17 Gram(s) Oral daily  sodium chloride 0.9%. 1000 milliLiter(s) (80 mL/Hr) IV Continuous <Continuous>    MEDICATIONS  (PRN):  acetaminophen   Tablet .. 650 milliGRAM(s) Oral every 6 hours PRN Temp greater or equal to 38C (100.4F), Mild Pain (1 - 3), Moderate Pain (4 - 6)  acetaminophen  IVPB .. 1000 milliGRAM(s) IV Intermittent once PRN Mild Pain (1 - 3)  bisacodyl 5 milliGRAM(s) Oral every 12 hours PRN Constipation  ondansetron Injectable 4 milliGRAM(s) IV Push every 8 hours PRN Nausea and/or Vomiting  oxyCODONE    IR 5 milliGRAM(s) Oral every 3 hours PRN Severe Pain (7 - 10)      OBJECTIVE:   [X] No new signs     [ ] Other:    Side Effects:  [X ] None			[ ] Other:    Assessment of Catheter Site:		[ ] Intact		[ ] Other:    ASSESSMENT/PLAN  [ ] Continue current therapy    [X ] Therapy changed to:    [ ] IV PCA       [ ] Epidural     [ X] prn Analgesics     Comments: PRN Oral/IV opioids and/or non-opioid adjuvant analgesics to be used at this point.    Progress Note written now but Patient was seen earlier.

## 2020-02-11 NOTE — PROGRESS NOTE ADULT - SUBJECTIVE AND OBJECTIVE BOX
Patient is a 69y old  Female who presents with a chief complaint of Sent from Onc office for hypotension (11 Feb 2020 14:17)      SUBJECTIVE / OVERNIGHT EVENTS:  patient had Ir ablation of vessel to pelvic area 2/10  will monitor cbc  Renal called for consult    MEDICATIONS  (STANDING):  chlorhexidine 4% Liquid 1 Application(s) Topical daily  gabapentin 300 milliGRAM(s) Oral two times a day  ketorolac   Injectable 30 milliGRAM(s) IV Push every 6 hours  lactulose Syrup 10 Gram(s) Oral every 6 hours  polyethylene glycol 3350 17 Gram(s) Oral daily  sodium chloride 0.9%. 1000 milliLiter(s) (80 mL/Hr) IV Continuous <Continuous>    MEDICATIONS  (PRN):  acetaminophen   Tablet .. 650 milliGRAM(s) Oral every 6 hours PRN Temp greater or equal to 38C (100.4F), Mild Pain (1 - 3), Moderate Pain (4 - 6)  acetaminophen  IVPB .. 1000 milliGRAM(s) IV Intermittent once PRN Mild Pain (1 - 3)  bisacodyl 5 milliGRAM(s) Oral every 12 hours PRN Constipation  ondansetron Injectable 4 milliGRAM(s) IV Push every 8 hours PRN Nausea and/or Vomiting  oxyCODONE    IR 5 milliGRAM(s) Oral every 3 hours PRN Severe Pain (7 - 10)    Vital Signs Last 24 Hrs  T(C): 36.5 (11 Feb 2020 13:00), Max: 36.8 (11 Feb 2020 02:00)  T(F): 97.7 (11 Feb 2020 13:00), Max: 98.2 (11 Feb 2020 02:00)  HR: 81 (11 Feb 2020 13:00) (74 - 99)  BP: 107/67 (11 Feb 2020 13:00) (100/67 - 129/73)  BP(mean): 82 (10 Feb 2020 17:30) (79 - 87)  RR: 16 (11 Feb 2020 13:01) (12 - 21)  SpO2: 100% (11 Feb 2020 13:00) (98% - 100%)    PHYSICAL EXAM:  GENERAL: NAD, well-developed  HEAD:  Atraumatic, Normocephalic  EYES: EOMI, PERRLA, conjunctiva and sclera clear  NECK: Supple, No JVD  CHEST/LUNG: Clear to auscultation bilaterally; No wheeze  HEART: Regular rate and rhythm; No murmurs, rubs, or gallops  ABDOMEN: Soft, Nontender, Nondistended; Bowel sounds present  EXTREMITIES:  2+ Peripheral Pulses, No clubbing, cyanosis, or edema  PSYCH: AAOx3  NEUROLOGY: non-focal  SKIN: No rashes or lesions    LABS:                        7.3    3.09  )-----------( 115      ( 11 Feb 2020 06:00 )             22.8     02-11    137  |  104  |  16  ----------------------------<  111<H>  3.7   |  20<L>  |  1.52<H>    Ca    8.8      11 Feb 2020 06:00  Phos  4.4     02-11  Mg     1.7     02-11            Care Discussed with Consultants/Other Providers:  Renal consult called

## 2020-02-11 NOTE — CONSULT NOTE ADULT - PROBLEM SELECTOR RECOMMENDATION 9
STEVEN likely 2/2 hemodynamically meditated STEVEN in setting hypotension (90/60s on admission), acute anemia (hgb 5.9 in setting uterine carcinoma), IV contrast (2/4), toradol (2/10). On admission sCr 2.37, baseline range 1.4-1.6 in 2019, improved with transfusion/IVF to 1.2 on 2/5/2020 then uptrend back to 1.52 on 2/11/2020.  CT A/P show normal size kidneys.   Recommendations:  - Please discontinue Toradol and avoid any NSAIDS  - agree with IVF for now  - trend sCr, monitor intake/output  - avoid nephrotoxic agents (ACEI/ARB, NSAIDS)

## 2020-02-11 NOTE — CHART NOTE - NSCHARTNOTEFT_GEN_A_CORE
Source: Patient [X ]    Family [ ]     other [ X] electronic chart, RN     Diet : Diet, Regular:   No Fish (01-27-20 @ 23:44)    Patient seen for nutrition follow-up. Patient currently tolerating diet well.         Current Weight:       Pertinent Medications: gabapentin  ketorolac   Injectable  lactulose Syrup  polyethylene glycol 3350  sodium chloride 0.9%.    Pertinent Labs:  02-11 Na137 mmol/L Glu 111 mg/dL<H> K+ 3.7 mmol/L Cr  1.52 mg/dL<H> BUN 16 mg/dL 02-11 Phos 4.4 mg/dL 02-09 Alb 2.6 g/dL<L>      Skin:     Estimated Needs:   [ ] no change since previous assessment  [ ] recalculated:       Previous Nutrition Diagnosis:     [ ] Inadequate Energy Intake [ ]Inadequate Oral Intake [ ] Excessive Energy Intake     [ ] Underweight [ ] Increased Nutrient Needs [ ] Overweight/Obesity     [ ] Altered GI Function [ ] Unintended Weight Loss [ ] Food & Nutrition Related Knowledge Deficit [ ] Malnutrition      Nutrition Diagnosis is [ ] ongoing  [ ] resolved [ ] not applicable          Additional Recommendations: Source: Patient [X ]    Family [ ]     other [ X] electronic chart, RN     Diet : Diet, Regular:   No Fish (01-27-20 @ 23:44)    Patient seen for nutrition follow-up. Patient endorses good appetite, po intake >75% at this time. Family also brings food for patient. Patient denies any nausea/vomiting/diarrhea/constipation or difficulty chewing and swallowing. Encouraged continue good po intakes of nutrient and protein dense foods. No questions or concerns voiced at this time. RDN remains available, patient made aware.      Weight: 75.2kg (1/28)      Pertinent Medications: gabapentin  ketorolac   Injectable  lactulose Syrup  polyethylene glycol 3350  sodium chloride 0.9%.    Pertinent Labs:  02-11 Na137 mmol/L Glu 111 mg/dL<H> K+ 3.7 mmol/L Cr  1.52 mg/dL<H> BUN 16 mg/dL 02-11 Phos 4.4 mg/dL 02-09 Alb 2.6 g/dL<L>      Skin: Right chest wall, skin tear wound noted.   +1 b/l ankle, foot edema noted.     Estimated Needs:   [ X] no change since previous assessment  [ ] recalculated:         Additional Recommendations:  1. Continue current diet order, which remains appropriate at this time.   2. Monitor weights, labs, BM's, skin integrity, p.o. intake.   3. Please Encourage po intake, assist with meals and menu selections, provide alternatives PRN.

## 2020-02-11 NOTE — PROGRESS NOTE ADULT - ASSESSMENT
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 Anemia due to blood loss. -likely 2/2 vaginal bleed in the setting of uterine cancer.     Seen by Palliative  Patient WANTS CANCER TREATMENT   Ir  ablation of bleed 2/10  f/u cbc  Creat 1.5- renal called- c/w ivf

## 2020-02-11 NOTE — CONSULT NOTE ADULT - ASSESSMENT
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    Nephrology consulted for STEVEN.  On admission sCr 2.37, baseline range 1.4-1.6 in 2019, improved with transfusion/IVF to 1.2 on 2/5/2020 then uptrend back to 1.52 on 2/11/2020.  Risk factor IV contrast CT A/P (on 2/4/2020) and Toradol 30 q6hr(2/10/2020 -2/11)

## 2020-02-11 NOTE — PROGRESS NOTE ADULT - PROBLEM SELECTOR PLAN 8
DVT PPx  -SCDs for now in setting of active bleed
DVT PPx  -SCDs for now in setting of bleed
DVT PPx  -SCDs for now in setting of active bleed
DVT PPx  -SCDs for now in setting of bleed
DVT PPx  -SCDs for now in setting of active bleed
DVT PPx  -SCDs for now in setting of bleed

## 2020-02-11 NOTE — PROGRESS NOTE ADULT - SUBJECTIVE AND OBJECTIVE BOX
Anesthesia Pain Management Service    SUBJECTIVE: Patient is doing well with IV PCA and no significant problems reported.    Pain Scale Score	At rest: _3__ 	With Activity: ___ 	[X ] Refer to charted pain scores    THERAPY:    [ ] IV PCA Morphine		[ ] 5 mg/mL	[ ] 1 mg/mL  [X ] IV PCA Hydromorphone	[ ] 5 mg/mL	[X ] 1 mg/mL  [ ] IV PCA Fentanyl		[ ] 50 micrograms/mL    Demand dose __0.2_ lockout __6_ (minutes) Continuous Rate _0__ Total: _2.2__   mg used (in past 24 hrs)      MEDICATIONS  (STANDING):  chlorhexidine 4% Liquid 1 Application(s) Topical daily  gabapentin 300 milliGRAM(s) Oral two times a day  ketorolac   Injectable 30 milliGRAM(s) IV Push every 6 hours  lactulose Syrup 10 Gram(s) Oral every 6 hours  polyethylene glycol 3350 17 Gram(s) Oral daily  sodium chloride 0.9%. 1000 milliLiter(s) (80 mL/Hr) IV Continuous <Continuous>    MEDICATIONS  (PRN):  acetaminophen   Tablet .. 650 milliGRAM(s) Oral every 6 hours PRN Temp greater or equal to 38C (100.4F), Mild Pain (1 - 3), Moderate Pain (4 - 6)  acetaminophen  IVPB .. 1000 milliGRAM(s) IV Intermittent once PRN Mild Pain (1 - 3)  bisacodyl 5 milliGRAM(s) Oral every 12 hours PRN Constipation  ondansetron Injectable 4 milliGRAM(s) IV Push every 8 hours PRN Nausea and/or Vomiting  oxyCODONE    IR 5 milliGRAM(s) Oral every 3 hours PRN Severe Pain (7 - 10)      OBJECTIVE: laying in bed     Sedation Score:	[ X] Alert	[ ] Drowsy 	[ ] Arousable	[ ] Asleep	[ ] Unresponsive    Side Effects:	[X ] None	[ ] Nausea	[ ] Vomiting	[ ] Pruritus  		[ ] Other:    Vital Signs Last 24 Hrs  T(C): 36.3 (11 Feb 2020 06:27), Max: 36.8 (11 Feb 2020 02:00)  T(F): 97.3 (11 Feb 2020 06:27), Max: 98.2 (11 Feb 2020 02:00)  HR: 74 (11 Feb 2020 06:27) (74 - 99)  BP: 111/66 (11 Feb 2020 06:27) (105/73 - 136/79)  BP(mean): 82 (10 Feb 2020 17:30) (79 - 87)  RR: 18 (11 Feb 2020 06:27) (12 - 21)  SpO2: 100% (11 Feb 2020 06:27) (98% - 100%)    ASSESSMENT/ PLAN    Therapy to  be:	[ ] Continue   [ X] Discontinued   [X ] Change to prn Analgesics    Documentation and Verification of current medications:   [X] Done	[ ] Not done, not elligible    Comments: PRN Oral/IV opioids and/or Adjuvant medication to be ordered at this point.

## 2020-02-11 NOTE — CONSULT NOTE ADULT - SUBJECTIVE AND OBJECTIVE BOX
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  During hospital course required multiple blood transfusion (10U since admission) and on 2/10 underwent IR urterine artery embolization.      Nephrology consulted for STEVEN.  On admission sCr 2.37, baseline range 1.4-1.6 in 2019, improved with transfusion/IVF to 1.2 on 2/5/2020 then uptrend back to 1.52 on 2/11/2020.  During hospital course patient had IV contrast CT A/P (on 2/4/2020 showed no increase endometrial mass size, no active bleed and normal size kidneys). On Toradol 30q6hr started on 2/10/2020 and intermittent NS fluid (1/29, 2/4,6,7,10).  Patient denies any history kidney disease, no NSAIDs use outside hospital, denies history DM2 or family hx kidney ds.          PAST HISTORY  --------------------------------------------------------------------------------  PAST MEDICAL & SURGICAL HISTORY:  Malignant neoplasm of uterus, unspecified site  History of cervical biopsy    FAMILY HISTORY:  No pertinent family history in first degree relatives    PAST SOCIAL HISTORY:    ALLERGIES & MEDICATIONS  --------------------------------------------------------------------------------  Allergies    No Known Allergies    Intolerances      Standing Inpatient Medications  chlorhexidine 4% Liquid 1 Application(s) Topical daily  gabapentin 300 milliGRAM(s) Oral two times a day  ketorolac   Injectable 30 milliGRAM(s) IV Push every 6 hours  lactulose Syrup 10 Gram(s) Oral every 6 hours  polyethylene glycol 3350 17 Gram(s) Oral daily  sodium chloride 0.9%. 1000 milliLiter(s) IV Continuous <Continuous>    PRN Inpatient Medications  acetaminophen   Tablet .. 650 milliGRAM(s) Oral every 6 hours PRN  acetaminophen  IVPB .. 1000 milliGRAM(s) IV Intermittent once PRN  bisacodyl 5 milliGRAM(s) Oral every 12 hours PRN  ondansetron Injectable 4 milliGRAM(s) IV Push every 8 hours PRN  oxyCODONE    IR 5 milliGRAM(s) Oral every 3 hours PRN      REVIEW OF SYSTEMS  --------------------------------------------------------------------------------  Gen: No fatigue, fevers/chills, weakness  Respiratory: No dyspnea  CV: No chest pain  GI: No abdominal pain, diarrhea, constipation, nausea, vomiting  : No increased frequency, dysuria  MSK:  no edema  Neuro: No dizziness/lightheadedness, weakness      All other systems were reviewed and are negative, except as noted.    VITALS/PHYSICAL EXAM  --------------------------------------------------------------------------------  T(C): 36.5 (02-11-20 @ 13:00), Max: 36.8 (02-11-20 @ 02:00)  HR: 81 (02-11-20 @ 13:00) (74 - 99)  BP: 107/67 (02-11-20 @ 13:00) (100/67 - 129/73)  RR: 16 (02-11-20 @ 13:01) (12 - 21)  SpO2: 100% (02-11-20 @ 13:00) (98% - 100%)  Wt(kg): --        Physical Exam:  	Gen: NAD, well-appearing on room air  	HEENT: moist mucous membrane  	Pulm: CTA B/L  	CV: RRR, S1S2; no murmur/rub  	Abd: +BS, soft, nontender/nondistended  	LE: Warm, no edema, no asterixis  	Skin: Warm  	Vascular access: right chest chemoport    LABS/STUDIES  --------------------------------------------------------------------------------              7.3    3.09  >-----------<  115      [02-11-20 @ 06:00]              22.8     137  |  104  |  16  ----------------------------<  111      [02-11-20 @ 06:00]  3.7   |  20  |  1.52        Ca     8.8     [02-11-20 @ 06:00]      Mg     1.7     [02-11-20 @ 06:00]      Phos  4.4     [02-11-20 @ 06:00]            Creatinine Trend:  SCr 1.52 [02-11 @ 06:00]  SCr 1.46 [02-10 @ 07:00]  SCr 1.44 [02-09 @ 08:00]  SCr 1.50 [02-08 @ 05:58]  SCr 1.61 [02-07 @ 06:00]    Urinalysis - [01-29-20 @ 22:45]      Color LT. YELLOW / Appearance Lt TURBID / SG 1.017 / pH 6.0      Gluc NEGATIVE / Ketone NEGATIVE  / Bili NEGATIVE / Urobili NORMAL       Blood LARGE / Protein 100 / Leuk Est LARGE / Nitrite NEGATIVE      RBC >50 / WBC >50 / Hyaline NEGATIVE / Gran  / Sq Epi OCC / Non Sq Epi  / Bacteria FEW      Iron 18, TIBC 248, %sat --      [02-25-19 @ 02:58]  Ferritin 41.18      [02-25-19 @ 02:58]  TSH 1.56      [02-25-19 @ 02:58]    HCV 0.09, Nonreactive Hepatitis C AB  S/CO Ratio                        Interpretation  < 1.00                                   Non-Reactive  1.00 - 4.99                         Weakly-Reactive  > 5.00                                Reactive  Non-Reactive: A person with a non-reactive HCV antibody  result is considered uninfected.  No further action is  needed unless recent infection is suspected.  In these  cases, consider repeat testing later to detect  seroconversion..  Weakly-Reactive: HCV antibody test is abnormal, HCV RNA  Qualitative test will follow.  Reactive: HCV antibody test is abnormal, HCV RNA  Qualitative test will follow.  Note: HCV antibody testing is performed on the Abbott   system.      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  During hospital course required multiple blood transfusion (10U since admission) and on 2/10 underwent IR urterine artery embolization.      Nephrology consulted for STEVEN.  On admission sCr 2.37, baseline range 1.4-1.6 in 2019, improved with transfusion/IVF to 1.2 on 2/5/2020 then uptrend back to 1.52 on 2/11/2020.  During hospital course patient had IV contrast CT A/P (on 2/4/2020 showed no increase endometrial mass size, no active bleed and normal size kidneys). On Toradol 30q6hr started on 2/10/2020 and intermittent NS fluid (1/29, 2/4,6,7,10).  Patient denies any history kidney disease, no NSAIDs use outside hospital, denies history DM2 or family hx kidney ds.          PAST HISTORY  --------------------------------------------------------------------------------  PAST MEDICAL & SURGICAL HISTORY:  Malignant neoplasm of uterus, unspecified site  History of cervical biopsy    FAMILY HISTORY:  No pertinent family history in first degree relatives    PAST SOCIAL HISTORY: has one son.  retired    ALLERGIES & MEDICATIONS  --------------------------------------------------------------------------------  Allergies    No Known Allergies    Intolerances      Standing Inpatient Medications  chlorhexidine 4% Liquid 1 Application(s) Topical daily  gabapentin 300 milliGRAM(s) Oral two times a day  ketorolac   Injectable 30 milliGRAM(s) IV Push every 6 hours  lactulose Syrup 10 Gram(s) Oral every 6 hours  polyethylene glycol 3350 17 Gram(s) Oral daily  sodium chloride 0.9%. 1000 milliLiter(s) IV Continuous <Continuous>    PRN Inpatient Medications  acetaminophen   Tablet .. 650 milliGRAM(s) Oral every 6 hours PRN  acetaminophen  IVPB .. 1000 milliGRAM(s) IV Intermittent once PRN  bisacodyl 5 milliGRAM(s) Oral every 12 hours PRN  ondansetron Injectable 4 milliGRAM(s) IV Push every 8 hours PRN  oxyCODONE    IR 5 milliGRAM(s) Oral every 3 hours PRN      REVIEW OF SYSTEMS  --------------------------------------------------------------------------------  Gen: No fatigue, fevers/chills, weakness  Respiratory: No dyspnea  CV: No chest pain  GI: No abdominal pain, diarrhea, constipation, nausea, vomiting  : No increased frequency, dysuria  MSK:  no edema  Neuro: No dizziness/lightheadedness, weakness      All other systems were reviewed and are negative, except as noted.    VITALS/PHYSICAL EXAM  --------------------------------------------------------------------------------  T(C): 36.5 (02-11-20 @ 13:00), Max: 36.8 (02-11-20 @ 02:00)  HR: 81 (02-11-20 @ 13:00) (74 - 99)  BP: 107/67 (02-11-20 @ 13:00) (100/67 - 129/73)  RR: 16 (02-11-20 @ 13:01) (12 - 21)  SpO2: 100% (02-11-20 @ 13:00) (98% - 100%)  Wt(kg): --        Physical Exam:  	Gen: NAD, well-appearing on room air  	HEENT: moist mucous membrane  	Pulm: CTA B/L  	CV: RRR, S1S2; no murmur/rub  	Abd: +BS, soft, nontender/nondistended  	LE: Warm, no edema, no asterixis  	Skin: Warm  	Vascular access: right chest chemoport    LABS/STUDIES  --------------------------------------------------------------------------------              7.3    3.09  >-----------<  115      [02-11-20 @ 06:00]              22.8     137  |  104  |  16  ----------------------------<  111      [02-11-20 @ 06:00]  3.7   |  20  |  1.52        Ca     8.8     [02-11-20 @ 06:00]      Mg     1.7     [02-11-20 @ 06:00]      Phos  4.4     [02-11-20 @ 06:00]            Creatinine Trend:  SCr 1.52 [02-11 @ 06:00]  SCr 1.46 [02-10 @ 07:00]  SCr 1.44 [02-09 @ 08:00]  SCr 1.50 [02-08 @ 05:58]  SCr 1.61 [02-07 @ 06:00]    Urinalysis - [01-29-20 @ 22:45]      Color LT. YELLOW / Appearance Lt TURBID / SG 1.017 / pH 6.0      Gluc NEGATIVE / Ketone NEGATIVE  / Bili NEGATIVE / Urobili NORMAL       Blood LARGE / Protein 100 / Leuk Est LARGE / Nitrite NEGATIVE      RBC >50 / WBC >50 / Hyaline NEGATIVE / Gran  / Sq Epi OCC / Non Sq Epi  / Bacteria FEW      Iron 18, TIBC 248, %sat --      [02-25-19 @ 02:58]  Ferritin 41.18      [02-25-19 @ 02:58]  TSH 1.56      [02-25-19 @ 02:58]    HCV 0.09, Nonreactive Hepatitis C AB  S/CO Ratio                        Interpretation  < 1.00                                   Non-Reactive  1.00 - 4.99                         Weakly-Reactive  > 5.00                                Reactive  Non-Reactive: A person with a non-reactive HCV antibody  result is considered uninfected.  No further action is  needed unless recent infection is suspected.  In these  cases, consider repeat testing later to detect  seroconversion..  Weakly-Reactive: HCV antibody test is abnormal, HCV RNA  Qualitative test will follow.  Reactive: HCV antibody test is abnormal, HCV RNA  Qualitative test will follow.  Note: HCV antibody testing is performed on the Abbott   system.      [02-25-19 @ 05:00]

## 2020-02-11 NOTE — PROGRESS NOTE ADULT - SUBJECTIVE AND OBJECTIVE BOX
ANESTHESIA POSTOP CHECK    69y Female POSTOP DAY 1 S/P [place]    Vital Signs Last 24 Hrs  T(C): 36.5 (11 Feb 2020 13:00), Max: 36.8 (11 Feb 2020 02:00)  T(F): 97.7 (11 Feb 2020 13:00), Max: 98.2 (11 Feb 2020 02:00)  HR: 81 (11 Feb 2020 13:00) (74 - 99)  BP: 107/67 (11 Feb 2020 13:00) (100/67 - 129/73)  BP(mean): 82 (10 Feb 2020 17:30) (79 - 87)  RR: 16 (11 Feb 2020 13:01) (12 - 21)  SpO2: 100% (11 Feb 2020 13:00) (98% - 100%)  I&O's Summary      [x] NO APPARENT ANESTHESIA COMPLICATIONS      Spencer Barrientos MD [id number]  Pager [phone] / [phone]

## 2020-02-11 NOTE — PROGRESS NOTE ADULT - ATTENDING COMMENTS
creat 1.5  Had Ir ablation 2/10/2020 creat 1.5  f/u cbc today  Had Ir ablation 2/10/2020 creat 1.5  f/u cbc today  Had Ir ablation 2/10/2020  f/u Renal consult creat 1.5  f/u cbc today  Had Ir ablation 2/10/2020  f/u Renal consult  Called son Mr Stein Claudia creat 1.5  f/u cbc today  Had Ir ablation 2/10/2020  f/u Renal consult  Called son Mr Emil Taylor [phone]- son noted mother noted she had bleeding 2 / 3 years prior . Patient was in hospital Feb 2019 when she was Dx but he believe mother was bleeding for years but never took care of it. Explain patient will see Dr taylor out patient - patient offered hospice care but does not want at this time.

## 2020-02-12 ENCOUNTER — TRANSCRIPTION ENCOUNTER (OUTPATIENT)
Age: 70
End: 2020-02-12

## 2020-02-12 LAB
ANION GAP SERPL CALC-SCNC: 12 MMO/L — SIGNIFICANT CHANGE UP (ref 7–14)
BASOPHILS # BLD AUTO: 0.01 K/UL — SIGNIFICANT CHANGE UP (ref 0–0.2)
BASOPHILS NFR BLD AUTO: 0.2 % — SIGNIFICANT CHANGE UP (ref 0–2)
BUN SERPL-MCNC: 15 MG/DL — SIGNIFICANT CHANGE UP (ref 7–23)
CALCIUM SERPL-MCNC: 8.4 MG/DL — SIGNIFICANT CHANGE UP (ref 8.4–10.5)
CHLORIDE SERPL-SCNC: 109 MMOL/L — HIGH (ref 98–107)
CO2 SERPL-SCNC: 20 MMOL/L — LOW (ref 22–31)
CREAT SERPL-MCNC: 1.46 MG/DL — HIGH (ref 0.5–1.3)
EOSINOPHIL # BLD AUTO: 0.08 K/UL — SIGNIFICANT CHANGE UP (ref 0–0.5)
EOSINOPHIL NFR BLD AUTO: 1.4 % — SIGNIFICANT CHANGE UP (ref 0–6)
GLUCOSE SERPL-MCNC: 97 MG/DL — SIGNIFICANT CHANGE UP (ref 70–99)
HCT VFR BLD CALC: 19.7 % — CRITICAL LOW (ref 34.5–45)
HCT VFR BLD CALC: 21.4 % — LOW (ref 34.5–45)
HCT VFR BLD CALC: 21.7 % — LOW (ref 34.5–45)
HCT VFR BLD CALC: 22.5 % — LOW (ref 34.5–45)
HGB BLD-MCNC: 6.3 G/DL — CRITICAL LOW (ref 11.5–15.5)
HGB BLD-MCNC: 6.9 G/DL — CRITICAL LOW (ref 11.5–15.5)
HGB BLD-MCNC: 7 G/DL — CRITICAL LOW (ref 11.5–15.5)
HGB BLD-MCNC: 7.5 G/DL — LOW (ref 11.5–15.5)
IMM GRANULOCYTES NFR BLD AUTO: 0.9 % — SIGNIFICANT CHANGE UP (ref 0–1.5)
LYMPHOCYTES # BLD AUTO: 0.6 K/UL — LOW (ref 1–3.3)
LYMPHOCYTES # BLD AUTO: 10.2 % — LOW (ref 13–44)
MAGNESIUM SERPL-MCNC: 1.4 MG/DL — LOW (ref 1.6–2.6)
MCHC RBC-ENTMCNC: 29.4 PG — SIGNIFICANT CHANGE UP (ref 27–34)
MCHC RBC-ENTMCNC: 29.4 PG — SIGNIFICANT CHANGE UP (ref 27–34)
MCHC RBC-ENTMCNC: 29.5 PG — SIGNIFICANT CHANGE UP (ref 27–34)
MCHC RBC-ENTMCNC: 30.1 PG — SIGNIFICANT CHANGE UP (ref 27–34)
MCHC RBC-ENTMCNC: 32 % — SIGNIFICANT CHANGE UP (ref 32–36)
MCHC RBC-ENTMCNC: 32.2 % — SIGNIFICANT CHANGE UP (ref 32–36)
MCHC RBC-ENTMCNC: 32.3 % — SIGNIFICANT CHANGE UP (ref 32–36)
MCHC RBC-ENTMCNC: 33.3 % — SIGNIFICANT CHANGE UP (ref 32–36)
MCV RBC AUTO: 90.4 FL — SIGNIFICANT CHANGE UP (ref 80–100)
MCV RBC AUTO: 91.1 FL — SIGNIFICANT CHANGE UP (ref 80–100)
MCV RBC AUTO: 91.6 FL — SIGNIFICANT CHANGE UP (ref 80–100)
MCV RBC AUTO: 92.1 FL — SIGNIFICANT CHANGE UP (ref 80–100)
MONOCYTES # BLD AUTO: 0.6 K/UL — SIGNIFICANT CHANGE UP (ref 0–0.9)
MONOCYTES NFR BLD AUTO: 10.2 % — SIGNIFICANT CHANGE UP (ref 2–14)
NEUTROPHILS # BLD AUTO: 4.53 K/UL — SIGNIFICANT CHANGE UP (ref 1.8–7.4)
NEUTROPHILS NFR BLD AUTO: 77.1 % — HIGH (ref 43–77)
NRBC # FLD: 0 K/UL — SIGNIFICANT CHANGE UP (ref 0–0)
PHOSPHATE SERPL-MCNC: 2.8 MG/DL — SIGNIFICANT CHANGE UP (ref 2.5–4.5)
PLATELET # BLD AUTO: 100 K/UL — LOW (ref 150–400)
PLATELET # BLD AUTO: 111 K/UL — LOW (ref 150–400)
PLATELET # BLD AUTO: 115 K/UL — LOW (ref 150–400)
PLATELET # BLD AUTO: 144 K/UL — LOW (ref 150–400)
PMV BLD: 8.9 FL — SIGNIFICANT CHANGE UP (ref 7–13)
PMV BLD: 9 FL — SIGNIFICANT CHANGE UP (ref 7–13)
PMV BLD: 9.2 FL — SIGNIFICANT CHANGE UP (ref 7–13)
PMV BLD: SIGNIFICANT CHANGE UP FL (ref 7–13)
POTASSIUM SERPL-MCNC: 3.9 MMOL/L — SIGNIFICANT CHANGE UP (ref 3.5–5.3)
POTASSIUM SERPL-SCNC: 3.9 MMOL/L — SIGNIFICANT CHANGE UP (ref 3.5–5.3)
RBC # BLD: 2.14 M/UL — LOW (ref 3.8–5.2)
RBC # BLD: 2.35 M/UL — LOW (ref 3.8–5.2)
RBC # BLD: 2.37 M/UL — LOW (ref 3.8–5.2)
RBC # BLD: 2.49 M/UL — LOW (ref 3.8–5.2)
RBC # FLD: 15.8 % — HIGH (ref 10.3–14.5)
RBC # FLD: 16.1 % — HIGH (ref 10.3–14.5)
SODIUM SERPL-SCNC: 141 MMOL/L — SIGNIFICANT CHANGE UP (ref 135–145)
WBC # BLD: 5.87 K/UL — SIGNIFICANT CHANGE UP (ref 3.8–10.5)
WBC # BLD: 6.74 K/UL — SIGNIFICANT CHANGE UP (ref 3.8–10.5)
WBC # BLD: 7.52 K/UL — SIGNIFICANT CHANGE UP (ref 3.8–10.5)
WBC # BLD: 7.85 K/UL — SIGNIFICANT CHANGE UP (ref 3.8–10.5)
WBC # FLD AUTO: 5.87 K/UL — SIGNIFICANT CHANGE UP (ref 3.8–10.5)
WBC # FLD AUTO: 6.74 K/UL — SIGNIFICANT CHANGE UP (ref 3.8–10.5)
WBC # FLD AUTO: 7.52 K/UL — SIGNIFICANT CHANGE UP (ref 3.8–10.5)
WBC # FLD AUTO: 7.85 K/UL — SIGNIFICANT CHANGE UP (ref 3.8–10.5)

## 2020-02-12 PROCEDURE — 99233 SBSQ HOSP IP/OBS HIGH 50: CPT

## 2020-02-12 PROCEDURE — 71045 X-RAY EXAM CHEST 1 VIEW: CPT | Mod: 26

## 2020-02-12 PROCEDURE — 99232 SBSQ HOSP IP/OBS MODERATE 35: CPT | Mod: GC

## 2020-02-12 RX ORDER — MAGNESIUM SULFATE 500 MG/ML
1 VIAL (ML) INJECTION ONCE
Refills: 0 | Status: COMPLETED | OUTPATIENT
Start: 2020-02-12 | End: 2020-02-12

## 2020-02-12 RX ORDER — ACETAMINOPHEN 500 MG
650 TABLET ORAL ONCE
Refills: 0 | Status: COMPLETED | OUTPATIENT
Start: 2020-02-12 | End: 2020-02-12

## 2020-02-12 RX ADMIN — OXYCODONE HYDROCHLORIDE 5 MILLIGRAM(S): 5 TABLET ORAL at 00:04

## 2020-02-12 RX ADMIN — SODIUM CHLORIDE 80 MILLILITER(S): 9 INJECTION INTRAMUSCULAR; INTRAVENOUS; SUBCUTANEOUS at 14:11

## 2020-02-12 RX ADMIN — Medication 650 MILLIGRAM(S): at 21:39

## 2020-02-12 RX ADMIN — Medication 100 GRAM(S): at 14:11

## 2020-02-12 RX ADMIN — CHLORHEXIDINE GLUCONATE 1 APPLICATION(S): 213 SOLUTION TOPICAL at 09:19

## 2020-02-12 RX ADMIN — OXYCODONE HYDROCHLORIDE 5 MILLIGRAM(S): 5 TABLET ORAL at 09:20

## 2020-02-12 RX ADMIN — OXYCODONE HYDROCHLORIDE 5 MILLIGRAM(S): 5 TABLET ORAL at 10:00

## 2020-02-12 NOTE — DISCHARGE NOTE PROVIDER - CARE PROVIDERS DIRECT ADDRESSES
,[email or web address] ,[email or web address],DirectAddress_Unknown ,[email or web address],DirectAddress_Unknown,DirectAddress_Unknown

## 2020-02-12 NOTE — DISCHARGE NOTE PROVIDER - NSDCMRMEDTOKEN_GEN_ALL_CORE_FT
aprepitant 80 mg oral capsule: 1 cap(s) orally once a day (in the morning), As Needed  dexamethasone 4 mg oral tablet: 5 tab(s) orally prn the night before and the morning after chemo  gabapentin 300 mg oral capsule: 1 cap(s) orally 3 times a day  losartan 100 mg oral tablet: 1 tab(s) orally once a day  prochlorperazine 10 mg oral tablet: 1 tab(s) orally 3 times a day, As Needed  traMADol 50 mg oral tablet: 1 tab(s) orally every 6 hours, As Needed  Zofran 8 mg oral tablet: 1 tab(s) orally 3 times a day, As Needed acetaminophen 325 mg oral tablet: 2 tab(s) orally every 6 hours, As needed, Temp greater or equal to 38C (100.4F), Mild Pain (1 - 3), Moderate Pain (4 - 6)  bisacodyl 5 mg oral delayed release tablet: 1 tab(s) orally every 12 hours, As needed, Constipation  gabapentin 300 mg oral capsule: 1 cap(s) orally 2 times a day  ondansetron 2 mg/mL injectable solution: 4 milligram(s) injectable every 8 hours, As Needed Nausea/ Vommiting  oxyCODONE 5 mg oral tablet: 1 tab(s) orally every 3 hours, As needed, Severe Pain (7 - 10)  piperacillin-tazobactam: 3.375 gram(s) injectable every 8 hours  through 02/25/2020  polyethylene glycol 3350 oral powder for reconstitution: 17 gram(s) orally once a day

## 2020-02-12 NOTE — PROGRESS NOTE ADULT - ASSESSMENT
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 Anemia due to blood loss. -likely 2/2 vaginal bleed in the setting of uterine cancer.     Seen by Palliative  Patient WANTS CANCER TREATMENT   Ir  ablation of bleed 2/10  Hgb 7.5 and stable  Hospice RN to see patient before HOME

## 2020-02-12 NOTE — PROGRESS NOTE ADULT - SUBJECTIVE AND OBJECTIVE BOX
Patient is a 69y old  Female who presents with a chief complaint of Sent from Onc office for hypotension (11 Feb 2020 16:39)      SUBJECTIVE / OVERNIGHT EVENTS:    MEDICATIONS  (STANDING):  chlorhexidine 4% Liquid 1 Application(s) Topical daily  gabapentin 300 milliGRAM(s) Oral two times a day  lactulose Syrup 10 Gram(s) Oral every 6 hours  polyethylene glycol 3350 17 Gram(s) Oral daily  sodium chloride 0.9%. 1000 milliLiter(s) (80 mL/Hr) IV Continuous <Continuous>    MEDICATIONS  (PRN):  acetaminophen   Tablet .. 650 milliGRAM(s) Oral every 6 hours PRN Temp greater or equal to 38C (100.4F), Mild Pain (1 - 3), Moderate Pain (4 - 6)  acetaminophen  IVPB .. 1000 milliGRAM(s) IV Intermittent once PRN Mild Pain (1 - 3)  bisacodyl 5 milliGRAM(s) Oral every 12 hours PRN Constipation  ondansetron Injectable 4 milliGRAM(s) IV Push every 8 hours PRN Nausea and/or Vomiting  oxyCODONE    IR 5 milliGRAM(s) Oral every 3 hours PRN Severe Pain (7 - 10)      Vital Signs Last 24 Hrs  T(C): 37.1 (12 Feb 2020 06:59), Max: 37.1 (11 Feb 2020 21:18)  T(F): 98.7 (12 Feb 2020 06:59), Max: 98.7 (11 Feb 2020 21:18)  HR: 98 (12 Feb 2020 06:59) (81 - 99)  BP: 119/67 (12 Feb 2020 06:59) (100/67 - 125/62)  BP(mean): --  RR: 18 (12 Feb 2020 06:59) (16 - 18)  SpO2: 99% (12 Feb 2020 06:59) (99% - 100%)      PHYSICAL EXAM:  GENERAL: NAD, well-developed  HEAD:  Atraumatic, Normocephalic  EYES: EOMI, PERRLA, conjunctiva and sclera clear  NECK: Supple, No JVD  CHEST/LUNG: Clear to auscultation bilaterally; No wheeze  HEART: Regular rate and rhythm; No murmurs, rubs, or gallops  ABDOMEN: Soft, Nontender, Nondistended; Bowel sounds present  EXTREMITIES:  2+ Peripheral Pulses, No clubbing, cyanosis, or edema  PSYCH: AAOx3  NEUROLOGY: non-focal  SKIN: No rashes or lesions    LABS:                        7.0    5.87  )-----------( 115      ( 12 Feb 2020 06:00 )             21.7     02-12    141  |  109<H>  |  15  ----------------------------<  97  3.9   |  20<L>  |  1.46<H>    Ca    8.4      12 Feb 2020 06:00  Phos  2.8     02-12  Mg     1.4     02-12                RADIOLOGY & ADDITIONAL TESTS:    Imaging Personally Reviewed:    Consultant(s) Notes Reviewed:      Care Discussed with Consultants/Other Providers: Patient is a 69y old  Female who presents with a chief complaint of Sent from Onc office for hypotension (11 Feb 2020 16:39)      SUBJECTIVE / OVERNIGHT EVENTS:  patient sitting in chair.  no overt bleeding  patient notes no more bleeding.  patient wants to talk to hospice RN    MEDICATIONS  (STANDING):  chlorhexidine 4% Liquid 1 Application(s) Topical daily  gabapentin 300 milliGRAM(s) Oral two times a day  lactulose Syrup 10 Gram(s) Oral every 6 hours  polyethylene glycol 3350 17 Gram(s) Oral daily  sodium chloride 0.9%. 1000 milliLiter(s) (80 mL/Hr) IV Continuous <Continuous>    MEDICATIONS  (PRN):  acetaminophen   Tablet .. 650 milliGRAM(s) Oral every 6 hours PRN Temp greater or equal to 38C (100.4F), Mild Pain (1 - 3), Moderate Pain (4 - 6)  acetaminophen  IVPB .. 1000 milliGRAM(s) IV Intermittent once PRN Mild Pain (1 - 3)  bisacodyl 5 milliGRAM(s) Oral every 12 hours PRN Constipation  ondansetron Injectable 4 milliGRAM(s) IV Push every 8 hours PRN Nausea and/or Vomiting  oxyCODONE    IR 5 milliGRAM(s) Oral every 3 hours PRN Severe Pain (7 - 10)      Vital Signs Last 24 Hrs  T(C): 37.1 (12 Feb 2020 06:59), Max: 37.1 (11 Feb 2020 21:18)  T(F): 98.7 (12 Feb 2020 06:59), Max: 98.7 (11 Feb 2020 21:18)  HR: 98 (12 Feb 2020 06:59) (81 - 99)  BP: 119/67 (12 Feb 2020 06:59) (100/67 - 125/62)  BP(mean): --  RR: 18 (12 Feb 2020 06:59) (16 - 18)  SpO2: 99% (12 Feb 2020 06:59) (99% - 100%)      PHYSICAL EXAM:  GENERAL: NAD, well-developed  HEAD:  Atraumatic, Normocephalic  EYES: EOMI, PERRLA, conjunctiva and sclera clear  NECK: Supple, No JVD  CHEST/LUNG: Clear to auscultation bilaterally; No wheeze  HEART: Regular rate and rhythm; No murmurs, rubs, or gallops  ABDOMEN: Soft, Nontender, Nondistended; Bowel sounds present  EXTREMITIES:  2+ Peripheral Pulses, No clubbing, cyanosis, or edema  PSYCH: AAOx3  NEUROLOGY: non-focal  SKIN: No rashes or lesions    LABS:                        7.0    5.87  )-----------( 115      ( 12 Feb 2020 06:00 )             21.7     02-12    141  |  109<H>  |  15  ----------------------------<  97  3.9   |  20<L>  |  1.46<H>    Ca    8.4      12 Feb 2020 06:00  Phos  2.8     02-12  Mg     1.4     02-12            Care Discussed with Consultants/Other Providers:  pal- Noted patient wanted Hospice information  onc- they will have her f/u with Dr munoz out patient

## 2020-02-12 NOTE — PROGRESS NOTE ADULT - ASSESSMENT
68 yo  with stage IV high-grade uterine carcinosarcoma dx 3/2019, s/p carbo/taxol 7 cycles followed by single agent carboplatin last dose 2019 [place] [facility] [place]    # Stage IV uterine carcinosarcoma  - CT C/A/P  WO showing increased size of endometrial and cervical mass along with a new left para-aortic mass consistent with metastatic disease. Redemonstration of markedly heterogeneous distention of the endometrial cavity with central mass measuring up to 11.2 cm. Redemonstration of small foci of air in a distended cervix, which overall measures up to 8.5 cm in transverse diameter, reduced in size from prior study.   - bleeding coming from mass, no GYN intervention at this time.    - s/p 5/5 fractions RT  - at this time, has stopped bleeding and hg stable  - s/p IR embolization 2/10, s/p Left Uterine Artery Embolization.   - c/w IVF for STEVEN, Cr stable.  - Per Dr. Garcia, she does not think at this time chemotherapy will provide any more benefit.    - bowel regimen and pain control.  - transfuse if hg < 7.0 or pt symptomatic 70 yo  with stage IV high-grade uterine carcinosarcoma dx 3/2019, s/p carbo/taxol 7 cycles followed by single agent carboplatin last dose 2019 [place] [facility] [place]    # Stage IV uterine carcinosarcoma  - CT C/A/P  WO showing increased size of endometrial and cervical mass along with a new left para-aortic mass consistent with metastatic disease. Redemonstration of markedly heterogeneous distention of the endometrial cavity with central mass measuring up to 11.2 cm. Redemonstration of small foci of air in a distended cervix, which overall measures up to 8.5 cm in transverse diameter, reduced in size from prior study.   - bleeding coming from mass, no GYN intervention at this time.    - s/p 5/5 fractions RT  - at this time, has stopped bleeding and hg stable  - s/p IR embolization 2/10, s/p Left Uterine Artery Embolization.   - c/w IVF for STEVEN, Cr stable.  - Per Dr. Garcia, she does not think at this time chemotherapy will provide any more benefit.    - bowel regimen and pain control.  - transfuse if hg < 7.0 or pt symptomatic  - Plan for discharge with potential hospice. 70 yo  with stage IV high-grade uterine carcinosarcoma dx 3/2019, s/p carbo/taxol 7 cycles followed by single agent carboplatin last dose 2019 presenting for hypotension from [facility] [place].    # Stage IV uterine carcinosarcoma  - CT C/A/P  WO showing increased size of endometrial and cervical mass along with a new left para-aortic mass consistent with metastatic disease. Redemonstration of markedly heterogeneous distention of the endometrial cavity with central mass measuring up to 11.2 cm. Redemonstration of small foci of air in a distended cervix, which overall measures up to 8.5 cm in transverse diameter, reduced in size from prior study.   - bleeding coming from mass, no GYN intervention at this time.    - s/p 5/5 fractions RT  - at this time, has stopped bleeding and hg stable  - s/p IR embolization 2/10, s/p Left Uterine Artery Embolization.   - c/w IVF for STEVEN, Cr stable.  - Per Dr. Garcia, she does not think at this time chemotherapy will provide any more benefit. However patient still wants to meet Dr. Garcia at [facility] and discuss with her post discharge.  - bowel regimen and pain control.  - transfuse if hg < 7.0 or pt symptomatic  - Plan for discharge with potential hospice, patient is also interested in it so please refer hospice.

## 2020-02-12 NOTE — DISCHARGE NOTE PROVIDER - CARE PROVIDER_API CALL
Sona Garcia)  Hematology; Internal Medicine; Medical Oncology  [address] [place] [address]  Phone: [phone]  Fax: [phone]  Follow Up Time:     Maira Khalil)  [facility] Medicine; Internal Medicine  [address] [place] [address]  Phone: [phone]  Fax: [phone]  Follow Up Time: Sona Garcia)  Hematology; Internal Medicine; Medical Oncology  [address] [place] [address]  Phone: [phone]  Fax: [phone]  Follow Up Time:     Maira Khalil)  [facility] Medicine  [address] [place] [address]  Phone: [phone]  Fax: [phone]  Follow Up Time:     [facility]   Phone: [phone]  Fax: (   )    -  Follow Up Time: Sona Garcia)  Hematology; Internal Medicine; Medical Oncology  [address] [place] [address]  Phone: [phone]  Fax: [phone]  Follow Up Time:     Maira Khalil)  [facility]; Internal Medicine  [address] [place] [address]  Phone: [phone]  Fax: [phone]  Follow Up Time:     Health system,   Phone: [phone]  Fax: (   )    -  Follow Up Time:     HOSPICE CARE at [facility],   Phone: (   )    -  Fax: (   )    -  Follow Up Time:

## 2020-02-12 NOTE — DISCHARGE NOTE PROVIDER - NSDCCPCAREPLAN_GEN_ALL_CORE_FT
PRINCIPAL DISCHARGE DIAGNOSIS  Diagnosis: Anemia  Assessment and Plan of Treatment: Acute blood loss sec to bleeding cancer mass vaginally- treated with RT and IR Ablation. PRINCIPAL DISCHARGE DIAGNOSIS  Diagnosis: Anemia  Assessment and Plan of Treatment: Acute blood loss sec to bleeding cancer mass vaginally- treated with RT and IR Ablation. Bleed now stable.  Ready for [place] PRINCIPAL DISCHARGE DIAGNOSIS  Diagnosis: Anemia  Assessment and Plan of Treatment: Acute blood loss sec to bleeding cancer mass vaginally- treated with RT and IR Ablation. Bleed now stable.  Ready for [place]

## 2020-02-12 NOTE — PROGRESS NOTE ADULT - SUBJECTIVE AND OBJECTIVE BOX
Oncology Follow-up    INTERVAL HPI/OVERNIGHT EVENTS:  Patient S&E at bedside. No o/n events, patient resting comfortably. No complaints at this time. Patient denies fever, chills, dizziness, weakness, CP, palpitations, SOB, cough, N/V/D/C, dysuria, changes in bowel movements, LE edema.    VITAL SIGNS:  T(F): 98.7 (02-12-20 @ 06:59)  HR: 98 (02-12-20 @ 06:59)  BP: 119/67 (02-12-20 @ 06:59)  RR: 18 (02-12-20 @ 06:59)  SpO2: 99% (02-12-20 @ 06:59)  Wt(kg): --    PHYSICAL EXAM:    Constitutional: AAOx3, NAD   Eyes: PERRL, EOMI, sclera non-icteric  Neck: supple, no masses, no JVD  Respiratory: CTA b/l, good air entry b/l, no wheezing, rhonchi, rales, with normal respiratory effort and no intercostal retractions  Cardiovascular: RRR, normal S1S2, no M/R/G  Gastrointestinal: soft, NTND, no masses palpable, BS normal in all four quadrants, no HSM  Extremities:  no c/c/e  Neurological: Grossly intact  Skin: Normal temperature    MEDICATIONS  (STANDING):  chlorhexidine 4% Liquid 1 Application(s) Topical daily  gabapentin 300 milliGRAM(s) Oral two times a day  lactulose Syrup 10 Gram(s) Oral every 6 hours  polyethylene glycol 3350 17 Gram(s) Oral daily  sodium chloride 0.9%. 1000 milliLiter(s) (80 mL/Hr) IV Continuous <Continuous>    MEDICATIONS  (PRN):  acetaminophen   Tablet .. 650 milliGRAM(s) Oral every 6 hours PRN Temp greater or equal to 38C (100.4F), Mild Pain (1 - 3), Moderate Pain (4 - 6)  acetaminophen  IVPB .. 1000 milliGRAM(s) IV Intermittent once PRN Mild Pain (1 - 3)  bisacodyl 5 milliGRAM(s) Oral every 12 hours PRN Constipation  ondansetron Injectable 4 milliGRAM(s) IV Push every 8 hours PRN Nausea and/or Vomiting  oxyCODONE    IR 5 milliGRAM(s) Oral every 3 hours PRN Severe Pain (7 - 10)      No Known Allergies      LABS:                        7.5    7.52  )-----------( 144      ( 12 Feb 2020 07:58 )             22.5     02-12    141  |  109<H>  |  15  ----------------------------<  97  3.9   |  20<L>  |  1.46<H>    Ca    8.4      12 Feb 2020 06:00  Phos  2.8     02-12  Mg     1.4     02-12                 RADIOLOGY & ADDITIONAL TESTS:  Studies reviewed. Oncology Follow-up    INTERVAL HPI/OVERNIGHT EVENTS:  Patient S&E at bedside. No o/n events, patient resting comfortably. No complaints at this time. Patient denies fever, chills, dizziness, weakness, CP, palpitations, SOB, cough, N/V/D/C, dysuria, changes in bowel movements, LE edema.    VITAL SIGNS:  T(F): 98.7 (02-12-20 @ 06:59)  HR: 98 (02-12-20 @ 06:59)  BP: 119/67 (02-12-20 @ 06:59)  RR: 18 (02-12-20 @ 06:59)  SpO2: 99% (02-12-20 @ 06:59)  Wt(kg): --    PHYSICAL EXAM:    Constitutional: AAOx3, NAD   Eyes: PERRL, EOMI, sclera non-icteric  Neck: supple, no masses, no JVD  Respiratory: CTA b/l, good air entry b/l, no wheezing, rhonchi, rales, with normal respiratory effort and no intercostal retractions  Cardiovascular: RRR, normal S1S2, no M/R/G  Gastrointestinal: soft, NTND, no masses palpable, BS normal in all four quadrants.  Extremities:  no c/c/e  Neurological: Grossly intact  Skin: Normal temperature    MEDICATIONS  (STANDING):  chlorhexidine 4% Liquid 1 Application(s) Topical daily  gabapentin 300 milliGRAM(s) Oral two times a day  lactulose Syrup 10 Gram(s) Oral every 6 hours  polyethylene glycol 3350 17 Gram(s) Oral daily  sodium chloride 0.9%. 1000 milliLiter(s) (80 mL/Hr) IV Continuous <Continuous>    MEDICATIONS  (PRN):  acetaminophen   Tablet .. 650 milliGRAM(s) Oral every 6 hours PRN Temp greater or equal to 38C (100.4F), Mild Pain (1 - 3), Moderate Pain (4 - 6)  acetaminophen  IVPB .. 1000 milliGRAM(s) IV Intermittent once PRN Mild Pain (1 - 3)  bisacodyl 5 milliGRAM(s) Oral every 12 hours PRN Constipation  ondansetron Injectable 4 milliGRAM(s) IV Push every 8 hours PRN Nausea and/or Vomiting  oxyCODONE    IR 5 milliGRAM(s) Oral every 3 hours PRN Severe Pain (7 - 10)      No Known Allergies      LABS:                        7.5    7.52  )-----------( 144      ( 12 Feb 2020 07:58 )             22.5     02-12    141  |  109<H>  |  15  ----------------------------<  97  3.9   |  20<L>  |  1.46<H>    Ca    8.4      12 Feb 2020 06:00  Phos  2.8     02-12  Mg     1.4     02-12        RADIOLOGY & ADDITIONAL TESTS:  Studies reviewed. Oncology Follow-up    INTERVAL HPI/OVERNIGHT EVENTS:  Patient S&E at bedside. No o/n events, patient resting comfortably. No complaints at this time. Patient denies fever, chills, dizziness, weakness, CP, palpitations, SOB, cough, N/V/D/C, dysuria, changes in bowel movements, LE edema.    VITAL SIGNS:  T(F): 98.7 (02-12-20 @ 06:59)  HR: 98 (02-12-20 @ 06:59)  BP: 119/67 (02-12-20 @ 06:59)  RR: 18 (02-12-20 @ 06:59)  SpO2: 99% (02-12-20 @ 06:59)  Wt(kg): --    PHYSICAL EXAM:    Constitutional: AAOx3, NAD   Eyes: PERRL, EOMI, sclera non-icteric  Neck: supple, no masses, no JVD  Respiratory: CTA b/l, good air entry b/l, no wheezing, rhonchi, rales, with normal respiratory effort and no intercostal retractions  Cardiovascular: RRR, normal S1S2, no M/R/G  Gastrointestinal: soft, mild lower abdominal tenderness upon palpation, BS normal in all four quadrants.  Extremities: no c/c/e  Neurological: Grossly intact  Skin: Normal temperature    MEDICATIONS  (STANDING):  chlorhexidine 4% Liquid 1 Application(s) Topical daily  gabapentin 300 milliGRAM(s) Oral two times a day  lactulose Syrup 10 Gram(s) Oral every 6 hours  polyethylene glycol 3350 17 Gram(s) Oral daily  sodium chloride 0.9%. 1000 milliLiter(s) (80 mL/Hr) IV Continuous <Continuous>    MEDICATIONS  (PRN):  acetaminophen   Tablet .. 650 milliGRAM(s) Oral every 6 hours PRN Temp greater or equal to 38C (100.4F), Mild Pain (1 - 3), Moderate Pain (4 - 6)  acetaminophen  IVPB .. 1000 milliGRAM(s) IV Intermittent once PRN Mild Pain (1 - 3)  bisacodyl 5 milliGRAM(s) Oral every 12 hours PRN Constipation  ondansetron Injectable 4 milliGRAM(s) IV Push every 8 hours PRN Nausea and/or Vomiting  oxyCODONE    IR 5 milliGRAM(s) Oral every 3 hours PRN Severe Pain (7 - 10)      No Known Allergies      LABS:                        7.5    7.52  )-----------( 144      ( 12 Feb 2020 07:58 )             22.5     02-12    141  |  109<H>  |  15  ----------------------------<  97  3.9   |  20<L>  |  1.46<H>    Ca    8.4      12 Feb 2020 06:00  Phos  2.8     02-12  Mg     1.4     02-12        RADIOLOGY & ADDITIONAL TESTS:  Studies reviewed.

## 2020-02-12 NOTE — PROGRESS NOTE ADULT - ATTENDING COMMENTS
Pt seen examined and d/w fellow. Agree with above A/P. Pt feels better post embolization ; c/o mild abd pain (less). PE remarkable for mod distended abd with mild central tenderness, no rebound. A/P Have d/w pt her clinical course and rad findings and that per Dr Gracia there is no treasonable further therapy with chance of improving QOL . Have d/w pt the option of supportive care / hospice and she wishes to think about it / see a SW or hospice representative to learn more about the optios but still wishes to f/[place] with Dr Garcia as outpt prior to final decision. Agre with recmmendations re h/h / transfusion if needed.

## 2020-02-12 NOTE — PROGRESS NOTE ADULT - ATTENDING COMMENTS
cbc this PM   If Hbg <7 will do PRBC cbc this PM   If Hbg <7 will do PRBC    Onc  "- bleeding coming from mass, no GYN intervention at this time.    - s/p 5/5 fractions RT  - at this time, has stopped bleeding and hg stable  - s/p IR embolization 2/10, s/p Left Uterine Artery Embolization.   - c/w IVF for STEVEN, Cr stable.  - Per Dr. Garcia, she does not think at this time chemotherapy will provide any more benefit.    - bowel regimen and pain control.  - transfuse if hg < 7.0 or pt symptomatic    Oncology to talk to patient about Out patient plan or no role for plan Repeat Hgb 7.5  Hospice Rn to see  Pt ana laura  d/c planning for home with ??????

## 2020-02-12 NOTE — DISCHARGE NOTE PROVIDER - HOSPITAL COURSE
68 YO F with MHx of HTN and Stage IV uterine  carcinosarcoma (on Carboplatin) who presents from Oncologists office for hypotension. Pt states that she had a regular f/u with the oncologist today, but for the past week she has been having increased vaginal bleeding, now with 3 pads per day and a large clot discharge yesterday, previously was using about one pad per day with on/off vaginal bleeding since her diagnosis 11 months ago. At the office, her BP was 87/56 and complained of some lightheadedness/dizziness, but denies any syncope or falls. Pt states that she has also been constipated for the past 4 days and vomited once- nonbilious and nonbloody emesis, but is still passing flatus, and denies any black or red colored stools, and denies any nausea or vomiting at this time. She denies any light headedness, dizziness, chest pain, palpitations, LOPEZ        HOSPITAL COURSE:    Patient was noted to have bleeding vaginally    Seen by GYN - Onc- no int by tgem rec RT    RT started treatment but patient continued to need PRBC    Ir was consulted nd did ablation 2/10/2020.    Hospital course complicated by STEVEN- resolved with ivf hydration. Onc noted out patient follow up with Dr Garcia but patient can also be a candidate for hospice.    Palliative did pain control    Hospice RN called- noted patient lives in the [place] and will benefit from [facility] hospice home care.    D/c home 2/13/2020 to f/u with Onc- Dr Garcia/ Dr Nagi Emmanuel and with [facility] in [place] 68 YO F with MHx of HTN and Stage IV uterine  carcinosarcoma (on Carboplatin) who presents from Oncologists office for hypotension. Pt states that she had a regular f/u with the oncologist today, but for the past week she has been having increased vaginal bleeding, now with 3 pads per day and a large clot discharge yesterday, previously was using about one pad per day with on/off vaginal bleeding since her diagnosis 11 months ago. At the office, her BP was 87/56 and complained of some lightheadedness/dizziness, but denies any syncope or falls. Pt states that she has also been constipated for the past 4 days and vomited once- nonbilious and nonbloody emesis, but is still passing flatus, and denies any black or red colored stools, and denies any nausea or vomiting at this time. She denies any light headedness, dizziness, chest pain, palpitations, LOPEZ        HOSPITAL COURSE:    Patient was noted to have bleeding vaginally and ACUTE BLOOD LOSS ANEMIA Sec to Bleed from Uterine cancer. Hgb was 5.9 on admission and treated PRBC.    Seen by GYN - Onc- no intervention recommended  by  rec RT eval as well as oncology eval.    RT started treatment  but patient continued to need PRBC    Ir was consulted and did ablation 2/10/2020. but patient continued to bleed vaginally requiring PRBC.    Hospital course complicated by STEVEN- resolved with ivf hydration. Onc noted out patient follow up with Dr Garcia but patient can also be a candidate for hospice.    Palliative did pain control    Hospice RN called- noted patient lives in the [place] and will benefit from [facility] hospice home care.    Patient was noted to have fever- Started on Vanco and Zosyn            Anemia due to blood loss.     -likely 2/2 vaginal bleed in the setting of uterine cancer.     -hgb=5.9, baseline 8-10    -gyn onc consulted     -maintain active t/s    -RT consulted: pall RT x5 dx (1/30-2/5)    -IR consulted for eval: no emergent indication for uterine artery embolization    -1 PRBC on 1/30, 2/1, 2/2, 2/3, 2/9     -CTA abd/pelvis w/ IV con: No significant interval change in size of endometrial mass. Mild decrease in degree of cervical distention with small foci of air again noted.No evidence of active bleeding.Interval increase in size of previously seen left adrenal heterogeneous mass, likely representing metastatic disease.No new acute intra-abdominal pathology    -IR Embolization for bleeding 2/7-(Cancelled)    -IR embolization on 2/10 - with improvement         STEVEN (acute kidney injury).       -Cr=2.37, baseline 1.5=-1.7    -likely pre renal in the setting of acute blood loss anemia    -getting second PRBC now    -s/p 1L IVF in the ED    -s/p NS @100cc/hr after blood transfusion    -continue to monitor.         UTI    - Fever on 1/19    - CXR: clear    - UA w/ + leuk, neg nitrite --> s/p CTX    - 1/29: Bcx -neg     - 2/12 bcx: Clostridium Innocuum    - 2/13 Ucx: enterococcus faecalis 10-49k     -2/15-Bcx-neg        Constipation.     -pt passing flatus at this time    -start on miralax    -continue to monitor.         Malignant neoplasm of uterus, unspecified site. Hypertension 68 YO F with MHx of HTN and Stage IV uterine  carcinosarcoma (on Carboplatin) who presents from Oncologists office for hypotension. Pt states that she had a regular f/u with the oncologist today, but for the past week she has been having increased vaginal bleeding, now with 3 pads per day and a large clot discharge yesterday, previously was using about one pad per day with on/off vaginal bleeding since her diagnosis 11 months ago. At the office, her BP was 87/56 and complained of some lightheadedness/dizziness, but denies any syncope or falls. Pt states that she has also been constipated for the past 4 days and vomited once- nonbilious and nonbloody emesis, but is still passing flatus, and denies any black or red colored stools, and denies any nausea or vomiting at this time. She denies any light headedness, dizziness, chest pain, palpitations, LOPEZ        HOSPITAL COURSE:    Patient was noted to have bleeding vaginally and ACUTE BLOOD LOSS ANEMIA Sec to Bleed from Uterine cancer. Hgb was 5.9 on admission and treated PRBC.    Seen by GYN - Onc- no intervention recommended  by  rec RT eval as well as oncology eval.    RT started treatment  but patient continued to need PRBC    Ir was consulted and did ablation 2/10/2020. but patient continued to bleed vaginally requiring PRBC.    Hospital course complicated by CURT- resolved with ivf hydration. Onc noted out patient follow up with Dr Garcia but patient can also be a candidate for hospice.    Palliative did pain control    Hospice RN called- noted patient lives in the [place] and will benefit from [facility] hospice home care.    Patient was noted to have fever-2/12/2020 Started  Zosyn.2/12 bcx: Clostridium Innocuum    blood cultures 1/2 positive with f/u cultures negative    Urine grew E fecalis- she was teated with Zosyn with rec of antibiotics for 10 to 14 days.    Curt improved again post Ct with hydration.    Id noted with repeat blood cultures negative she colud be switched to Cipro/ Flagyl to complete 14 days total.    She was eval for [facility] and was d/c to [place] on..........            CURT (acute kidney injury).       -Cr=2.37, baseline 1.5=-1.7    -likely pre renal in the setting of acute blood loss anemia    -getting second PRBC now    -s/p 1L IVF in the ED    -s/p NS @100cc/hr after blood transfusion    -continue to monit 70 YO F with MHx of HTN and Stage IV uterine  carcinosarcoma (on Carboplatin) who presents from Oncologists office for hypotension. Pt states that she had a regular f/u with the oncologist today, but for the past week she has been having increased vaginal bleeding, now with 3 pads per day and a large clot discharge yesterday, previously was using about one pad per day with on/off vaginal bleeding since her diagnosis 11 months ago. At the office, her BP was 87/56 and complained of some lightheadedness/dizziness, but denies any syncope or falls. Pt states that she has also been constipated for the past 4 days and vomited once- nonbilious and nonbloody emesis, but is still passing flatus, and denies any black or red colored stools, and denies any nausea or vomiting at this time. She denies any light headedness, dizziness, chest pain, palpitations, LOPEZ        HOSPITAL COURSE:    Patient was noted to have bleeding vaginally and ACUTE BLOOD LOSS ANEMIA Sec to Bleed from Uterine cancer. Hgb was 5.9 on admission and treated PRBC.    Seen by GYN - Onc- no intervention recommended  by  rec RT eval as well as oncology eval.    RT started treatment  but patient continued to need PRBC    Ir was consulted and did ablation 2/10/2020. but patient continued to bleed vaginally requiring PRBC.    Hospital course complicated by CURT- resolved with ivf hydration. Onc noted out patient follow up with Dr Garcia but patient can also be a candidate for hospice.    Palliative did pain control    Hospice RN called- noted patient lives in the [place] and will benefit from [facility] hospice home care.    Patient was noted to have fever-2/12/2020 Started  Zosyn.2/12 bcx: Clostridium Innocuum    blood cultures 1/2 positive with f/u cultures negative    Urine grew E fecalis- she was teated with Zosyn with rec of antibiotics for 10 to 14 days.    Curt improved again post Ct with hydration.    Id noted with repeat blood cultures negative she colud be switched to Cipro/ Flagyl to complete 14 days total.    She was eval for [facility] and was d/c to [place] on 2/20/2020            CURT (acute kidney injury).       -Cr=2.37, baseline 1.5=-1.7    -likely pre renal in the setting of acute blood loss anemia    -getting second PRBC now    -s/p 1L IVF in the ED    -s/p NS @100cc/hr after blood transfusion    -continue to monit

## 2020-02-12 NOTE — DISCHARGE NOTE PROVIDER - PROVIDER TOKENS
PROVIDER:[TOKEN:[[id number]]],PROVIDER:[TOKEN:[[id number]]] PROVIDER:[TOKEN:[[id number]]],PROVIDER:[TOKEN:[[id number]]],FREE:[LAST[facility]PHONE:[phone]],FAX:[(   )    -]] PROVIDER:[TOKEN:[[id number]]],PROVIDER:[[id number]]],FREE:[LAST:[[facility]],PHONE:[phone]],FAX:[(   )    -]],FREE:[LAST:[HOSPICE CARE at [facility]],PHONE:[(   )    -],FAX:[(   )    -]]

## 2020-02-12 NOTE — PROVIDER CONTACT NOTE (CRITICAL VALUE NOTIFICATION) - ASSESSMENT
Pt bleeding vaginally. Thick clots noted, 2 estefany pads saturated thus far this shift.
Vaginal bleeding, one pad saturated 2/1 evening  Vital signs stable
Vital signs stable. Patient reports some spotting through peripad, otherwise much better than before embolization
no distress noted no bleeding noted
Current CBC 6.7/20. Patient asymptomatic
Current H/G 6.6/20.6, Patient asymptomatic
Patient states feeling weak. Asymptomatic otherwise.

## 2020-02-13 DIAGNOSIS — R50.9 FEVER, UNSPECIFIED: ICD-10-CM

## 2020-02-13 LAB
ANION GAP SERPL CALC-SCNC: 12 MMO/L — SIGNIFICANT CHANGE UP (ref 7–14)
APPEARANCE UR: SIGNIFICANT CHANGE UP
B PERT DNA SPEC QL NAA+PROBE: NOT DETECTED — SIGNIFICANT CHANGE UP
BACTERIA # UR AUTO: SIGNIFICANT CHANGE UP
BILIRUB UR-MCNC: NEGATIVE — SIGNIFICANT CHANGE UP
BLD GP AB SCN SERPL QL: NEGATIVE — SIGNIFICANT CHANGE UP
BLOOD UR QL VISUAL: HIGH
BUN SERPL-MCNC: 12 MG/DL — SIGNIFICANT CHANGE UP (ref 7–23)
C PNEUM DNA SPEC QL NAA+PROBE: NOT DETECTED — SIGNIFICANT CHANGE UP
CALCIUM SERPL-MCNC: 8.7 MG/DL — SIGNIFICANT CHANGE UP (ref 8.4–10.5)
CHLORIDE SERPL-SCNC: 108 MMOL/L — HIGH (ref 98–107)
CO2 SERPL-SCNC: 20 MMOL/L — LOW (ref 22–31)
COLOR SPEC: YELLOW — SIGNIFICANT CHANGE UP
CREAT SERPL-MCNC: 1.39 MG/DL — HIGH (ref 0.5–1.3)
FLUAV H1 2009 PAND RNA SPEC QL NAA+PROBE: NOT DETECTED — SIGNIFICANT CHANGE UP
FLUAV H1 RNA SPEC QL NAA+PROBE: NOT DETECTED — SIGNIFICANT CHANGE UP
FLUAV H3 RNA SPEC QL NAA+PROBE: NOT DETECTED — SIGNIFICANT CHANGE UP
FLUAV SUBTYP SPEC NAA+PROBE: NOT DETECTED — SIGNIFICANT CHANGE UP
FLUBV RNA SPEC QL NAA+PROBE: NOT DETECTED — SIGNIFICANT CHANGE UP
GLUCOSE SERPL-MCNC: 106 MG/DL — HIGH (ref 70–99)
GLUCOSE UR-MCNC: NEGATIVE — SIGNIFICANT CHANGE UP
HADV DNA SPEC QL NAA+PROBE: NOT DETECTED — SIGNIFICANT CHANGE UP
HCOV PNL SPEC NAA+PROBE: SIGNIFICANT CHANGE UP
HCT VFR BLD CALC: 24 % — LOW (ref 34.5–45)
HCT VFR BLD CALC: 24.8 % — LOW (ref 34.5–45)
HGB BLD-MCNC: 8 G/DL — LOW (ref 11.5–15.5)
HGB BLD-MCNC: 8.1 G/DL — LOW (ref 11.5–15.5)
HMPV RNA SPEC QL NAA+PROBE: NOT DETECTED — SIGNIFICANT CHANGE UP
HPIV1 RNA SPEC QL NAA+PROBE: NOT DETECTED — SIGNIFICANT CHANGE UP
HPIV2 RNA SPEC QL NAA+PROBE: NOT DETECTED — SIGNIFICANT CHANGE UP
HPIV3 RNA SPEC QL NAA+PROBE: NOT DETECTED — SIGNIFICANT CHANGE UP
HPIV4 RNA SPEC QL NAA+PROBE: NOT DETECTED — SIGNIFICANT CHANGE UP
KETONES UR-MCNC: NEGATIVE — SIGNIFICANT CHANGE UP
LEUKOCYTE ESTERASE UR-ACNC: HIGH
MAGNESIUM SERPL-MCNC: 1.6 MG/DL — SIGNIFICANT CHANGE UP (ref 1.6–2.6)
MCHC RBC-ENTMCNC: 28.9 PG — SIGNIFICANT CHANGE UP (ref 27–34)
MCHC RBC-ENTMCNC: 29.3 PG — SIGNIFICANT CHANGE UP (ref 27–34)
MCHC RBC-ENTMCNC: 32.7 % — SIGNIFICANT CHANGE UP (ref 32–36)
MCHC RBC-ENTMCNC: 33.3 % — SIGNIFICANT CHANGE UP (ref 32–36)
MCV RBC AUTO: 87.9 FL — SIGNIFICANT CHANGE UP (ref 80–100)
MCV RBC AUTO: 88.6 FL — SIGNIFICANT CHANGE UP (ref 80–100)
NITRITE UR-MCNC: NEGATIVE — SIGNIFICANT CHANGE UP
NRBC # FLD: 0 K/UL — SIGNIFICANT CHANGE UP (ref 0–0)
NRBC # FLD: 0 K/UL — SIGNIFICANT CHANGE UP (ref 0–0)
PH UR: 6 — SIGNIFICANT CHANGE UP (ref 5–8)
PHOSPHATE SERPL-MCNC: 2.5 MG/DL — SIGNIFICANT CHANGE UP (ref 2.5–4.5)
PLATELET # BLD AUTO: 108 K/UL — LOW (ref 150–400)
PLATELET # BLD AUTO: 116 K/UL — LOW (ref 150–400)
PMV BLD: 9 FL — SIGNIFICANT CHANGE UP (ref 7–13)
PMV BLD: 9.4 FL — SIGNIFICANT CHANGE UP (ref 7–13)
POTASSIUM SERPL-MCNC: 3.5 MMOL/L — SIGNIFICANT CHANGE UP (ref 3.5–5.3)
POTASSIUM SERPL-SCNC: 3.5 MMOL/L — SIGNIFICANT CHANGE UP (ref 3.5–5.3)
PROT UR-MCNC: 100 — HIGH
RBC # BLD: 2.73 M/UL — LOW (ref 3.8–5.2)
RBC # BLD: 2.8 M/UL — LOW (ref 3.8–5.2)
RBC # FLD: 16.8 % — HIGH (ref 10.3–14.5)
RBC # FLD: 17.3 % — HIGH (ref 10.3–14.5)
RBC CASTS # UR COMP ASSIST: HIGH (ref 0–?)
RH IG SCN BLD-IMP: POSITIVE — SIGNIFICANT CHANGE UP
RSV RNA SPEC QL NAA+PROBE: NOT DETECTED — SIGNIFICANT CHANGE UP
RV+EV RNA SPEC QL NAA+PROBE: NOT DETECTED — SIGNIFICANT CHANGE UP
SODIUM SERPL-SCNC: 140 MMOL/L — SIGNIFICANT CHANGE UP (ref 135–145)
SP GR SPEC: 1.01 — SIGNIFICANT CHANGE UP (ref 1–1.04)
SPECIMEN SOURCE: SIGNIFICANT CHANGE UP
SPECIMEN SOURCE: SIGNIFICANT CHANGE UP
SQUAMOUS # UR AUTO: SIGNIFICANT CHANGE UP
UROBILINOGEN FLD QL: NORMAL — SIGNIFICANT CHANGE UP
WBC # BLD: 7.68 K/UL — SIGNIFICANT CHANGE UP (ref 3.8–10.5)
WBC # BLD: 7.78 K/UL — SIGNIFICANT CHANGE UP (ref 3.8–10.5)
WBC # FLD AUTO: 7.68 K/UL — SIGNIFICANT CHANGE UP (ref 3.8–10.5)
WBC # FLD AUTO: 7.78 K/UL — SIGNIFICANT CHANGE UP (ref 3.8–10.5)
WBC CLUMPS #/AREA URNS HPF: PRESENT — HIGH (ref 0–?)
WBC UR QL: >50 — HIGH (ref 0–?)

## 2020-02-13 PROCEDURE — 99233 SBSQ HOSP IP/OBS HIGH 50: CPT

## 2020-02-13 RX ORDER — OXYCODONE HYDROCHLORIDE 5 MG/1
5 TABLET ORAL
Refills: 0 | Status: DISCONTINUED | OUTPATIENT
Start: 2020-02-13 | End: 2020-02-20

## 2020-02-13 RX ADMIN — Medication 650 MILLIGRAM(S): at 21:44

## 2020-02-13 RX ADMIN — OXYCODONE HYDROCHLORIDE 5 MILLIGRAM(S): 5 TABLET ORAL at 14:02

## 2020-02-13 RX ADMIN — CHLORHEXIDINE GLUCONATE 1 APPLICATION(S): 213 SOLUTION TOPICAL at 12:53

## 2020-02-13 RX ADMIN — Medication 650 MILLIGRAM(S): at 21:43

## 2020-02-13 RX ADMIN — OXYCODONE HYDROCHLORIDE 5 MILLIGRAM(S): 5 TABLET ORAL at 15:00

## 2020-02-13 RX ADMIN — OXYCODONE HYDROCHLORIDE 5 MILLIGRAM(S): 5 TABLET ORAL at 06:51

## 2020-02-13 RX ADMIN — OXYCODONE HYDROCHLORIDE 5 MILLIGRAM(S): 5 TABLET ORAL at 07:55

## 2020-02-13 NOTE — PHYSICAL THERAPY INITIAL EVALUATION ADULT - DISCHARGE DISPOSITION, PT EVAL
unable to determine secondary to pt refusing PT evaluation. Pt to be discharged from PT services at this time as per pt's request despite encouragement and education from PT and case management. Case management aware of situation.

## 2020-02-13 NOTE — PHYSICAL THERAPY INITIAL EVALUATION ADULT - PERTINENT HX OF CURRENT PROBLEM, REHAB EVAL
69 y.o. female presenting from oncologist office for hypotension. Pt found to have vaginal bleeding with anemia (hemoglobin 5.9). Pt with endometrial and cervical mass and new Left para-aortic mass.

## 2020-02-13 NOTE — PHYSICAL THERAPY INITIAL EVALUATION ADULT - ADDITIONAL COMMENTS
pt lives alone in first floor apartment, 2 steps to enter. Pt was independent in all functional activities. no prior use of assistive device.     Pt left sitting in bedside chair, IV intact, NAD. PCA present. +call [place].

## 2020-02-13 NOTE — PROGRESS NOTE ADULT - ASSESSMENT
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 Anemia due to blood loss. -likely 2/2 vaginal bleed in the setting of uterine cancer.     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 Anemia due to blood loss. -likely 2/2 vaginal bleed in the setting of uterine cancer.     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 Anemia due to blood loss. -likely 2/2 vaginal bleed in the setting of uterine cancer.     Seen by Palliative  Patient WANTS CANCER TREATMENT   Ir  ablation of bleed 2/10  Hgb 7.5 was stabke but fell again- ONGOING BLEEED - may need in patient hospice  Hospice RN to see patient before HOME  Tmax 101 overnight

## 2020-02-13 NOTE — PROGRESS NOTE ADULT - SUBJECTIVE AND OBJECTIVE BOX
Patient is a 69y old  Female who presents with a chief complaint of Sent from Onc office for hypotension (2020 15:38)      SUBJECTIVE / OVERNIGHT EVENTS:    MEDICATIONS  (STANDING):  chlorhexidine 4% Liquid 1 Application(s) Topical daily  gabapentin 300 milliGRAM(s) Oral two times a day  lactulose Syrup 10 Gram(s) Oral every 6 hours  polyethylene glycol 3350 17 Gram(s) Oral daily  sodium chloride 0.9%. 1000 milliLiter(s) (80 mL/Hr) IV Continuous <Continuous>    MEDICATIONS  (PRN):  acetaminophen   Tablet .. 650 milliGRAM(s) Oral every 6 hours PRN Temp greater or equal to 38C (100.4F), Mild Pain (1 - 3), Moderate Pain (4 - 6)  acetaminophen  IVPB .. 1000 milliGRAM(s) IV Intermittent once PRN Mild Pain (1 - 3)  bisacodyl 5 milliGRAM(s) Oral every 12 hours PRN Constipation  ondansetron Injectable 4 milliGRAM(s) IV Push every 8 hours PRN Nausea and/or Vomiting  oxyCODONE    IR 5 milliGRAM(s) Oral every 3 hours PRN Severe Pain (7 - 10)        CAPILLARY BLOOD GLUCOSE        I&O's Summary    2020 07:01  -  2020 07:00  --------------------------------------------------------  IN: 300 mL / OUT: 0 mL / NET: 300 mL      Vital Signs Last 24 Hrs  T(C): 37.5 (2020 06:50), Max: 38.3 (2020 21:15)  T(F): 99.5 (2020 06:50), Max: 101 (2020 21:15)  HR: 103 (2020 06:50) (103 - 112)  BP: 149/91 (2020 06:50) (108/70 - 149/91)  BP(mean): --  RR: 18 (2020 06:50) (18 - 20)  SpO2: 100% (2020 06:50) (99% - 100%)  PHYSICAL EXAM:  GENERAL: NAD, well-developed  HEAD:  Atraumatic, Normocephalic  EYES: EOMI, PERRLA, conjunctiva and sclera clear  NECK: Supple, No JVD  CHEST/LUNG: Clear to auscultation bilaterally; No wheeze  R chest port  HEART: Regular rate and rhythm; No murmurs, rubs, or gallops  ABDOMEN: Soft, Nontender, Nondistended; Bowel sounds present  EXTREMITIES:  2+ Peripheral Pulses, No clubbing, cyanosis, or edema  PSYCH: AAOx3  NEUROLOGY: non-focal  SKIN: No rashes or lesions    LABS:                        8.1    7.68  )-----------( 116      ( 2020 07:09 )             24.8     02-13    140  |  108<H>  |  12  ----------------------------<  106<H>  3.5   |  20<L>  |  1.39<H>    Ca    8.7      2020 07:09  Phos  2.5     02-13  Mg     1.6     02-13            Urinalysis Basic - ( 2020 01:00 )    Color: YELLOW / Appearance: Lt TURBID / S.009 / pH: 6.0  Gluc: NEGATIVE / Ketone: NEGATIVE  / Bili: NEGATIVE / Urobili: NORMAL   Blood: LARGE / Protein: 100 / Nitrite: NEGATIVE   Leuk Esterase: LARGE / RBC: 6-10 / WBC >50   Sq Epi: OCC / Non Sq Epi: x / Bacteria: FEW        RADIOLOGY & ADDITIONAL TESTS:    Imaging Personally Reviewed:    Consultant(s) Notes Reviewed:      Care Discussed with Consultants/Other Providers: Patient is a 69y old  Female who presents with a chief complaint of Sent from Onc office for hypotension (2020 15:38)      SUBJECTIVE / OVERNIGHT EVENTS:  Needed blood overnight and fever overnight    MEDICATIONS  (STANDING):  chlorhexidine 4% Liquid 1 Application(s) Topical daily  gabapentin 300 milliGRAM(s) Oral two times a day  lactulose Syrup 10 Gram(s) Oral every 6 hours  polyethylene glycol 3350 17 Gram(s) Oral daily  sodium chloride 0.9%. 1000 milliLiter(s) (80 mL/Hr) IV Continuous <Continuous>    MEDICATIONS  (PRN):  acetaminophen   Tablet .. 650 milliGRAM(s) Oral every 6 hours PRN Temp greater or equal to 38C (100.4F), Mild Pain (1 - 3), Moderate Pain (4 - 6)  acetaminophen  IVPB .. 1000 milliGRAM(s) IV Intermittent once PRN Mild Pain (1 - 3)  bisacodyl 5 milliGRAM(s) Oral every 12 hours PRN Constipation  ondansetron Injectable 4 milliGRAM(s) IV Push every 8 hours PRN Nausea and/or Vomiting  oxyCODONE    IR 5 milliGRAM(s) Oral every 3 hours PRN Severe Pain (7 - 10)      I&O's Summary    2020 07:01  -  2020 07:00  --------------------------------------------------------  IN: 300 mL / OUT: 0 mL / NET: 300 mL      Vital Signs Last 24 Hrs  T(C): 37.5 (2020 06:50), Max: 38.3 (2020 21:15)  T(F): 99.5 (2020 06:50), Max: 101 (2020 21:15)  HR: 103 (2020 06:50) (103 - 112)  BP: 149/91 (2020 06:50) (108/70 - 149/91)  BP(mean): --  RR: 18 (2020 06:50) (18 - 20)  SpO2: 100% (2020 06:50) (99% - 100%)  PHYSICAL EXAM:  GENERAL: NAD, well-developed  HEAD:  Atraumatic, Normocephalic  EYES: EOMI, PERRLA, conjunctiva and sclera clear  NECK: Supple, No JVD  CHEST/LUNG: Clear to auscultation bilaterally; No wheeze  R chest port  HEART: Regular rate and rhythm; No murmurs, rubs, or gallops  ABDOMEN: Soft, Nontender, Nondistended; Bowel sounds present  EXTREMITIES:  2+ Peripheral Pulses, No clubbing, cyanosis, or edema  PSYCH: AAOx3  NEUROLOGY: non-focal  SKIN: No rashes or lesions    LABS:                        8.1    7.68  )-----------( 116      ( 2020 07:09 )             24.8     02-13    140  |  108<H>  |  12  ----------------------------<  106<H>  3.5   |  20<L>  |  1.39<H>    Ca    8.7      2020 07:09  Phos  2.5     02-13  Mg     1.6     02-13            Urinalysis Basic - ( 2020 01:00 )    Color: YELLOW / Appearance: Lt TURBID / S.009 / pH: 6.0  Gluc: NEGATIVE / Ketone: NEGATIVE  / Bili: NEGATIVE / Urobili: NORMAL   Blood: LARGE / Protein: 100 / Nitrite: NEGATIVE   Leuk Esterase: LARGE / RBC: 6-10 / WBC >50   Sq Epi: OCC / Non Sq Epi: x / Bacteria: FEW        RADIOLOGY & ADDITIONAL TESTS:    Imaging Personally Reviewed:    Consultant(s) Notes Reviewed:      Care Discussed with Consultants/Other Providers:

## 2020-02-13 NOTE — PROGRESS NOTE ADULT - ATTENDING COMMENTS
Ready for home with home   Will do out patient f/u with Dr KATHRINE Garcia- Oncology and with DR Lazar for pain control  Vaginal bleeding has stopped.    50 min to coordinate d/c Ready for home with home   Will do out patient f/u with Dr KATHRINE Garcia- Oncology and with DR Lazar for pain control  Vaginal bleeding has stopped.  fever- cxr/ blood/ua start Levaquin   d/c on hold until cultures returned    50 min to coordinate d/c Ready for home with home   Will do out patient f/u with Dr KATHRINE Garcia- Oncology and with DR Lazar for pain control  Vaginal bleeding has stopped.  fever- cxr/ blood/ua start IV Levaquin  d/c on hold until cultures returned    50 min to coordinate d/c Ready for home with home   Will do out patient f/u with Dr KATHRINE Garcia- Oncology and with DR Lazar for pain control  Vaginal bleeding has stopped.  fever- cxr/ blood/ua start IV Levaquin  d/c on hold until cultures returned    50 min to coordinate d/c  Addendum  Will [facility] [place] - hospita/ hos[

## 2020-02-13 NOTE — PHYSICAL THERAPY INITIAL EVALUATION ADULT - ASR EQUIP NEEDS DISCH PT EVAL
rolling walker (5 inch wheels)/Recommend rolling walker for improved stability during ambulation; however, pt reports she does not need assistive device.

## 2020-02-14 LAB
ANION GAP SERPL CALC-SCNC: 12 MMO/L — SIGNIFICANT CHANGE UP (ref 7–14)
BLD GP AB SCN SERPL QL: NEGATIVE — SIGNIFICANT CHANGE UP
BUN SERPL-MCNC: 11 MG/DL — SIGNIFICANT CHANGE UP (ref 7–23)
CALCIUM SERPL-MCNC: 8.5 MG/DL — SIGNIFICANT CHANGE UP (ref 8.4–10.5)
CHLORIDE SERPL-SCNC: 107 MMOL/L — SIGNIFICANT CHANGE UP (ref 98–107)
CO2 SERPL-SCNC: 20 MMOL/L — LOW (ref 22–31)
CREAT SERPL-MCNC: 1.41 MG/DL — HIGH (ref 0.5–1.3)
GLUCOSE SERPL-MCNC: 79 MG/DL — SIGNIFICANT CHANGE UP (ref 70–99)
METHOD TYPE: SIGNIFICANT CHANGE UP
ORGANISM # SPEC MICROSCOPIC CNT: SIGNIFICANT CHANGE UP
ORGANISM # SPEC MICROSCOPIC CNT: SIGNIFICANT CHANGE UP
POTASSIUM SERPL-MCNC: 3.8 MMOL/L — SIGNIFICANT CHANGE UP (ref 3.5–5.3)
POTASSIUM SERPL-SCNC: 3.8 MMOL/L — SIGNIFICANT CHANGE UP (ref 3.5–5.3)
RH IG SCN BLD-IMP: POSITIVE — SIGNIFICANT CHANGE UP
SODIUM SERPL-SCNC: 139 MMOL/L — SIGNIFICANT CHANGE UP (ref 135–145)
SPECIMEN SOURCE: SIGNIFICANT CHANGE UP

## 2020-02-14 PROCEDURE — 99233 SBSQ HOSP IP/OBS HIGH 50: CPT

## 2020-02-14 RX ORDER — PIPERACILLIN AND TAZOBACTAM 4; .5 G/20ML; G/20ML
3.38 INJECTION, POWDER, LYOPHILIZED, FOR SOLUTION INTRAVENOUS ONCE
Refills: 0 | Status: COMPLETED | OUTPATIENT
Start: 2020-02-14 | End: 2020-02-14

## 2020-02-14 RX ORDER — PIPERACILLIN AND TAZOBACTAM 4; .5 G/20ML; G/20ML
3.38 INJECTION, POWDER, LYOPHILIZED, FOR SOLUTION INTRAVENOUS EVERY 8 HOURS
Refills: 0 | Status: DISCONTINUED | OUTPATIENT
Start: 2020-02-14 | End: 2020-02-20

## 2020-02-14 RX ADMIN — OXYCODONE HYDROCHLORIDE 5 MILLIGRAM(S): 5 TABLET ORAL at 23:35

## 2020-02-14 RX ADMIN — OXYCODONE HYDROCHLORIDE 5 MILLIGRAM(S): 5 TABLET ORAL at 01:44

## 2020-02-14 RX ADMIN — OXYCODONE HYDROCHLORIDE 5 MILLIGRAM(S): 5 TABLET ORAL at 02:30

## 2020-02-14 RX ADMIN — LACTULOSE 10 GRAM(S): 10 SOLUTION ORAL at 06:28

## 2020-02-14 RX ADMIN — CHLORHEXIDINE GLUCONATE 1 APPLICATION(S): 213 SOLUTION TOPICAL at 14:50

## 2020-02-14 RX ADMIN — PIPERACILLIN AND TAZOBACTAM 25 GRAM(S): 4; .5 INJECTION, POWDER, LYOPHILIZED, FOR SOLUTION INTRAVENOUS at 23:00

## 2020-02-14 RX ADMIN — OXYCODONE HYDROCHLORIDE 5 MILLIGRAM(S): 5 TABLET ORAL at 13:13

## 2020-02-14 RX ADMIN — PIPERACILLIN AND TAZOBACTAM 200 GRAM(S): 4; .5 INJECTION, POWDER, LYOPHILIZED, FOR SOLUTION INTRAVENOUS at 14:50

## 2020-02-14 RX ADMIN — OXYCODONE HYDROCHLORIDE 5 MILLIGRAM(S): 5 TABLET ORAL at 14:00

## 2020-02-14 NOTE — PROVIDER CONTACT NOTE (OTHER) - RECOMMENDATIONS
NISHA Vyas
rechecked blood pressure 113/71
Continue with blood transfusion and repeat CBC post transfusion.
repeat cathflow

## 2020-02-14 NOTE — CHART NOTE - NSCHARTNOTEFT_GEN_A_CORE
Discussed positive blood culture for gram variable rods with hospitalist. Patient currently on IV Levaquin. Recommend ID consult for further management.

## 2020-02-14 NOTE — PROGRESS NOTE ADULT - SUBJECTIVE AND OBJECTIVE BOX
Patient is a 69y old  Female who presents with a chief complaint of Sent from Onc office for hypotension (2020 09:42)      SUBJECTIVE / OVERNIGHT EVENTS:  patient seen with son at bedside    MEDICATIONS  (STANDING):  chlorhexidine 4% Liquid 1 Application(s) Topical daily  gabapentin 300 milliGRAM(s) Oral two times a day  lactulose Syrup 10 Gram(s) Oral every 6 hours  levoFLOXacin IVPB 250 milliGRAM(s) IV Intermittent every 24 hours  levoFLOXacin IVPB      polyethylene glycol 3350 17 Gram(s) Oral daily  sodium chloride 0.9%. 1000 milliLiter(s) (80 mL/Hr) IV Continuous <Continuous>    MEDICATIONS  (PRN):  acetaminophen   Tablet .. 650 milliGRAM(s) Oral every 6 hours PRN Temp greater or equal to 38C (100.4F), Mild Pain (1 - 3), Moderate Pain (4 - 6)  acetaminophen  IVPB .. 1000 milliGRAM(s) IV Intermittent once PRN Mild Pain (1 - 3)  bisacodyl 5 milliGRAM(s) Oral every 12 hours PRN Constipation  ondansetron Injectable 4 milliGRAM(s) IV Push every 8 hours PRN Nausea and/or Vomiting  oxyCODONE    IR 5 milliGRAM(s) Oral every 3 hours PRN Severe Pain (7 - 10)      Vital Signs Last 24 Hrs  T(C): 37 (2020 06:25), Max: 38.7 (2020 21:25)  T(F): 98.6 (2020 06:25), Max: 101.6 (2020 21:25)  HR: 101 (2020 06:25) (97 - 105)  BP: 137/87 (2020 06:25) (125/71 - 143/78)  BP(mean): --  RR: 18 (2020 06:25) (18 - 19)  SpO2: 98% (2020 06:25) (98% - 100%)    PHYSICAL EXAM:  GENERAL: NAD, well-developed  HEAD:  Atraumatic, Normocephalic  EYES: EOMI, PERRLA, conjunctiva and sclera clear  NECK: Supple, No JVD  CHEST/LUNG: Clear to auscultation bilaterally; No wheeze  R chest port  HEART: Regular rate and rhythm; No murmurs, rubs, or gallops  ABDOMEN: Soft, Nontender, Nondistended; Bowel sounds present  EXTREMITIES:  2+ Peripheral Pulses, No clubbing, cyanosis, or edema  PSYCH: AAOx3  NEUROLOGY: non-focal  SKIN: No rashes or lesions    LABS:                        8.0    7.78  )-----------( 108      ( 2020 18:00 )             24.0     02-14    139  |  107  |  11  ----------------------------<  79  3.8   |  20<L>  |  1.41<H>    Ca    8.5      2020 06:15  Phos  2.5     02-13  Mg     1.6     02-13            Urinalysis Basic - ( 2020 01:00 )    Color: YELLOW / Appearance: Lt TURBID / S.009 / pH: 6.0  Gluc: NEGATIVE / Ketone: NEGATIVE  / Bili: NEGATIVE / Urobili: NORMAL   Blood: LARGE / Protein: 100 / Nitrite: NEGATIVE   Leuk Esterase: LARGE / RBC: 6-10 / WBC >50   Sq Epi: OCC / Non Sq Epi: x / Bacteria: FEW        RADIOLOGY & ADDITIONAL TESTS:    Imaging Personally Reviewed:    Consultant(s) Notes Reviewed:      Care Discussed with Consultants/Other Providers:

## 2020-02-14 NOTE — PROGRESS NOTE ADULT - ATTENDING COMMENTS
? [facility] with transition to Hospice  Son at bedside understands- all questions answered.    45 min to coordinate d/c [facility] with transition to Hospice  Son at bedside understands- all questions answered.      Addendum  positive blood and urine cultures  Urine-  E fecalis- start Iv Zosyn.  need f/u blood cultures befre stopping    45 min to coordinate d/c

## 2020-02-14 NOTE — PROGRESS NOTE ADULT - ASSESSMENT
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 Anemia due to blood loss. -likely 2/2 vaginal bleed in the setting of uterine cancer.     Seen by Palliative  Patient WANTS CANCER TREATMENT   Ir  ablation of bleed 2/10  Hgb 7.5 was stabke but fell again- ONGOING BLEEED - may need in patient hospice  Hospice RN to see patient before HOME  Tmax 101 overnight 2/13- now on antibiotics

## 2020-02-14 NOTE — PROVIDER CONTACT NOTE (CRITICAL VALUE NOTIFICATION) - ACTION/TREATMENT ORDERED:
NP aware.
NP Margarita Ya notified, awaiting further orders at this time.
P.A. made aware.
no orders at this time.
1 unit of PRBC ordered   Provider aware, continue to monitor
BRIAN. made aware awaiting order.
Covering ACP DAMI Mauricio with orders for repeat CBC
1 unit packed red cells
Continue to monitor estefany pads. Upon return from RT, transfuse 1 unit PRBC.
Transfuse 1 unit PRBCs

## 2020-02-15 LAB
ORGANISM # SPEC MICROSCOPIC CNT: SIGNIFICANT CHANGE UP
SPECIMEN SOURCE: SIGNIFICANT CHANGE UP

## 2020-02-15 PROCEDURE — 99232 SBSQ HOSP IP/OBS MODERATE 35: CPT

## 2020-02-15 RX ADMIN — PIPERACILLIN AND TAZOBACTAM 25 GRAM(S): 4; .5 INJECTION, POWDER, LYOPHILIZED, FOR SOLUTION INTRAVENOUS at 14:27

## 2020-02-15 RX ADMIN — OXYCODONE HYDROCHLORIDE 5 MILLIGRAM(S): 5 TABLET ORAL at 11:45

## 2020-02-15 RX ADMIN — OXYCODONE HYDROCHLORIDE 5 MILLIGRAM(S): 5 TABLET ORAL at 18:10

## 2020-02-15 RX ADMIN — OXYCODONE HYDROCHLORIDE 5 MILLIGRAM(S): 5 TABLET ORAL at 10:57

## 2020-02-15 RX ADMIN — SODIUM CHLORIDE 80 MILLILITER(S): 9 INJECTION INTRAMUSCULAR; INTRAVENOUS; SUBCUTANEOUS at 03:00

## 2020-02-15 RX ADMIN — PIPERACILLIN AND TAZOBACTAM 25 GRAM(S): 4; .5 INJECTION, POWDER, LYOPHILIZED, FOR SOLUTION INTRAVENOUS at 22:09

## 2020-02-15 RX ADMIN — OXYCODONE HYDROCHLORIDE 5 MILLIGRAM(S): 5 TABLET ORAL at 22:28

## 2020-02-15 RX ADMIN — CHLORHEXIDINE GLUCONATE 1 APPLICATION(S): 213 SOLUTION TOPICAL at 11:01

## 2020-02-15 RX ADMIN — OXYCODONE HYDROCHLORIDE 5 MILLIGRAM(S): 5 TABLET ORAL at 00:05

## 2020-02-15 RX ADMIN — OXYCODONE HYDROCHLORIDE 5 MILLIGRAM(S): 5 TABLET ORAL at 17:13

## 2020-02-15 RX ADMIN — OXYCODONE HYDROCHLORIDE 5 MILLIGRAM(S): 5 TABLET ORAL at 22:58

## 2020-02-15 RX ADMIN — Medication 650 MILLIGRAM(S): at 02:20

## 2020-02-15 RX ADMIN — Medication 650 MILLIGRAM(S): at 02:50

## 2020-02-15 RX ADMIN — PIPERACILLIN AND TAZOBACTAM 25 GRAM(S): 4; .5 INJECTION, POWDER, LYOPHILIZED, FOR SOLUTION INTRAVENOUS at 05:28

## 2020-02-15 NOTE — PROGRESS NOTE ADULT - SUBJECTIVE AND OBJECTIVE BOX
[facility] Division of Hospital Medicine  Mini Coughlin MD  Pager [phone]    Patient is a 69y old  Female who presents with a chief complaint of Sent from Onc office for hypotension (14 Feb 2020 09:42)      SUBJECTIVE / OVERNIGHT EVENTS: pt seen at 1p- was eating lunch- still having fevers  some pain in her abdomen  ADDITIONAL REVIEW OF SYSTEMS:    MEDICATIONS  (STANDING):  chlorhexidine 4% Liquid 1 Application(s) Topical daily  gabapentin 300 milliGRAM(s) Oral two times a day  lactulose Syrup 10 Gram(s) Oral every 6 hours  piperacillin/tazobactam IVPB.. 3.375 Gram(s) IV Intermittent every 8 hours  polyethylene glycol 3350 17 Gram(s) Oral daily  sodium chloride 0.9%. 1000 milliLiter(s) (80 mL/Hr) IV Continuous <Continuous>    MEDICATIONS  (PRN):  acetaminophen   Tablet .. 650 milliGRAM(s) Oral every 6 hours PRN Temp greater or equal to 38C (100.4F), Mild Pain (1 - 3), Moderate Pain (4 - 6)  acetaminophen  IVPB .. 1000 milliGRAM(s) IV Intermittent once PRN Mild Pain (1 - 3)  bisacodyl 5 milliGRAM(s) Oral every 12 hours PRN Constipation  ondansetron Injectable 4 milliGRAM(s) IV Push every 8 hours PRN Nausea and/or Vomiting  oxyCODONE    IR 5 milliGRAM(s) Oral every 3 hours PRN Severe Pain (7 - 10)      CAPILLARY BLOOD GLUCOSE        I&O's Summary      PHYSICAL EXAM:  Vital Signs Last 24 Hrs  T(C): 36.9 (15 Feb 2020 13:05), Max: 38.4 (15 Feb 2020 01:52)  T(F): 98.4 (15 Feb 2020 13:05), Max: 101.2 (15 Feb 2020 01:52)  HR: 92 (15 Feb 2020 13:05) (87 - 105)  BP: 129/74 (15 Feb 2020 13:05) (114/67 - 129/74)  BP(mean): --  RR: 16 (15 Feb 2020 05:26) (16 - 20)  SpO2: 100% (15 Feb 2020 13:05) (95% - 100%)  CONSTITUTIONAL: NAD  RESPIRATORY: Normal respiratory effort; lungs are clear to auscultation bilaterally  CARDIOVASCULAR: Regular rate and rhythm, normal S1 and S2, no murmur/rub/gallop; No lower extremity edema;   ABDOMEN: Nontender to palpation, normoactive bowel sounds, not distended;   MUSCULOSKELETAL:  Normal gait; no clubbing or cyanosis of digits; no joint swelling or tenderness to palpation  PSYCH: A+O to person, place, and time; affect appropriate      LABS:                        8.0    7.78  )-----------( 108      ( 13 Feb 2020 18:00 )             24.0     02-14    139  |  107  |  11  ----------------------------<  79  3.8   |  20<L>  |  1.41<H>    Ca    8.5      14 Feb 2020 06:15                Culture - Blood (collected 14 Feb 2020 07:32)  Source: BLOOD PERIPHERAL  Preliminary Report (15 Feb 2020 07:31):    NO ORGANISMS ISOLATED    NO ORGANISMS ISOLATED AT 24 HOURS    Culture - Urine (collected 13 Feb 2020 07:41)  Source: URINE MIDSTREAM  Preliminary Report (15 Feb 2020 11:56):    CULTURE IN PROGRESS, FURTHER REPORT TO FOLLOW.    ENTF^Enterococcus faecalis    COLONY COUNT: 10,000-49,000 CFU/mL  Preliminary Report (14 Feb 2020 12:15):    ENTF^Enterococcus faecalis    COLONY COUNT: 10,000-49,000 CFU/mL    Culture - Blood (collected 12 Feb 2020 22:16)  Source: BLOOD VENOUS  Preliminary Report (15 Feb 2020 09:31):    CULTURE IN PROGRESS, FURTHER REPORT TO FOLLOW.  Preliminary Report (14 Feb 2020 03:38):    ***Blood Panel PCR results on this specimen are available    approximately 3 hours after the Gram stain result***    Gram stain, PCR, and/or culture results may not always    correspond due to difference in methodologies    ------------------------------------------------------------    This PCR assay was performed using [employer].  The    following targets are tested for:  Enterococcus, vancomycin    resistant enterococci, Listeria monocytogenes,  coagulase    negative staphylococci, S. aureus, methicillin resistant S.    aureus, Streptococcus agalactiae (Group B), S. pneumoniae,    S. pyogenes (Group A), Acinetobacter baumannii, Enterobacter    cloacae, E. coli, Klebsiella oxytoca, K. pneumoniae, Proteus    sp., Serratia marcescens, Haemophilus influenzae, Neisseria    meningitidis, Pseudomonas aeruginosa, Candida albicans, C.    glabrata, C. krusei, C. parapsilosis, C. tropicalis and the    KPC resistance gene.    **NOTE: Due to technical problems, Proteus sp. will NOT be    reported as part of the BCID paneluntil further notice.  Organism: BLOOD CULTURE PCR  ***Blood Panel PCR results on this specimen are available  approximately 3 hours after the Gram stain result***  Gram stain, PCR, and/or culture results may not always  correspond due to difference in methodologies  ------------------------------------------------------------  This PCR assay was performed using [employer].  The  following targets are tested for:  Enterococcus, vancomycin  resistant enterococci, Listeria monocytogenes,  coagulase  negative staphylococci, S. aureus, methicillin resistant S.  aureus, Streptococcus agalactiae (Group B), S. pneumoniae,  S. pyogenes (Group A), Acinetobacter baumannii, Enterobacter  cloacae, E. coli, Klebsiella oxytoca, K. pneumoniae, Proteus  sp., Serratia marcescens, Haemophilus influenzae, Neisseria  meningitidis, Pseudomonas aeruginosa, Candida albicans, C.  glabrata, C. krusei, C. parapsilosis, C. tropicalis and the  KPC resistance gene.  **NOTE: Due to technical problems, Proteus sp. will NOT be  reported as part of the BCID panel until further notice. (15 Feb 2020 09:31)  Organism: BLOOD CULTURE PCR (14 Feb 2020 03:38)    Culture - Blood (collected 12 Feb 2020 22:16)  Source: BLOOD PERIPHERAL  Preliminary Report (14 Feb 2020 22:15):    NO ORGANISMS ISOLATED    NO ORGANISMS ISOLATED AT 48 HRS.        RADIOLOGY & ADDITIONAL TESTS:  Results Reviewed:   Imaging Personally Reviewed:  Electrocardiogram Personally Reviewed:    COORDINATION OF CARE:  Care Discussed with Consultants/Other Providers [Y/N]:  Prior or Outpatient Records Reviewed [Y/N]:

## 2020-02-15 NOTE — PROGRESS NOTE ADULT - ASSESSMENT
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 Anemia due to blood loss. -likely 2/2 vaginal bleed in the setting of uterine cancer. s/p ablation by IR- now for [place]- pt continues to spike  Seen by Palliative

## 2020-02-16 LAB
-  AMPICILLIN: SIGNIFICANT CHANGE UP
-  CANDIDA ALBICANS: SIGNIFICANT CHANGE UP
-  CANDIDA GLABRATA: SIGNIFICANT CHANGE UP
-  CANDIDA KRUSEI: SIGNIFICANT CHANGE UP
-  CANDIDA PARAPSILOSIS: SIGNIFICANT CHANGE UP
-  CANDIDA TROPICALIS: SIGNIFICANT CHANGE UP
-  CIPROFLOXACIN: SIGNIFICANT CHANGE UP
-  COAGULASE NEGATIVE STAPHYLOCOCCUS: SIGNIFICANT CHANGE UP
-  K. PNEUMONIAE GROUP: SIGNIFICANT CHANGE UP
-  KPC RESISTANCE GENE: SIGNIFICANT CHANGE UP
-  NITROFURANTOIN: SIGNIFICANT CHANGE UP
-  STREPTOCOCCUS SP. (NOT GRP A, B OR S PNEUMONIAE): SIGNIFICANT CHANGE UP
-  TETRACYCLINE: SIGNIFICANT CHANGE UP
-  VANCOMYCIN: SIGNIFICANT CHANGE UP
A BAUMANNII DNA SPEC QL NAA+PROBE: SIGNIFICANT CHANGE UP
ANION GAP SERPL CALC-SCNC: 13 MMO/L — SIGNIFICANT CHANGE UP (ref 7–14)
BACTERIA BLD CULT: SIGNIFICANT CHANGE UP
BACTERIA UR CULT: SIGNIFICANT CHANGE UP
BASOPHILS # BLD AUTO: 0.01 K/UL — SIGNIFICANT CHANGE UP (ref 0–0.2)
BASOPHILS NFR BLD AUTO: 0.2 % — SIGNIFICANT CHANGE UP (ref 0–2)
BLD GP AB SCN SERPL QL: NEGATIVE — SIGNIFICANT CHANGE UP
BUN SERPL-MCNC: 8 MG/DL — SIGNIFICANT CHANGE UP (ref 7–23)
CALCIUM SERPL-MCNC: 8.3 MG/DL — LOW (ref 8.4–10.5)
CHLORIDE SERPL-SCNC: 104 MMOL/L — SIGNIFICANT CHANGE UP (ref 98–107)
CO2 SERPL-SCNC: 22 MMOL/L — SIGNIFICANT CHANGE UP (ref 22–31)
CREAT SERPL-MCNC: 1.43 MG/DL — HIGH (ref 0.5–1.3)
E CLOAC COMP DNA BLD POS QL NAA+PROBE: SIGNIFICANT CHANGE UP
E COLI DNA BLD POS QL NAA+NON-PROBE: SIGNIFICANT CHANGE UP
ENTEROCOC DNA BLD POS QL NAA+NON-PROBE: SIGNIFICANT CHANGE UP
ENTEROCOC DNA BLD POS QL NAA+NON-PROBE: SIGNIFICANT CHANGE UP
EOSINOPHIL # BLD AUTO: 0.17 K/UL — SIGNIFICANT CHANGE UP (ref 0–0.5)
EOSINOPHIL NFR BLD AUTO: 3.5 % — SIGNIFICANT CHANGE UP (ref 0–6)
GLUCOSE SERPL-MCNC: 114 MG/DL — HIGH (ref 70–99)
GP B STREP DNA BLD POS QL NAA+NON-PROBE: SIGNIFICANT CHANGE UP
HAEM INFLU DNA BLD POS QL NAA+NON-PROBE: SIGNIFICANT CHANGE UP
HCT VFR BLD CALC: 22.3 % — LOW (ref 34.5–45)
HGB BLD-MCNC: 7.1 G/DL — LOW (ref 11.5–15.5)
IMM GRANULOCYTES NFR BLD AUTO: 0.8 % — SIGNIFICANT CHANGE UP (ref 0–1.5)
K OXYTOCA DNA BLD POS QL NAA+NON-PROBE: SIGNIFICANT CHANGE UP
L MONOCYTOG DNA BLD POS QL NAA+NON-PROBE: SIGNIFICANT CHANGE UP
LYMPHOCYTES # BLD AUTO: 0.82 K/UL — LOW (ref 1–3.3)
LYMPHOCYTES # BLD AUTO: 17 % — SIGNIFICANT CHANGE UP (ref 13–44)
MAGNESIUM SERPL-MCNC: 1.3 MG/DL — LOW (ref 1.6–2.6)
MCHC RBC-ENTMCNC: 28.6 PG — SIGNIFICANT CHANGE UP (ref 27–34)
MCHC RBC-ENTMCNC: 31.8 % — LOW (ref 32–36)
MCV RBC AUTO: 89.9 FL — SIGNIFICANT CHANGE UP (ref 80–100)
METHOD TYPE: SIGNIFICANT CHANGE UP
MONOCYTES # BLD AUTO: 0.5 K/UL — SIGNIFICANT CHANGE UP (ref 0–0.9)
MONOCYTES NFR BLD AUTO: 10.4 % — SIGNIFICANT CHANGE UP (ref 2–14)
MRSA SPEC QL CULT: SIGNIFICANT CHANGE UP
MSSA DNA SPEC QL NAA+PROBE: SIGNIFICANT CHANGE UP
N MEN ISLT CULT: SIGNIFICANT CHANGE UP
NEUTROPHILS # BLD AUTO: 3.29 K/UL — SIGNIFICANT CHANGE UP (ref 1.8–7.4)
NEUTROPHILS NFR BLD AUTO: 68.1 % — SIGNIFICANT CHANGE UP (ref 43–77)
NRBC # FLD: 0 K/UL — SIGNIFICANT CHANGE UP (ref 0–0)
ORGANISM # SPEC MICROSCOPIC CNT: SIGNIFICANT CHANGE UP
P AERUGINOSA DNA BLD POS NAA+NON-PROBE: SIGNIFICANT CHANGE UP
PHOSPHATE SERPL-MCNC: 2.1 MG/DL — LOW (ref 2.5–4.5)
PLATELET # BLD AUTO: 93 K/UL — LOW (ref 150–400)
PMV BLD: 8.4 FL — SIGNIFICANT CHANGE UP (ref 7–13)
POTASSIUM SERPL-MCNC: 3.1 MMOL/L — LOW (ref 3.5–5.3)
POTASSIUM SERPL-SCNC: 3.1 MMOL/L — LOW (ref 3.5–5.3)
RBC # BLD: 2.48 M/UL — LOW (ref 3.8–5.2)
RBC # FLD: 16.1 % — HIGH (ref 10.3–14.5)
RH IG SCN BLD-IMP: POSITIVE — SIGNIFICANT CHANGE UP
S MARCESCENS DNA BLD POS NAA+NON-PROBE: SIGNIFICANT CHANGE UP
S PNEUM DNA BLD POS QL NAA+NON-PROBE: SIGNIFICANT CHANGE UP
S PYO DNA BLD POS QL NAA+NON-PROBE: SIGNIFICANT CHANGE UP
SODIUM SERPL-SCNC: 139 MMOL/L — SIGNIFICANT CHANGE UP (ref 135–145)
SPECIMEN SOURCE: SIGNIFICANT CHANGE UP
SPECIMEN SOURCE: SIGNIFICANT CHANGE UP
WBC # BLD: 4.83 K/UL — SIGNIFICANT CHANGE UP (ref 3.8–10.5)
WBC # FLD AUTO: 4.83 K/UL — SIGNIFICANT CHANGE UP (ref 3.8–10.5)

## 2020-02-16 PROCEDURE — 99232 SBSQ HOSP IP/OBS MODERATE 35: CPT

## 2020-02-16 RX ORDER — MAGNESIUM SULFATE 500 MG/ML
1 VIAL (ML) INJECTION ONCE
Refills: 0 | Status: COMPLETED | OUTPATIENT
Start: 2020-02-16 | End: 2020-02-16

## 2020-02-16 RX ORDER — POTASSIUM CHLORIDE 20 MEQ
40 PACKET (EA) ORAL ONCE
Refills: 0 | Status: COMPLETED | OUTPATIENT
Start: 2020-02-16 | End: 2020-02-16

## 2020-02-16 RX ADMIN — PIPERACILLIN AND TAZOBACTAM 25 GRAM(S): 4; .5 INJECTION, POWDER, LYOPHILIZED, FOR SOLUTION INTRAVENOUS at 15:46

## 2020-02-16 RX ADMIN — Medication 650 MILLIGRAM(S): at 18:10

## 2020-02-16 RX ADMIN — Medication 650 MILLIGRAM(S): at 18:07

## 2020-02-16 RX ADMIN — CHLORHEXIDINE GLUCONATE 1 APPLICATION(S): 213 SOLUTION TOPICAL at 11:55

## 2020-02-16 RX ADMIN — OXYCODONE HYDROCHLORIDE 5 MILLIGRAM(S): 5 TABLET ORAL at 15:45

## 2020-02-16 RX ADMIN — PIPERACILLIN AND TAZOBACTAM 25 GRAM(S): 4; .5 INJECTION, POWDER, LYOPHILIZED, FOR SOLUTION INTRAVENOUS at 06:16

## 2020-02-16 RX ADMIN — OXYCODONE HYDROCHLORIDE 5 MILLIGRAM(S): 5 TABLET ORAL at 22:50

## 2020-02-16 RX ADMIN — Medication 40 MILLIEQUIVALENT(S): at 15:47

## 2020-02-16 RX ADMIN — Medication 100 GRAM(S): at 15:47

## 2020-02-16 RX ADMIN — OXYCODONE HYDROCHLORIDE 5 MILLIGRAM(S): 5 TABLET ORAL at 06:46

## 2020-02-16 RX ADMIN — OXYCODONE HYDROCHLORIDE 5 MILLIGRAM(S): 5 TABLET ORAL at 23:20

## 2020-02-16 RX ADMIN — PIPERACILLIN AND TAZOBACTAM 25 GRAM(S): 4; .5 INJECTION, POWDER, LYOPHILIZED, FOR SOLUTION INTRAVENOUS at 22:39

## 2020-02-16 RX ADMIN — SODIUM CHLORIDE 80 MILLILITER(S): 9 INJECTION INTRAMUSCULAR; INTRAVENOUS; SUBCUTANEOUS at 08:00

## 2020-02-16 RX ADMIN — OXYCODONE HYDROCHLORIDE 5 MILLIGRAM(S): 5 TABLET ORAL at 16:20

## 2020-02-16 RX ADMIN — OXYCODONE HYDROCHLORIDE 5 MILLIGRAM(S): 5 TABLET ORAL at 06:16

## 2020-02-16 NOTE — PROVIDER CONTACT NOTE (OTHER) - BACKGROUND
patient here for anemia/UTI
Patient has uterine cancer, with active vaginal bleeding.
Patient ordered for CBC q12hrs to monitor for anemia
anemia, vaginal bleeding, uterine carcinosarcoma, HTN

## 2020-02-16 NOTE — PROVIDER CONTACT NOTE (OTHER) - ASSESSMENT
Pt warm to the touch. C/o "feeling chills".
no distress noted
Vital signs otherwise stable  denies chest pain and shortness of breath
blood pressure 97/58, patient Asymptomatic , patient in no acute distress
no distress noted
no distress noted
Patient received one dose cathflow at 2000. After one hour, attempted for blood return. Unsuccesfull.
Patient went to use restroom, patient completely saturated her depends and had a large amount of blood in the toilet. Patient was symptomatic, complaining of dizziness and fatigue. Patient vitals are stable and she is resting in bed receiving the blood transfusion. Patient states she feels okay,
pt denies dizziness, pain, sleeping comfortably in bed, VSS

## 2020-02-16 NOTE — PROVIDER CONTACT NOTE (OTHER) - NAME OF MD/NP/PA/DO NOTIFIED:
CHRIS Ya
CHRIS Ya
DAMI Helm
DAMI Huddleston [id number]
Evan Torres [id number]
GRAHAM Ya
Karo
Kenna Root
MD Larry

## 2020-02-16 NOTE — PROVIDER CONTACT NOTE (OTHER) - ACTION/TREATMENT ORDERED:
NP Margarita Ya notified, awaiting further orders at this time.
Give Tylenol 650 mg PO   Provider aware, continue to monitor.
N.P. made aware. Tylenol 650mg given. will continue to monitor
RAMIN.DANGELO. made aware. awaiting order.
RAMIN.P. made aware
MD notified . continue to monitor
do not draw post transfusion CBC
Repeat CBC 3 hours post transfusion.
cathflow

## 2020-02-16 NOTE — PROGRESS NOTE ADULT - SUBJECTIVE AND OBJECTIVE BOX
[facility] Division of Hospital Medicine  Mini Coughlin MD  Pager [phone]    Patient is a 69y old  Female who presents with a chief complaint of Sent from Onc office for hypotension (15 Feb 2020 15:21)      SUBJECTIVE / OVERNIGHT EVENTS: pt seen and examined at 2p- resting in bed- felt woozy and tired.  no change in abd pain- no bleeding in stool or vagina.  did not want CT abd to look for RP bleed today  was tired- reluctantly agreed to blood  ADDITIONAL REVIEW OF SYSTEMS:    MEDICATIONS  (STANDING):  chlorhexidine 4% Liquid 1 Application(s) Topical daily  gabapentin 300 milliGRAM(s) Oral two times a day  piperacillin/tazobactam IVPB.. 3.375 Gram(s) IV Intermittent every 8 hours  polyethylene glycol 3350 17 Gram(s) Oral daily  sodium chloride 0.9%. 1000 milliLiter(s) (80 mL/Hr) IV Continuous <Continuous>    MEDICATIONS  (PRN):  acetaminophen   Tablet .. 650 milliGRAM(s) Oral every 6 hours PRN Temp greater or equal to 38C (100.4F), Mild Pain (1 - 3), Moderate Pain (4 - 6)  acetaminophen  IVPB .. 1000 milliGRAM(s) IV Intermittent once PRN Mild Pain (1 - 3)  bisacodyl 5 milliGRAM(s) Oral every 12 hours PRN Constipation  ondansetron Injectable 4 milliGRAM(s) IV Push every 8 hours PRN Nausea and/or Vomiting  oxyCODONE    IR 5 milliGRAM(s) Oral every 3 hours PRN Severe Pain (7 - 10)      CAPILLARY BLOOD GLUCOSE        I&O's Summary    15 Feb 2020 07:01  -  16 Feb 2020 07:00  --------------------------------------------------------  IN: 0 mL / OUT: 200 mL / NET: -200 mL        PHYSICAL EXAM:  Vital Signs Last 24 Hrs  T(C): 37 (16 Feb 2020 12:08), Max: 37.6 (15 Feb 2020 22:22)  T(F): 98.6 (16 Feb 2020 12:08), Max: 99.6 (15 Feb 2020 22:22)  HR: 84 (16 Feb 2020 12:08) (84 - 96)  BP: 119/82 (16 Feb 2020 12:08) (119/68 - 126/71)  BP(mean): --  RR: 17 (16 Feb 2020 12:08) (17 - 18)  SpO2: 99% (16 Feb 2020 12:08) (99% - 100%)  CONSTITUTIONAL: NAD  RESPIRATORY: Normal respiratory effort; lungs are clear to auscultation bilaterally  CARDIOVASCULAR: Regular rate and rhythm, normal S1 and S2, no murmur/rub/gallop; No lower extremity edema;   ABDOMEN: grimaces to palpation, normoactive bowel sounds, not distended;   MUSCULOSKELETAL:   no clubbing or cyanosis of digits; no joint swelling or tenderness to palpation        LABS:                        7.1    4.83  )-----------( 93       ( 16 Feb 2020 12:55 )             22.3     02-16    139  |  104  |  8   ----------------------------<  114<H>  3.1<L>   |  22  |  1.43<H>    Ca    8.3<L>      16 Feb 2020 12:55  Phos  2.1     02-16  Mg     1.3     02-16                Culture - Blood (collected 15 Feb 2020 06:22)  Source: BLOOD VENOUS  Preliminary Report (16 Feb 2020 06:21):    NO ORGANISMS ISOLATED    NO ORGANISMS ISOLATED AT 24 HOURS    Culture - Blood (collected 15 Feb 2020 06:22)  Source: BLOOD PERIPHERAL  Preliminary Report (16 Feb 2020 06:21):    NO ORGANISMS ISOLATED    NO ORGANISMS ISOLATED AT 24 HOURS    Culture - Blood (collected 14 Feb 2020 07:32)  Source: BLOOD PERIPHERAL  Preliminary Report (16 Feb 2020 07:31):    NO ORGANISMS ISOLATED    NO ORGANISMS ISOLATED AT 48 HRS.        RADIOLOGY & ADDITIONAL TESTS:  Results Reviewed:   Imaging Personally Reviewed:  Electrocardiogram Personally Reviewed:    COORDINATION OF CARE:  Care Discussed with Consultants/Other Providers [Y/N]:  Prior or Outpatient Records Reviewed [Y/N]:

## 2020-02-16 NOTE — PROVIDER CONTACT NOTE (OTHER) - SITUATION
blood pressure 97/58, parameter to call for diastolic less then 60  patient Asymptomatic
patient refused to wear sequential compression devices.
Patient admitted for anemia
Patient admitted for anemia.
Temperature of 100.5
patient Temp 101 oral
patient with Temp 101.6
pt s/p 1 unit PRBC 2/16 finished 2105, need post transfusion CBC drawn?  CBC lab due for 0600 2/17

## 2020-02-16 NOTE — PROGRESS NOTE ADULT - ASSESSMENT
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 Anemia due to blood loss. -likely 2/2 vaginal bleed in the setting of uterine cancer. s/p ablation by IR- now for [place]- pt finally stopped spiking- had drop in hgb- 2/16  Seen by Palliative

## 2020-02-16 NOTE — PROVIDER CONTACT NOTE (OTHER) - DATE AND TIME:
13-Feb-2020 23:45
01-Feb-2020 21:10
08-Feb-2020 21:20
09-Feb-2020 02:45
12-Feb-2020 21:20
13-Feb-2020 21:30
16-Feb-2020 23:30
29-Jan-2020 22:25

## 2020-02-17 LAB
ANION GAP SERPL CALC-SCNC: 11 MMO/L — SIGNIFICANT CHANGE UP (ref 7–14)
BACTERIA BLD CULT: SIGNIFICANT CHANGE UP
BASOPHILS # BLD AUTO: 0.01 K/UL — SIGNIFICANT CHANGE UP (ref 0–0.2)
BASOPHILS NFR BLD AUTO: 0.2 % — SIGNIFICANT CHANGE UP (ref 0–2)
BUN SERPL-MCNC: 8 MG/DL — SIGNIFICANT CHANGE UP (ref 7–23)
CALCIUM SERPL-MCNC: 8.3 MG/DL — LOW (ref 8.4–10.5)
CHLORIDE SERPL-SCNC: 106 MMOL/L — SIGNIFICANT CHANGE UP (ref 98–107)
CO2 SERPL-SCNC: 22 MMOL/L — SIGNIFICANT CHANGE UP (ref 22–31)
CREAT SERPL-MCNC: 1.46 MG/DL — HIGH (ref 0.5–1.3)
EOSINOPHIL # BLD AUTO: 0.19 K/UL — SIGNIFICANT CHANGE UP (ref 0–0.5)
EOSINOPHIL NFR BLD AUTO: 4.5 % — SIGNIFICANT CHANGE UP (ref 0–6)
GLUCOSE SERPL-MCNC: 87 MG/DL — SIGNIFICANT CHANGE UP (ref 70–99)
HCT VFR BLD CALC: 25.5 % — LOW (ref 34.5–45)
HGB BLD-MCNC: 8 G/DL — LOW (ref 11.5–15.5)
IMM GRANULOCYTES NFR BLD AUTO: 0.7 % — SIGNIFICANT CHANGE UP (ref 0–1.5)
LYMPHOCYTES # BLD AUTO: 0.75 K/UL — LOW (ref 1–3.3)
LYMPHOCYTES # BLD AUTO: 17.6 % — SIGNIFICANT CHANGE UP (ref 13–44)
MAGNESIUM SERPL-MCNC: 1.5 MG/DL — LOW (ref 1.6–2.6)
MCHC RBC-ENTMCNC: 28.4 PG — SIGNIFICANT CHANGE UP (ref 27–34)
MCHC RBC-ENTMCNC: 31.4 % — LOW (ref 32–36)
MCV RBC AUTO: 90.4 FL — SIGNIFICANT CHANGE UP (ref 80–100)
MONOCYTES # BLD AUTO: 0.56 K/UL — SIGNIFICANT CHANGE UP (ref 0–0.9)
MONOCYTES NFR BLD AUTO: 13.1 % — SIGNIFICANT CHANGE UP (ref 2–14)
NEUTROPHILS # BLD AUTO: 2.72 K/UL — SIGNIFICANT CHANGE UP (ref 1.8–7.4)
NEUTROPHILS NFR BLD AUTO: 63.9 % — SIGNIFICANT CHANGE UP (ref 43–77)
NRBC # FLD: 0 K/UL — SIGNIFICANT CHANGE UP (ref 0–0)
PHOSPHATE SERPL-MCNC: 2.3 MG/DL — LOW (ref 2.5–4.5)
PLATELET # BLD AUTO: 99 K/UL — LOW (ref 150–400)
PMV BLD: 9.2 FL — SIGNIFICANT CHANGE UP (ref 7–13)
POTASSIUM SERPL-MCNC: 3 MMOL/L — LOW (ref 3.5–5.3)
POTASSIUM SERPL-SCNC: 3 MMOL/L — LOW (ref 3.5–5.3)
RBC # BLD: 2.82 M/UL — LOW (ref 3.8–5.2)
RBC # FLD: 16 % — HIGH (ref 10.3–14.5)
SODIUM SERPL-SCNC: 139 MMOL/L — SIGNIFICANT CHANGE UP (ref 135–145)
WBC # BLD: 4.26 K/UL — SIGNIFICANT CHANGE UP (ref 3.8–10.5)
WBC # FLD AUTO: 4.26 K/UL — SIGNIFICANT CHANGE UP (ref 3.8–10.5)

## 2020-02-17 PROCEDURE — 99232 SBSQ HOSP IP/OBS MODERATE 35: CPT

## 2020-02-17 RX ORDER — POTASSIUM CHLORIDE 20 MEQ
40 PACKET (EA) ORAL ONCE
Refills: 0 | Status: COMPLETED | OUTPATIENT
Start: 2020-02-17 | End: 2020-02-17

## 2020-02-17 RX ORDER — POTASSIUM CHLORIDE 20 MEQ
10 PACKET (EA) ORAL
Refills: 0 | Status: COMPLETED | OUTPATIENT
Start: 2020-02-17 | End: 2020-02-17

## 2020-02-17 RX ORDER — MAGNESIUM SULFATE 500 MG/ML
2 VIAL (ML) INJECTION ONCE
Refills: 0 | Status: COMPLETED | OUTPATIENT
Start: 2020-02-17 | End: 2020-02-17

## 2020-02-17 RX ADMIN — OXYCODONE HYDROCHLORIDE 5 MILLIGRAM(S): 5 TABLET ORAL at 02:36

## 2020-02-17 RX ADMIN — PIPERACILLIN AND TAZOBACTAM 25 GRAM(S): 4; .5 INJECTION, POWDER, LYOPHILIZED, FOR SOLUTION INTRAVENOUS at 22:14

## 2020-02-17 RX ADMIN — CHLORHEXIDINE GLUCONATE 1 APPLICATION(S): 213 SOLUTION TOPICAL at 12:52

## 2020-02-17 RX ADMIN — PIPERACILLIN AND TAZOBACTAM 25 GRAM(S): 4; .5 INJECTION, POWDER, LYOPHILIZED, FOR SOLUTION INTRAVENOUS at 05:57

## 2020-02-17 RX ADMIN — Medication 100 MILLIEQUIVALENT(S): at 10:37

## 2020-02-17 RX ADMIN — PIPERACILLIN AND TAZOBACTAM 25 GRAM(S): 4; .5 INJECTION, POWDER, LYOPHILIZED, FOR SOLUTION INTRAVENOUS at 13:53

## 2020-02-17 RX ADMIN — OXYCODONE HYDROCHLORIDE 5 MILLIGRAM(S): 5 TABLET ORAL at 11:50

## 2020-02-17 RX ADMIN — Medication 100 MILLIEQUIVALENT(S): at 12:53

## 2020-02-17 RX ADMIN — OXYCODONE HYDROCHLORIDE 5 MILLIGRAM(S): 5 TABLET ORAL at 02:06

## 2020-02-17 RX ADMIN — Medication 40 MILLIEQUIVALENT(S): at 10:37

## 2020-02-17 RX ADMIN — OXYCODONE HYDROCHLORIDE 5 MILLIGRAM(S): 5 TABLET ORAL at 14:36

## 2020-02-17 RX ADMIN — Medication 50 GRAM(S): at 11:49

## 2020-02-17 RX ADMIN — Medication 100 MILLIEQUIVALENT(S): at 13:53

## 2020-02-17 RX ADMIN — OXYCODONE HYDROCHLORIDE 5 MILLIGRAM(S): 5 TABLET ORAL at 15:15

## 2020-02-17 RX ADMIN — OXYCODONE HYDROCHLORIDE 5 MILLIGRAM(S): 5 TABLET ORAL at 10:54

## 2020-02-17 NOTE — PROGRESS NOTE ADULT - SUBJECTIVE AND OBJECTIVE BOX
[facility] Division of Hospital Medicine  Mini Coughlin MD  Pager [phone]    Patient is a 69y old  Female who presents with a chief complaint of Sent from Onc office for hypotension (16 Feb 2020 16:23)      SUBJECTIVE / OVERNIGHT EVENTS:  ADDITIONAL REVIEW OF SYSTEMS:    MEDICATIONS  (STANDING):  chlorhexidine 4% Liquid 1 Application(s) Topical daily  gabapentin 300 milliGRAM(s) Oral two times a day  piperacillin/tazobactam IVPB.. 3.375 Gram(s) IV Intermittent every 8 hours  polyethylene glycol 3350 17 Gram(s) Oral daily    MEDICATIONS  (PRN):  acetaminophen   Tablet .. 650 milliGRAM(s) Oral every 6 hours PRN Temp greater or equal to 38C (100.4F), Mild Pain (1 - 3), Moderate Pain (4 - 6)  acetaminophen  IVPB .. 1000 milliGRAM(s) IV Intermittent once PRN Mild Pain (1 - 3)  bisacodyl 5 milliGRAM(s) Oral every 12 hours PRN Constipation  ondansetron Injectable 4 milliGRAM(s) IV Push every 8 hours PRN Nausea and/or Vomiting  oxyCODONE    IR 5 milliGRAM(s) Oral every 3 hours PRN Severe Pain (7 - 10)      CAPILLARY BLOOD GLUCOSE        I&O's Summary    16 Feb 2020 07:01  -  17 Feb 2020 07:00  --------------------------------------------------------  IN: 0 mL / OUT: 100 mL / NET: -100 mL        PHYSICAL EXAM:  Vital Signs Last 24 Hrs  T(C): 37.5 (17 Feb 2020 13:07), Max: 37.5 (17 Feb 2020 13:07)  T(F): 99.5 (17 Feb 2020 13:07), Max: 99.5 (17 Feb 2020 13:07)  HR: 84 (17 Feb 2020 13:07) (84 - 92)  BP: 138/86 (17 Feb 2020 13:07) (121/71 - 145/85)  BP(mean): --  RR: 17 (17 Feb 2020 05:25) (17 - 18)  SpO2: 100% (17 Feb 2020 13:07) (98% - 100%)  CONSTITUTIONAL: NAD, well-developed, well-groomed  EYES: conjunctiva and sclera clear  NECK: Supple  RESPIRATORY: Normal respiratory effort; lungs are clear to auscultation bilaterally  CARDIOVASCULAR: Regular rate and rhythm, normal S1 and S2, no murmur/rub/gallop; No lower extremity edema;   ABDOMEN: Nontender to palpation, normoactive bowel sounds, not distended;   MUSCULOSKELETAL:  Normal gait; no clubbing or cyanosis of digits; no joint swelling or tenderness to palpation  PSYCH: A+O to person, place, and time; affect appropriate      LABS:                        8.0    4.26  )-----------( 99       ( 17 Feb 2020 06:25 )             25.5     02-17    139  |  106  |  8   ----------------------------<  87  3.0<L>   |  22  |  1.46<H>    Ca    8.3<L>      17 Feb 2020 06:25  Phos  2.3     02-17  Mg     1.5     02-17                Culture - Blood (collected 15 Feb 2020 06:22)  Source: BLOOD VENOUS  Preliminary Report (17 Feb 2020 06:21):    NO ORGANISMS ISOLATED    NO ORGANISMS ISOLATED AT 48 HRS.    Culture - Blood (collected 15 Feb 2020 06:22)  Source: BLOOD PERIPHERAL  Preliminary Report (17 Feb 2020 06:21):    NO ORGANISMS ISOLATED    NO ORGANISMS ISOLATED AT 48 HRS.        RADIOLOGY & ADDITIONAL TESTS:  Results Reviewed:   Imaging Personally Reviewed:  Electrocardiogram Personally Reviewed:    COORDINATION OF CARE:  Care Discussed with Consultants/Other Providers [Y/N]:  Prior or Outpatient Records Reviewed [Y/N]: [facility] Division of Hospital Medicine  Mini Coughlin MD  Pager [phone]    Patient is a 69y old  Female who presents with a chief complaint of Sent from Onc office for hypotension (16 Feb 2020 16:23)      SUBJECTIVE / OVERNIGHT EVENTS: pt seen at - felt okay- did not want to be "pushed" on abd exam  ADDITIONAL REVIEW OF SYSTEMS:    MEDICATIONS  (STANDING):  chlorhexidine 4% Liquid 1 Application(s) Topical daily  gabapentin 300 milliGRAM(s) Oral two times a day  piperacillin/tazobactam IVPB.. 3.375 Gram(s) IV Intermittent every 8 hours  polyethylene glycol 3350 17 Gram(s) Oral daily    MEDICATIONS  (PRN):  acetaminophen   Tablet .. 650 milliGRAM(s) Oral every 6 hours PRN Temp greater or equal to 38C (100.4F), Mild Pain (1 - 3), Moderate Pain (4 - 6)  acetaminophen  IVPB .. 1000 milliGRAM(s) IV Intermittent once PRN Mild Pain (1 - 3)  bisacodyl 5 milliGRAM(s) Oral every 12 hours PRN Constipation  ondansetron Injectable 4 milliGRAM(s) IV Push every 8 hours PRN Nausea and/or Vomiting  oxyCODONE    IR 5 milliGRAM(s) Oral every 3 hours PRN Severe Pain (7 - 10)      CAPILLARY BLOOD GLUCOSE        I&O's Summary    16 Feb 2020 07:01  -  17 Feb 2020 07:00  --------------------------------------------------------  IN: 0 mL / OUT: 100 mL / NET: -100 mL        PHYSICAL EXAM:  Vital Signs Last 24 Hrs  T(C): 37.5 (17 Feb 2020 13:07), Max: 37.5 (17 Feb 2020 13:07)  T(F): 99.5 (17 Feb 2020 13:07), Max: 99.5 (17 Feb 2020 13:07)  HR: 84 (17 Feb 2020 13:07) (84 - 92)  BP: 138/86 (17 Feb 2020 13:07) (121/71 - 145/85)  BP(mean): --  RR: 17 (17 Feb 2020 05:25) (17 - 18)  SpO2: 100% (17 Feb 2020 13:07) (98% - 100%)  CONSTITUTIONAL: NAD  RESPIRATORY: Normal respiratory effort; lungs are clear to auscultation bilaterally  CARDIOVASCULAR: Regular rate and rhythm, normal S1 and S2; No lower extremity edema;   ABDOMEN: grimaces to palpation, normoactive bowel sounds, not distended;   MUSCULOSKELETAL:  Normal gait; no clubbing or cyanosis of digits; no joint swelling or tenderness to palpation        LABS:                        8.0    4.26  )-----------( 99       ( 17 Feb 2020 06:25 )             25.5     02-17    139  |  106  |  8   ----------------------------<  87  3.0<L>   |  22  |  1.46<H>    Ca    8.3<L>      17 Feb 2020 06:25  Phos  2.3     02-17  Mg     1.5     02-17                Culture - Blood (collected 15 Feb 2020 06:22)  Source: BLOOD VENOUS  Preliminary Report (17 Feb 2020 06:21):    NO ORGANISMS ISOLATED    NO ORGANISMS ISOLATED AT 48 HRS.    Culture - Blood (collected 15 Feb 2020 06:22)  Source: BLOOD PERIPHERAL  Preliminary Report (17 Feb 2020 06:21):    NO ORGANISMS ISOLATED    NO ORGANISMS ISOLATED AT 48 HRS.        RADIOLOGY & ADDITIONAL TESTS:  Results Reviewed:   Imaging Personally Reviewed:  Electrocardiogram Personally Reviewed:    COORDINATION OF CARE:  Care Discussed with Consultants/Other Providers [Y/N]:  Prior or Outpatient Records Reviewed [Y/N]:

## 2020-02-17 NOTE — PROGRESS NOTE ADULT - ASSESSMENT
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 Anemia due to blood loss. -likely 2/2 vaginal bleed in the setting of uterine cancer. s/p ablation by IR- now for [place]- pt finally stopped spiking- had drop in hgb- 2/16  Seen by Palliative

## 2020-02-18 LAB
ALBUMIN SERPL ELPH-MCNC: 2.5 G/DL — LOW (ref 3.3–5)
ALP SERPL-CCNC: 110 U/L — SIGNIFICANT CHANGE UP (ref 40–120)
ALT FLD-CCNC: 14 U/L — SIGNIFICANT CHANGE UP (ref 4–33)
ANION GAP SERPL CALC-SCNC: 11 MMO/L — SIGNIFICANT CHANGE UP (ref 7–14)
AST SERPL-CCNC: 19 U/L — SIGNIFICANT CHANGE UP (ref 4–32)
BILIRUB SERPL-MCNC: 0.4 MG/DL — SIGNIFICANT CHANGE UP (ref 0.2–1.2)
BUN SERPL-MCNC: 7 MG/DL — SIGNIFICANT CHANGE UP (ref 7–23)
CALCIUM SERPL-MCNC: 8.7 MG/DL — SIGNIFICANT CHANGE UP (ref 8.4–10.5)
CHLORIDE SERPL-SCNC: 105 MMOL/L — SIGNIFICANT CHANGE UP (ref 98–107)
CO2 SERPL-SCNC: 23 MMOL/L — SIGNIFICANT CHANGE UP (ref 22–31)
CREAT SERPL-MCNC: 1.48 MG/DL — HIGH (ref 0.5–1.3)
GLUCOSE SERPL-MCNC: 84 MG/DL — SIGNIFICANT CHANGE UP (ref 70–99)
HCT VFR BLD CALC: 26.3 % — LOW (ref 34.5–45)
HGB BLD-MCNC: 8.6 G/DL — LOW (ref 11.5–15.5)
MAGNESIUM SERPL-MCNC: 1.9 MG/DL — SIGNIFICANT CHANGE UP (ref 1.6–2.6)
MCHC RBC-ENTMCNC: 29.1 PG — SIGNIFICANT CHANGE UP (ref 27–34)
MCHC RBC-ENTMCNC: 32.7 % — SIGNIFICANT CHANGE UP (ref 32–36)
MCV RBC AUTO: 88.9 FL — SIGNIFICANT CHANGE UP (ref 80–100)
NRBC # FLD: 0 K/UL — SIGNIFICANT CHANGE UP (ref 0–0)
PHOSPHATE SERPL-MCNC: 2.5 MG/DL — SIGNIFICANT CHANGE UP (ref 2.5–4.5)
PLATELET # BLD AUTO: 98 K/UL — LOW (ref 150–400)
PMV BLD: 8.8 FL — SIGNIFICANT CHANGE UP (ref 7–13)
POTASSIUM SERPL-MCNC: 3.6 MMOL/L — SIGNIFICANT CHANGE UP (ref 3.5–5.3)
POTASSIUM SERPL-SCNC: 3.6 MMOL/L — SIGNIFICANT CHANGE UP (ref 3.5–5.3)
PROT SERPL-MCNC: 6.6 G/DL — SIGNIFICANT CHANGE UP (ref 6–8.3)
RBC # BLD: 2.96 M/UL — LOW (ref 3.8–5.2)
RBC # FLD: 16.3 % — HIGH (ref 10.3–14.5)
SODIUM SERPL-SCNC: 139 MMOL/L — SIGNIFICANT CHANGE UP (ref 135–145)
WBC # BLD: 5.29 K/UL — SIGNIFICANT CHANGE UP (ref 3.8–10.5)
WBC # FLD AUTO: 5.29 K/UL — SIGNIFICANT CHANGE UP (ref 3.8–10.5)

## 2020-02-18 PROCEDURE — 99233 SBSQ HOSP IP/OBS HIGH 50: CPT

## 2020-02-18 RX ADMIN — PIPERACILLIN AND TAZOBACTAM 25 GRAM(S): 4; .5 INJECTION, POWDER, LYOPHILIZED, FOR SOLUTION INTRAVENOUS at 06:22

## 2020-02-18 RX ADMIN — OXYCODONE HYDROCHLORIDE 5 MILLIGRAM(S): 5 TABLET ORAL at 19:36

## 2020-02-18 RX ADMIN — PIPERACILLIN AND TAZOBACTAM 25 GRAM(S): 4; .5 INJECTION, POWDER, LYOPHILIZED, FOR SOLUTION INTRAVENOUS at 21:26

## 2020-02-18 RX ADMIN — OXYCODONE HYDROCHLORIDE 5 MILLIGRAM(S): 5 TABLET ORAL at 14:14

## 2020-02-18 RX ADMIN — OXYCODONE HYDROCHLORIDE 5 MILLIGRAM(S): 5 TABLET ORAL at 06:26

## 2020-02-18 RX ADMIN — CHLORHEXIDINE GLUCONATE 1 APPLICATION(S): 213 SOLUTION TOPICAL at 13:19

## 2020-02-18 RX ADMIN — OXYCODONE HYDROCHLORIDE 5 MILLIGRAM(S): 5 TABLET ORAL at 06:56

## 2020-02-18 RX ADMIN — OXYCODONE HYDROCHLORIDE 5 MILLIGRAM(S): 5 TABLET ORAL at 13:18

## 2020-02-18 RX ADMIN — OXYCODONE HYDROCHLORIDE 5 MILLIGRAM(S): 5 TABLET ORAL at 01:28

## 2020-02-18 RX ADMIN — OXYCODONE HYDROCHLORIDE 5 MILLIGRAM(S): 5 TABLET ORAL at 00:58

## 2020-02-18 RX ADMIN — OXYCODONE HYDROCHLORIDE 5 MILLIGRAM(S): 5 TABLET ORAL at 20:30

## 2020-02-18 RX ADMIN — PIPERACILLIN AND TAZOBACTAM 25 GRAM(S): 4; .5 INJECTION, POWDER, LYOPHILIZED, FOR SOLUTION INTRAVENOUS at 13:19

## 2020-02-18 NOTE — PROGRESS NOTE ADULT - ATTENDING COMMENTS
CM- awaiting [place]  ID- Iv Zosyn x 10 days or transition to PO Cipro and Flagyl x 9 days for total of 14 days    40 days to coordinate d/c CM- awaiting [place]  ID- Iv Zosyn x 10 days or transition to PO Cipro and Flagyl x 9 days for total of 14 days    40 min to coordinate d/c

## 2020-02-18 NOTE — PROGRESS NOTE ADULT - ASSESSMENT
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 Anemia due to blood loss. -likely 2/2 vaginal bleed in the setting of uterine cancer. s/p ablation by IR- now for [place]- pt finally stopped spiking- had drop in hgb- 2/16  Seen by Palliative

## 2020-02-19 LAB
ALBUMIN SERPL ELPH-MCNC: 2.3 G/DL — LOW (ref 3.3–5)
ALP SERPL-CCNC: 103 U/L — SIGNIFICANT CHANGE UP (ref 40–120)
ALT FLD-CCNC: 18 U/L — SIGNIFICANT CHANGE UP (ref 4–33)
ANION GAP SERPL CALC-SCNC: 10 MMO/L — SIGNIFICANT CHANGE UP (ref 7–14)
AST SERPL-CCNC: 27 U/L — SIGNIFICANT CHANGE UP (ref 4–32)
BACTERIA BLD CULT: SIGNIFICANT CHANGE UP
BILIRUB SERPL-MCNC: 0.3 MG/DL — SIGNIFICANT CHANGE UP (ref 0.2–1.2)
BUN SERPL-MCNC: 9 MG/DL — SIGNIFICANT CHANGE UP (ref 7–23)
CALCIUM SERPL-MCNC: 8.5 MG/DL — SIGNIFICANT CHANGE UP (ref 8.4–10.5)
CHLORIDE SERPL-SCNC: 102 MMOL/L — SIGNIFICANT CHANGE UP (ref 98–107)
CO2 SERPL-SCNC: 23 MMOL/L — SIGNIFICANT CHANGE UP (ref 22–31)
CREAT SERPL-MCNC: 1.48 MG/DL — HIGH (ref 0.5–1.3)
GLUCOSE SERPL-MCNC: 85 MG/DL — SIGNIFICANT CHANGE UP (ref 70–99)
HCT VFR BLD CALC: 25.6 % — LOW (ref 34.5–45)
HGB BLD-MCNC: 8.2 G/DL — LOW (ref 11.5–15.5)
MAGNESIUM SERPL-MCNC: 1.7 MG/DL — SIGNIFICANT CHANGE UP (ref 1.6–2.6)
MCHC RBC-ENTMCNC: 28.6 PG — SIGNIFICANT CHANGE UP (ref 27–34)
MCHC RBC-ENTMCNC: 32 % — SIGNIFICANT CHANGE UP (ref 32–36)
MCV RBC AUTO: 89.2 FL — SIGNIFICANT CHANGE UP (ref 80–100)
NRBC # FLD: 0 K/UL — SIGNIFICANT CHANGE UP (ref 0–0)
PHOSPHATE SERPL-MCNC: 2.9 MG/DL — SIGNIFICANT CHANGE UP (ref 2.5–4.5)
PLATELET # BLD AUTO: 93 K/UL — LOW (ref 150–400)
PMV BLD: 9.1 FL — SIGNIFICANT CHANGE UP (ref 7–13)
POTASSIUM SERPL-MCNC: 3.3 MMOL/L — LOW (ref 3.5–5.3)
POTASSIUM SERPL-SCNC: 3.3 MMOL/L — LOW (ref 3.5–5.3)
PROT SERPL-MCNC: 6.4 G/DL — SIGNIFICANT CHANGE UP (ref 6–8.3)
RBC # BLD: 2.87 M/UL — LOW (ref 3.8–5.2)
RBC # FLD: 15.8 % — HIGH (ref 10.3–14.5)
SODIUM SERPL-SCNC: 135 MMOL/L — SIGNIFICANT CHANGE UP (ref 135–145)
WBC # BLD: 4.47 K/UL — SIGNIFICANT CHANGE UP (ref 3.8–10.5)
WBC # FLD AUTO: 4.47 K/UL — SIGNIFICANT CHANGE UP (ref 3.8–10.5)

## 2020-02-19 PROCEDURE — 99232 SBSQ HOSP IP/OBS MODERATE 35: CPT

## 2020-02-19 RX ORDER — POTASSIUM CHLORIDE 20 MEQ
40 PACKET (EA) ORAL ONCE
Refills: 0 | Status: COMPLETED | OUTPATIENT
Start: 2020-02-19 | End: 2020-02-19

## 2020-02-19 RX ADMIN — OXYCODONE HYDROCHLORIDE 5 MILLIGRAM(S): 5 TABLET ORAL at 10:07

## 2020-02-19 RX ADMIN — OXYCODONE HYDROCHLORIDE 5 MILLIGRAM(S): 5 TABLET ORAL at 18:14

## 2020-02-19 RX ADMIN — OXYCODONE HYDROCHLORIDE 5 MILLIGRAM(S): 5 TABLET ORAL at 04:00

## 2020-02-19 RX ADMIN — OXYCODONE HYDROCHLORIDE 5 MILLIGRAM(S): 5 TABLET ORAL at 15:34

## 2020-02-19 RX ADMIN — OXYCODONE HYDROCHLORIDE 5 MILLIGRAM(S): 5 TABLET ORAL at 03:16

## 2020-02-19 RX ADMIN — OXYCODONE HYDROCHLORIDE 5 MILLIGRAM(S): 5 TABLET ORAL at 19:08

## 2020-02-19 RX ADMIN — OXYCODONE HYDROCHLORIDE 5 MILLIGRAM(S): 5 TABLET ORAL at 11:07

## 2020-02-19 RX ADMIN — OXYCODONE HYDROCHLORIDE 5 MILLIGRAM(S): 5 TABLET ORAL at 14:55

## 2020-02-19 RX ADMIN — Medication 40 MILLIEQUIVALENT(S): at 10:07

## 2020-02-19 RX ADMIN — OXYCODONE HYDROCHLORIDE 5 MILLIGRAM(S): 5 TABLET ORAL at 22:24

## 2020-02-19 RX ADMIN — PIPERACILLIN AND TAZOBACTAM 25 GRAM(S): 4; .5 INJECTION, POWDER, LYOPHILIZED, FOR SOLUTION INTRAVENOUS at 21:45

## 2020-02-19 RX ADMIN — OXYCODONE HYDROCHLORIDE 5 MILLIGRAM(S): 5 TABLET ORAL at 21:44

## 2020-02-19 RX ADMIN — CHLORHEXIDINE GLUCONATE 1 APPLICATION(S): 213 SOLUTION TOPICAL at 14:55

## 2020-02-19 RX ADMIN — PIPERACILLIN AND TAZOBACTAM 25 GRAM(S): 4; .5 INJECTION, POWDER, LYOPHILIZED, FOR SOLUTION INTRAVENOUS at 14:55

## 2020-02-19 RX ADMIN — PIPERACILLIN AND TAZOBACTAM 25 GRAM(S): 4; .5 INJECTION, POWDER, LYOPHILIZED, FOR SOLUTION INTRAVENOUS at 05:45

## 2020-02-19 NOTE — CHART NOTE - NSCHARTNOTEFT_GEN_A_CORE
Source: Patient [X]    Family [ ]     other [ ]    Diet : Diet, Regular:   No Fish (01-27-20 @ 23:44)        Patient reports good appetite/ fair PO intake due to the dislike of the hospital food. Patient reports consuming >/=75%, and that her family has been bringing in food from outside and she will eat that.  Patient denies any nausea/vomiting/diarrhea/constipation or difficulty chewing and swallowing. No questions or concerns voiced at this time    Current Weight: (1/28/20) 165.7 lbs- no new weights obtained  % Weight Change    Pertinent Medications: MEDICATIONS  (STANDING):  chlorhexidine 4% Liquid 1 Application(s) Topical daily  gabapentin 300 milliGRAM(s) Oral two times a day  piperacillin/tazobactam IVPB.. 3.375 Gram(s) IV Intermittent every 8 hours  polyethylene glycol 3350 17 Gram(s) Oral daily    MEDICATIONS  (PRN):  acetaminophen   Tablet .. 650 milliGRAM(s) Oral every 6 hours PRN Temp greater or equal to 38C (100.4F), Mild Pain (1 - 3), Moderate Pain (4 - 6)  acetaminophen  IVPB .. 1000 milliGRAM(s) IV Intermittent once PRN Mild Pain (1 - 3)  bisacodyl 5 milliGRAM(s) Oral every 12 hours PRN Constipation  ondansetron Injectable 4 milliGRAM(s) IV Push every 8 hours PRN Nausea and/or Vomiting  oxyCODONE    IR 5 milliGRAM(s) Oral every 3 hours PRN Severe Pain (7 - 10)    Pertinent Labs:  02-19 Na135 mmol/L Glu 85 mg/dL K+ 3.3 mmol/L<L> Cr  1.48 mg/dL<H> BUN 9 mg/dL 02-19 Phos 2.9 mg/dL 02-19 Alb 2.3 g/dL<L>    Skin: Right chest wall, skin tear wound noted.   +1 b/l ankle, foot edema noted.       Estimated Needs:   [X] no change since previous assessment  [ ] recalculated:       Additional Recommendations:  1. Continue current diet order, which remains appropriate at this time.   2. Monitor weights, labs, BM's, skin integrity, p.o. intake.   3. Please Encourage po intake, assist with meals and menu selections, provide alternatives PRN.

## 2020-02-19 NOTE — PROGRESS NOTE ADULT - ASSESSMENT
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 Anemia due to blood loss. -likely 2/2 vaginal bleed in the setting of uterine cancer. s/p ablation by IR- now for [place]- pt finally stopped spiking- had drop in hgb- 2/16  Seen by Palliative- [place] for [facility] then transition to Hospice with [place]

## 2020-02-19 NOTE — PROGRESS NOTE ADULT - SUBJECTIVE AND OBJECTIVE BOX
Patient is a 69y old  Female who presents with a chief complaint of Sent from Onc office for hypotension (18 Feb 2020 09:46)      SUBJECTIVE / OVERNIGHT EVENTS:  Patient sitting in chair- ready to go to [place] in the [place]    MEDICATIONS  (STANDING):  chlorhexidine 4% Liquid 1 Application(s) Topical daily  gabapentin 300 milliGRAM(s) Oral two times a day  piperacillin/tazobactam IVPB.. 3.375 Gram(s) IV Intermittent every 8 hours  polyethylene glycol 3350 17 Gram(s) Oral daily    MEDICATIONS  (PRN):  acetaminophen   Tablet .. 650 milliGRAM(s) Oral every 6 hours PRN Temp greater or equal to 38C (100.4F), Mild Pain (1 - 3), Moderate Pain (4 - 6)  acetaminophen  IVPB .. 1000 milliGRAM(s) IV Intermittent once PRN Mild Pain (1 - 3)  bisacodyl 5 milliGRAM(s) Oral every 12 hours PRN Constipation  ondansetron Injectable 4 milliGRAM(s) IV Push every 8 hours PRN Nausea and/or Vomiting  oxyCODONE    IR 5 milliGRAM(s) Oral every 3 hours PRN Severe Pain (7 - 10)          I&O's Summary    18 Feb 2020 07:01  -  19 Feb 2020 07:00  --------------------------------------------------------  IN: 480 mL / OUT: 0 mL / NET: 480 mL    Vital Signs Last 24 Hrs  T(C): 36.2 (19 Feb 2020 05:36), Max: 37.2 (18 Feb 2020 21:26)  T(F): 97.1 (19 Feb 2020 05:36), Max: 99 (18 Feb 2020 21:26)  HR: 94 (19 Feb 2020 05:36) (86 - 94)  BP: 143/87 (19 Feb 2020 05:36) (121/87 - 143/87)  BP(mean): --  RR: 17 (19 Feb 2020 05:36) (17 - 18)  SpO2: 100% (19 Feb 2020 05:36) (96% - 100%)    PHYSICAL EXAM:  GENERAL: NAD, well-developed  HEAD:  Atraumatic, Normocephalic  EYES: EOMI, PERRLA, conjunctiva and sclera clear  NECK: Supple, No JVD  CHEST/LUNG: Clear to auscultation bilaterally; No wheeze  HEART: Regular rate and rhythm; No murmurs, rubs, or gallops  ABDOMEN: Soft, Nontender, Nondistended; Bowel sounds present  EXTREMITIES:  2+ Peripheral Pulses, No clubbing, cyanosis, or edema  PSYCH: AAOx3  NEUROLOGY: non-focal  SKIN: No rashes or lesions    LABS:                        8.2    4.47  )-----------( 93       ( 19 Feb 2020 06:50 )             25.6     02-19    135  |  102  |  9   ----------------------------<  85  3.3<L>   |  23  |  1.48<H>    Ca    8.5      19 Feb 2020 06:50  Phos  2.9     02-19  Mg     1.7     02-19    TPro  6.4  /  Alb  2.3<L>  /  TBili  0.3  /  DBili  x   /  AST  27  /  ALT  18  /  AlkPhos  103  02-19              RADIOLOGY & ADDITIONAL TESTS:    Imaging Personally Reviewed:    Consultant(s) Notes Reviewed:      Care Discussed with Consultants/Other Providers:

## 2020-02-19 NOTE — PROGRESS NOTE ADULT - ATTENDING COMMENTS
CM- awaiting [place]  ID- Iv Zosyn x 10 days or transition to PO Cipro and Flagyl x 9 days for total of 14 days    40 min to coordinate d/c

## 2020-02-20 ENCOUNTER — TRANSCRIPTION ENCOUNTER (OUTPATIENT)
Age: 70
End: 2020-02-20

## 2020-02-20 VITALS
DIASTOLIC BLOOD PRESSURE: 72 MMHG | RESPIRATION RATE: 17 BRPM | HEART RATE: 98 BPM | OXYGEN SATURATION: 99 % | TEMPERATURE: 99 F | SYSTOLIC BLOOD PRESSURE: 130 MMHG

## 2020-02-20 LAB
BACTERIA BLD CULT: SIGNIFICANT CHANGE UP
BACTERIA BLD CULT: SIGNIFICANT CHANGE UP

## 2020-02-20 PROCEDURE — 99239 HOSP IP/OBS DSCHRG MGMT >30: CPT

## 2020-02-20 RX ORDER — GABAPENTIN 400 MG/1
1 CAPSULE ORAL
Qty: 0 | Refills: 0 | DISCHARGE
Start: 2020-02-20

## 2020-02-20 RX ORDER — OXYCODONE HYDROCHLORIDE 5 MG/1
1 TABLET ORAL
Qty: 0 | Refills: 0 | DISCHARGE
Start: 2020-02-20

## 2020-02-20 RX ORDER — APREPITANT 80 MG/1
1 CAPSULE ORAL
Qty: 0 | Refills: 0 | DISCHARGE

## 2020-02-20 RX ORDER — ACETAMINOPHEN 500 MG
2 TABLET ORAL
Qty: 0 | Refills: 0 | DISCHARGE
Start: 2020-02-20

## 2020-02-20 RX ORDER — DEXAMETHASONE 0.5 MG/5ML
5 ELIXIR ORAL
Qty: 0 | Refills: 0 | DISCHARGE

## 2020-02-20 RX ORDER — TRAMADOL HYDROCHLORIDE 50 MG/1
1 TABLET ORAL
Qty: 0 | Refills: 0 | DISCHARGE

## 2020-02-20 RX ORDER — POLYETHYLENE GLYCOL 3350 17 G/17G
17 POWDER, FOR SOLUTION ORAL
Qty: 0 | Refills: 0 | DISCHARGE
Start: 2020-02-20

## 2020-02-20 RX ORDER — ONDANSETRON 8 MG/1
4 TABLET, FILM COATED ORAL
Qty: 0 | Refills: 0 | DISCHARGE
Start: 2020-02-20

## 2020-02-20 RX ORDER — GABAPENTIN 400 MG/1
1 CAPSULE ORAL
Qty: 0 | Refills: 0 | DISCHARGE

## 2020-02-20 RX ORDER — PIPERACILLIN AND TAZOBACTAM 4; .5 G/20ML; G/20ML
3.38 INJECTION, POWDER, LYOPHILIZED, FOR SOLUTION INTRAVENOUS
Qty: 0 | Refills: 0 | DISCHARGE
Start: 2020-02-20

## 2020-02-20 RX ADMIN — OXYCODONE HYDROCHLORIDE 5 MILLIGRAM(S): 5 TABLET ORAL at 04:30

## 2020-02-20 RX ADMIN — PIPERACILLIN AND TAZOBACTAM 25 GRAM(S): 4; .5 INJECTION, POWDER, LYOPHILIZED, FOR SOLUTION INTRAVENOUS at 06:21

## 2020-02-20 RX ADMIN — OXYCODONE HYDROCHLORIDE 5 MILLIGRAM(S): 5 TABLET ORAL at 03:35

## 2020-02-20 NOTE — PROGRESS NOTE ADULT - PROBLEM SELECTOR PROBLEM 5
Discharge planning issues
Malignant neoplasm of uterus, unspecified site
Discharge planning issues
Encounter for palliative care
Malignant neoplasm of uterus, unspecified site
Discharge planning issues
Malignant neoplasm of uterus, unspecified site
Discharge planning issues

## 2020-02-20 NOTE — PROGRESS NOTE ADULT - PROBLEM SELECTOR PLAN 3
-Leukocytosis and tachycardia without a source of infection. Likely from bleed  - monitor off abx
-Stage IV uterine cancer on Carboplatin, last dose 12/4.    -s/p C7 with carboplatin/Taxol, now on single agent  -ONc rec palliative consult since bleed is not stopping  -f/u gyn onc recs- no intervention  Onc notes poor options  HOSPICE RN ana laura called
- 20 minutes at bedside with patient.  - Patient is getting exceedingly tired concerned that there will be no further disease modifying interventions.  - She is strongly considering DNR and Hospice services, but still requests time to think about it.
- Unclear whether further disease modifying interventions are available.
- Unclear whether further disease modifying interventions are available.  - As per patient, she only wants disease modifying interventions if it will improve her quality of life. She is not interested in prolonging the dying process if the interventions will not significantly improve her symptoms.
-Leukocytosis and tachycardia without a source of infection. Likely from bleed  - monitor off abx
-Leukocytosis and tachycardia without a source of infection. Likely from bleed. Now improved.   - monitor off abx
-Stage IV uterine cancer on Carboplatin, last dose 12/4.    -s/p C7 with carboplatin/Taxol, now on single agent  -ONc rec palliative consult since bleed is not stopping  -f/u gyn onc recs- no intervention  Onc notes poor options  HOSPICE RN ana laura called
-Stage IV uterine cancer on Carboplatin, last dose 12/4.    -s/p C7 with carboplatin/Taxol, now on single agent  -ONc rec palliative.  H/h stable   -f/u gyn onc recs- no intervention  Onc notes poor options  HOSPICE RN eval called
-Stage IV uterine cancer on Carboplatin, last dose 12/4.    -s/p C7 with carboplatin/Taxol, now on single agent  -ONc rec palliative.  H/h stable   -f/u gyn onc recs- no intervention  Onc notes poor options  HOSPICE RN eval called
-Leukocytosis and tachycardia without a source of infection. Likely from bleed  - monitor off abx
-Leukocytosis and tachycardia without a source of infection. Likely from bleed. Now improved.   - monitor off abx
-Stage IV uterine cancer on Carboplatin, last dose 12/4.    -s/p C7 with carboplatin/Taxol, now on single agent  -ONc rec palliative consult since bleed is not stopping  -f/u gyn onc recs- no intervention  Onc notes poor options  HOSPICE RN ana laura called
-Leukocytosis and tachycardia without a source of infection. Likely from bleed  - monitor off abx
-Leukocytosis and tachycardia without a source of infection. Likely from bleed. Now improved.   - monitor off abx
-Stage IV uterine cancer on Carboplatin, last dose 12/4.    -s/p C7 with carboplatin/Taxol, now on single agent  -ONc rec palliative consult since bleed is not stopping  -f/u gyn onc recs- no intervention  Onc notes poor options  HOSPICE RN ana laura called

## 2020-02-20 NOTE — PROGRESS NOTE ADULT - PROBLEM SELECTOR PROBLEM 7
Discharge planning issues
Fever
Discharge planning issues
Fever
Discharge planning issues
Fever
Discharge planning issues
Discharge planning issues
Fever

## 2020-02-20 NOTE — PROGRESS NOTE ADULT - PROBLEM SELECTOR PROBLEM 2
STEVEN (acute kidney injury)
STEVEN (acute kidney injury)
Nausea & vomiting
STEVEN (acute kidney injury)
STEVNE (acute kidney injury)
STEVEN (acute kidney injury)

## 2020-02-20 NOTE — PROGRESS NOTE ADULT - PROBLEM SELECTOR PLAN 7
Transitions of Care Status:  1.  Name of PCP: Dr. Asiya Garcia  2.  PCP Contacted on Admission: [ ] Y    [x ] N    3.  PCP contacted at Discharge: [ ] Y    [ ] N    [ ] N/A  4.  Post-Discharge Appointment Date and Location:  5.  Summary of Handoff given to PCP:
possible UTI  on zosyn for now
Blood cult-testing  CXR- P results  Urine and cult sent  on zosyn for now  if cont to spike ?ID ana laura
Transitions of Care Status:  1.  Name of PCP: Dr. Asiya Garcia  2.  PCP Contacted on Admission: [ ] Y    [x ] N    3.  PCP contacted at Discharge: [ ] Y    [ ] N    [ ] N/A  4.  Post-Discharge Appointment Date and Location:  5.  Summary of Handoff given to PCP:
possible UTI  on zosyn for now
possible UTI  on zosyn for now
possible UTI  on zosyn for now.  Will need Zosyn for 10 days
possible UTI  on zosyn for now.  Will need Zosyn for 10 days
Blood cult-testing  CXR- P results  Urine and cult sent  Start Levaquin 500 mg po QD x 7 days  Patient is not septic or toxic
Transitions of Care Status:  1.  Name of PCP: Dr. Asiya Garcia  2.  PCP Contacted on Admission: [ ] Y    [x ] N    3.  PCP contacted at Discharge: [ ] Y    [ ] N    [ ] N/A  4.  Post-Discharge Appointment Date and Location:  5.  Summary of Handoff given to PCP:
Blood cult-testing  CXR- P results  Urine and cult sent  Start Levaquin 500 mg po QD x 7 days  Patient is not septic or toxic

## 2020-02-20 NOTE — DISCHARGE NOTE NURSING/CASE MANAGEMENT/SOCIAL WORK - PATIENT PORTAL LINK FT
You can access the FollowMyHealth Patient Portal offered by [facility] by registering at the following website: [email or web address]. By joining [employer]’s FollowMyHealth portal, you will also be able to view your health information using other applications (apps) compatible with our system.

## 2020-02-20 NOTE — PROGRESS NOTE ADULT - PROBLEM SELECTOR PLAN 4
-Hold home losartan as BP soft and STEVEN
-pt passing flatus at this time  -start on miralax  -continue to monitor
- Patient with stage IV uterine cancer.  - Advanced disease.  - Supportive care.  - Ongoing discussions about goals of care and hospice.  - Overall prognosis is guarded.
- Patient with stage IV uterine cancer.  - Advanced disease.  - Supportive care.  - Ongoing discussions about goals of care and hospice.  - Overall prognosis is guarded.
- Unclear whether further disease modifying interventions are available.  - As per patient, she only wants disease modifying interventions if it will improve her quality of life. She is not interested in prolonging the dying process if the interventions will not significantly improve her symptoms.
-Hold home losartan as BP soft and STEVEN
-pt passing flatus at this time  -start on miralax  -continue to monitor
-Hold home losartan as BP soft and STEVEN
-pt passing flatus at this time  -start on miralax  -continue to monitor
-Hold home losartan as BP soft and STEVEN

## 2020-02-20 NOTE — PROGRESS NOTE ADULT - ASSESSMENT
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 Anemia due to blood loss. -likely 2/2 vaginal bleed in the setting of uterine cancer. s/p ablation by IR- now for [place]- pt finally stopped spiking- had drop in hgb- 2/16  Seen by Palliative- Trying for [facility] then transition to Hospice with [place]

## 2020-02-20 NOTE — PROGRESS NOTE ADULT - SUBJECTIVE AND OBJECTIVE BOX
Patient is a 69y old  Female who presents with a chief complaint of Sent from Onc office for hypotension (19 Feb 2020 10:23)      SUBJECTIVE / OVERNIGHT EVENTS:  Patient to go to [facility] today    MEDICATIONS  (STANDING):  chlorhexidine 4% Liquid 1 Application(s) Topical daily  gabapentin 300 milliGRAM(s) Oral two times a day  piperacillin/tazobactam IVPB.. 3.375 Gram(s) IV Intermittent every 8 hours  polyethylene glycol 3350 17 Gram(s) Oral daily    MEDICATIONS  (PRN):  acetaminophen   Tablet .. 650 milliGRAM(s) Oral every 6 hours PRN Temp greater or equal to 38C (100.4F), Mild Pain (1 - 3), Moderate Pain (4 - 6)  acetaminophen  IVPB .. 1000 milliGRAM(s) IV Intermittent once PRN Mild Pain (1 - 3)  bisacodyl 5 milliGRAM(s) Oral every 12 hours PRN Constipation  ondansetron Injectable 4 milliGRAM(s) IV Push every 8 hours PRN Nausea and/or Vomiting  oxyCODONE    IR 5 milliGRAM(s) Oral every 3 hours PRN Severe Pain (7 - 10)    Vital Signs Last 24 Hrs  T(C): 37.2 (20 Feb 2020 06:21), Max: 37.2 (20 Feb 2020 06:21)  T(F): 99 (20 Feb 2020 06:21), Max: 99 (20 Feb 2020 06:21)  HR: 98 (20 Feb 2020 06:21) (77 - 98)  BP: 127/72 (20 Feb 2020 06:21) (114/68 - 130/71)  BP(mean): --  RR: 17 (20 Feb 2020 06:21) (17 - 18)  SpO2: 99% (20 Feb 2020 06:21) (97% - 100%)      PHYSICAL EXAM:  GENERAL: NAD, well-developed  HEAD:  Atraumatic, Normocephalic  EYES: EOMI, PERRLA, conjunctiva and sclera clear  NECK: Supple, No JVD  CHEST/LUNG: Clear to auscultation bilaterally; No wheeze  HEART: Regular rate and rhythm; No murmurs, rubs, or gallops  ABDOMEN: Soft, Nontender, Nondistended; Bowel sounds present  EXTREMITIES:  2+ Peripheral Pulses, No clubbing, cyanosis, or edema  PSYCH: AAOx3  NEUROLOGY: non-focal  SKIN: No rashes or lesions    LABS:                        8.2    4.47  )-----------( 93       ( 19 Feb 2020 06:50 )             25.6     02-19    135  |  102  |  9   ----------------------------<  85  3.3<L>   |  23  |  1.48<H>    Ca    8.5      19 Feb 2020 06:50  Phos  2.9     02-19  Mg     1.7     02-19    TPro  6.4  /  Alb  2.3<L>  /  TBili  0.3  /  DBili  x   /  AST  27  /  ALT  18  /  AlkPhos  103  02-19              RADIOLOGY & ADDITIONAL TESTS:    Imaging Personally Reviewed:    Consultant(s) Notes Reviewed:      Care Discussed with Consultants/Other Providers: Patient is a 69y old  Female who presents with a chief complaint of Sent from Onc office for hypotension (19 Feb 2020 10:23)      SUBJECTIVE / OVERNIGHT EVENTS:  Patient to go to [facility] today    MEDICATIONS  (STANDING):  chlorhexidine 4% Liquid 1 Application(s) Topical daily  gabapentin 300 milliGRAM(s) Oral two times a day  piperacillin/tazobactam IVPB.. 3.375 Gram(s) IV Intermittent every 8 hours  polyethylene glycol 3350 17 Gram(s) Oral daily    MEDICATIONS  (PRN):  acetaminophen   Tablet .. 650 milliGRAM(s) Oral every 6 hours PRN Temp greater or equal to 38C (100.4F), Mild Pain (1 - 3), Moderate Pain (4 - 6)  acetaminophen  IVPB .. 1000 milliGRAM(s) IV Intermittent once PRN Mild Pain (1 - 3)  bisacodyl 5 milliGRAM(s) Oral every 12 hours PRN Constipation  ondansetron Injectable 4 milliGRAM(s) IV Push every 8 hours PRN Nausea and/or Vomiting  oxyCODONE    IR 5 milliGRAM(s) Oral every 3 hours PRN Severe Pain (7 - 10)    Vital Signs Last 24 Hrs  T(C): 37.2 (20 Feb 2020 06:21), Max: 37.2 (20 Feb 2020 06:21)  T(F): 99 (20 Feb 2020 06:21), Max: 99 (20 Feb 2020 06:21)  HR: 98 (20 Feb 2020 06:21) (77 - 98)  BP: 127/72 (20 Feb 2020 06:21) (114/68 - 130/71)  BP(mean): --  RR: 17 (20 Feb 2020 06:21) (17 - 18)  SpO2: 99% (20 Feb 2020 06:21) (97% - 100%)      PHYSICAL EXAM:  GENERAL: NAD, well-developed  HEAD:  Atraumatic, Normocephalic  EYES: EOMI, PERRLA, conjunctiva and sclera clear  NECK: Supple, No JVD  CHEST/LUNG: Clear to auscultation bilaterally; No wheeze  R chest port  HEART: Regular rate and rhythm; No murmurs, rubs, or gallops  ABDOMEN: Soft, Nontender, Nondistended; Bowel sounds present  EXTREMITIES:  2+ Peripheral Pulses, No clubbing, cyanosis, or edema  PSYCH: AAOx3  NEUROLOGY: non-focal  SKIN: No rashes or lesions    LABS:                        8.2    4.47  )-----------( 93       ( 19 Feb 2020 06:50 )             25.6     02-19    135  |  102  |  9   ----------------------------<  85  3.3<L>   |  23  |  1.48<H>    Ca    8.5      19 Feb 2020 06:50  Phos  2.9     02-19  Mg     1.7     02-19    TPro  6.4  /  Alb  2.3<L>  /  TBili  0.3  /  DBili  x   /  AST  27  /  ALT  18  /  AlkPhos  103  02-19              RADIOLOGY & ADDITIONAL TESTS:    Imaging Personally Reviewed:    Consultant(s) Notes Reviewed:      Care Discussed with Consultants/Other Providers:

## 2020-02-20 NOTE — PROGRESS NOTE ADULT - PROBLEM SELECTOR PROBLEM 6
Hypertension
Prophylactic measure
Hypertension
Prophylactic measure
Hypertension
Prophylactic measure
Hypertension
Hypertension
Prophylactic measure

## 2020-02-20 NOTE — PROGRESS NOTE ADULT - ATTENDING COMMENTS
CM- awaiting [facility]  ID- Iv Zosyn x 10 days or transition to PO Cipro and Flagyl x 9 days for total of 14 days  Awaiting bed at [facility]    40 min to coordinate d/c In patient [place] today  ID- Iv Zosyn x 10 days or transition to PO Cipro and Flagyl x 9 days for total of 14 days  Awaiting bed at [place]  called [facility] [place] [phone]-     40 min to coordinate d/c In patient [place] today  ID- Iv Zosyn x 10 days or transition to PO Cipro and Flagyl x 9 days for total of 14 days  Awaiting bed at [place]  called [facility] [place] [phone]- and updated    40 min to coordinate d/c In patient [place] today  ID- Iv Zosyn x 10 days or transition to PO Cipro and Flagyl x 9 days for total of 14 days  Zosyn started 2/14 and will end at 2/25/2020    40 min to coordinate d/c

## 2020-02-20 NOTE — PROGRESS NOTE ADULT - PROBLEM SELECTOR PROBLEM 3
Malignant neoplasm of uterus, unspecified site
SIRS (systemic inflammatory response syndrome)
Advanced care planning/counseling discussion
Malignant neoplasm of uterus, unspecified site
SIRS (systemic inflammatory response syndrome)
Malignant neoplasm of uterus, unspecified site
SIRS (systemic inflammatory response syndrome)
Malignant neoplasm of uterus, unspecified site
normal...

## 2020-02-20 NOTE — PROGRESS NOTE ADULT - PROBLEM SELECTOR PLAN 2
- appears euvolemic, continue to monitor, avoid nephrotoxic agents  Renal consult appreciated
-Cr=2.37, baseline 1.5=-1.7  -likely pre renal in the setting of acute blood loss anemia  -getting second PRBC now  -s/p 1L IVF in the ED  -start on NS @100cc/hr after blood transfusion  -continue to monitor
- Intermittently nauseous.   - Zofran IV prn. Poorly controlled.
- Intermittently nauseous.   - Zofran IV prn. Poorly controlled.
- Intermittently nauseous.   - Zofran prn added.
- appears euvolemic, continue to monitor, avoid nephrotoxic agents  Renal consult appreciated
- appears euvolemic, continue to monitor, avoid nephrotoxic agents  Renal consult appreciated  Creat stable
- appears euvolemic, continue to monitor, avoid nephrotoxic agents  Renal consult appreciated  Creat stable
-Cr=2.37, baseline 1.5=-1.7------>1.45   ALI improving  -likely pre renal in the setting of acute blood loss anemia  -getting second PRBC now  -s/p 1L IVF in the ED  -start on NS @100cc/hr after blood transfusion  -continue to monitor
-Cr=2.37, baseline 1.5=-1.7------>1.45 ---->1.4  ALI improving  -likely pre renal in the setting of acute blood loss anemia  -getting second PRBC now  -s/p 1L IVF in the ED  -start on NS @100cc/hr after blood transfusion  -continue to monitor
2.37 on admission (1/27/20), downtrended to 1.25, now rising to 1.6 today  - s/p IVF  - s/p contrast on 2/4 for CT scan  - appears euvolemic, continue to monitor, avoid nephrotoxic agents
2.37 on admission (1/27/20), downtrended to 1.25, uptrended to 1.6 now 1.4 today  - appears euvolemic, continue to monitor, avoid nephrotoxic agents  Renal consult appreciated
2.37 on admission (1/27/20), downtrended to 1.25, uptrended to 1.6 now 1.4 today  - s/p IVF  - s/p contrast on 2/4 for CT scan  - appears euvolemic, continue to monitor, avoid nephrotoxic agents
2.37 on admission (1/27/20), downtrended to 1.25, uptrended to 1.6 now 1.5 today  - s/p IVF  - s/p contrast on 2/4 for CT scan  - appears euvolemic, continue to monitor, avoid nephrotoxic agents
-Cr=2.37, baseline 1.5=-1.7------>1.45 ---->1.4  ALI improving  -likely pre renal in the setting of acute blood loss anemia  -getting second PRBC now  -s/p 1L IVF in the ED  -start on NS @100cc/hr after blood transfusion  -continue to monitor
-Cr=2.37, baseline 1.5=-1.7------>1.45 ---->1.4----> 1.25   RESOLVED STEVEN  -likely pre renal in the setting of acute blood loss anemia  -continue to monitor
1.4- ivf hydration  mon bnp in am  -continue to monitor
2.37 on admission (1/27/20), downtrended to 1.25, uptrended to 1.6 now 1.4 today  - appears euvolemic, continue to monitor, avoid nephrotoxic agents  Renal consult appreciated
2.37 on admission (1/27/20), downtrended to 1.25, uptrended to 1.6 now 1.4 today  - s/p IVF  - s/p contrast on 2/4 for CT scan  - appears euvolemic, continue to monitor, avoid nephrotoxic agents
-Cr=2.37, baseline 1.5=-1.7------>1.45 ---->1.4  ALI improving  -likely pre renal in the setting of acute blood loss anemia  -getting second PRBC now  -s/p 1L IVF in the ED  -start on NS @100cc/hr after blood transfusion  -continue to monitor
2.37 on admission (1/27/20), downtrended to 1.25, uptrended to 1.6 now 1.4 today  - s/p IVF  - s/p contrast on 2/4 for CT scan  - appears euvolemic, continue to monitor, avoid nephrotoxic agents
2.37 on admission (1/27/20), downtrended to 1.25, uptrended to 1.6 now 1.4 today  - appears euvolemic, continue to monitor, avoid nephrotoxic agents  Renal consult appreciated

## 2020-02-20 NOTE — PROGRESS NOTE ADULT - PROBLEM SELECTOR PROBLEM 1
Anemia due to blood loss
Malignant neoplasm of uterus, unspecified site
Anemia due to blood loss

## 2020-02-20 NOTE — PROGRESS NOTE ADULT - PROBLEM SELECTOR PROBLEM 4
Constipation
Hypertension
Constipation
Encounter for palliative care
Encounter for palliative care
Hypertension
Malignant neoplasm of uterus, unspecified site
Constipation
Hypertension
Constipation
Constipation
Hypertension

## 2020-02-20 NOTE — PROGRESS NOTE ADULT - PROBLEM SELECTOR PLAN 6
-Hold home losartan as BP soft and STEVEN
DVT PPx  -SCDs for now in setting of bleed
-Hold home losartan as BP soft and STEVEN
DVT PPx  -SCDs for now in setting of bleed
-Hold home losartan as BP soft and STEVEN
DVT PPx  -SCDs for now in setting of bleed
-Hold home losartan as BP soft and STEVEN
-Hold home losartan as BP soft and STEVEN
DVT PPx  -SCDs for now in setting of bleed

## 2020-02-20 NOTE — PROGRESS NOTE ADULT - PROBLEM SELECTOR PLAN 1
-likely 2/2 vaginal bleed in the setting of uterine cancer.   -hgb=5.9, baseline 8-10.   -receiving the second Unit PRBC now  -post transfusion CBC  -maintain active t/s  -Transfuse to keep >7  -f/u GYN recs  -May need rad-onc if bleeding does not side.
-likely 2/2 vaginal bleed in the setting of uterine cancer. 5 sessions of RT completed  -Transfuse to keep >7- getting blood 2/16 1 unit  -IR did embolization on 2/10/2020
- No acute GYN interventions at this time. Patient not a surgical candidate.   - F/u AM CBC  - STEVEN with Cr 2.37 improved to 2.0 after fluid resuscitation   - Recommend daily pad counts   - If significant vaginal bleeding, consider vaginal packing and radiation therapy   - Gyn Onc will continue to follow     ENRRIQUE Soto MD PGY3
- Stable at this time.  - Completed Palliative RT.  - Awaiting IR embolization.
- Stable at this time.  - Patient in the process of receiving Palliative RT.
- Stable at this time.  - Patient in the process of receiving Palliative RT.
-likely 2/2 vaginal bleed in the setting of uterine cancer.   -hgb=5.9, baseline 8-10.   -s/p 2 u prbc Hgb 8.2 on 1/28 but now 7.6 today  Will ask for Rt onc eval- consult requested  -post transfusion CBC  -maintain active t/s  -Transfuse to keep >7  -f/u GYN recs  -May need rad-onc if bleeding does not side.
-likely 2/2 vaginal bleed in the setting of uterine cancer.   -hgb=5.9, baseline 8-10.   -s/p 2 u prbc Hgb 8.2 on 1/28 but now 7.6 today- got PRBC- Hgb 9.6  Will ask for Rt onc eval- consult requested  -Transfuse to keep >7  -Hgb 7.8 TODAY  -May need rad-onc if bleeding does not side.
-likely 2/2 vaginal bleed in the setting of uterine cancer.   Will ask for Rt onc eval- consult requested  -Transfuse to keep >7  - received one unit past 24 hours, hg this AM 7.2, repeat this evening  Completed RT  - was planned to undergo IR embolization but due to rising Cr and stable hg, case cancelled. Cr has since downtrended to 1.4. Will make pt NPO after MN, pending tomorrow AM hg, may need to reconsider IR embolization, f/u IR recs
-likely 2/2 vaginal bleed in the setting of uterine cancer.   Will ask for Rt onc eval- consult requested  -Transfuse to keep >7  - received one unit past 24 hours, hg this AM 7.2, repeat this evening  Completed RT  IR did embolization on 2/10/2020  patient h/h stable- patient wants to speak with hospice RN
-likely 2/2 vaginal bleed in the setting of uterine cancer.   Will ask for Rt onc eval- consult requested  -Transfuse to keep >7  -Hgb 7.3 from 7.7   Completed RT  - was planned to undergo IR embolization but due to rising Cr and stable hg, case cancelled   - continue to monitor hg
-likely 2/2 vaginal bleed in the setting of uterine cancer.   Will ask for Rt onc eval- consult requested  -Transfuse to keep >7  -Hgb 7.9 s/p PRBCs today  -May need rad-onc if bleeding does not side.
-likely 2/2 vaginal bleed in the setting of uterine cancer.   Will ask for Rt onc eval- consult requested  -Transfuse to keep >7  -Hgb 8.4 today from 7.0 yesterday  Completed RT  - was planned to undergo IR embolization today but due to rising Cr and stable hg, case cancelled   - continue to monitor hg
-likely 2/2 vaginal bleed in the setting of uterine cancer. 5 sessions of RT completed  -Transfuse to keep >7  -IR did embolization on 2/10/2020
-likely 2/2 vaginal bleed in the setting of uterine cancer. 5 sessions of RT completed  -Transfuse to keep >7- getting blood 2/16 1 unit  -IR did embolization on 2/10/2020
-likely 2/2 vaginal bleed in the setting of uterine cancer. 5 sessions of RT completed  -Transfuse to keep >7- getting blood 2/16 1 unit  -IR did embolization on 2/10/2020
-likely 2/2 vaginal bleed in the setting of uterine cancer. 5 sessions of RT completed  -Transfuse to keep >7- getting blood 2/16 1 unit  -IR did embolization on 2/10/2020  H/h now stable
-likely 2/2 vaginal bleed in the setting of uterine cancer. 5 sessions of RT completed  -Transfuse to keep >7- getting blood 2/16 1 unit  -IR did embolization on 2/10/2020  H/h now stable
-likely 2/2 vaginal bleed in the setting of uterine cancer.   Will ask for Rt onc eval- consult requested  -Transfuse to keep >7  - received one unit past 24 hours, hg this AM 7.2, repeat this evening  Completed RT  - was planned to undergo IR embolization but due to rising Cr and stable hg, case cancelled. Cr has since downtrended to 1.4. Will make pt NPO after MN, pending tomorrow AM hg, may need to reconsider IR embolization, f/u IR recs
-likely 2/2 vaginal bleed in the setting of uterine cancer.   Will ask for Rt onc eval- consult requested  -Transfuse to keep >7  - received one unit past 24 hours, hg this AM 7.2, repeat this evening  Completed RT  IR did embolization on 2/10/2020  patient h/h stable- patient wants to speak with hospice RN
-likely 2/2 vaginal bleed in the setting of uterine cancer.   Will ask for Rt onc eval- consult requested  -Transfuse to keep >7  -Hgb 7.9 s/p PRBCs today  -May need rad-onc if bleeding does not side.
-likely 2/2 vaginal bleed in the setting of uterine cancer.   Will ask for Rt onc eval- consult requested  -Transfuse to keep >7  -Hgb 7.9 s/p PRBCs today  -May need rad-onc if bleeding does not side.
-likely 2/2 vaginal bleed in the setting of uterine cancer.   Will ask for Rt onc eval- consult requested  -Transfuse to keep >7  -Hgb 7.9 s/p PRBCs today  Completed RT  Ir will asses for ablation
-likely 2/2 vaginal bleed in the setting of uterine cancer.   -hgb=5.9, baseline 8-10.   -s/p 2 u prbc Hgb 8.2 on 1/28 but now 7.6 today- got PRBC- Hgb 9.6  Will ask for Rt onc eval- consult requested  -post transfusion CBC  -maintain active t/s  -Transfuse to keep >7  -f/u GYN recs  -May need rad-onc if bleeding does not side.
-likely 2/2 vaginal bleed in the setting of uterine cancer.   Will ask for Rt onc eval- consult requested  -Transfuse to keep >7  - received one unit past 24 hours, hg this AM 7.2, repeat this evening  Completed RT  - was planned to undergo IR embolization but due to rising Cr and stable hg, case cancelled. Cr has since downtrended to 1.4. Will make pt NPO after MN, pending tomorrow AM hg, may need to reconsider IR embolization, f/u IR recs
-likely 2/2 vaginal bleed in the setting of uterine cancer.   Will ask for Rt onc eval- consult requested  -Transfuse to keep >7  - received one unit past 24 hours, hg this AM 7.2, repeat this evening  Completed RT  IR did embolization on 2/10/2020  patient h/h stable- patient wants to speak with hospice RN

## 2020-02-20 NOTE — PROGRESS NOTE ADULT - REASON FOR ADMISSION
Sent from Onc office for hypotension

## 2020-02-20 NOTE — PROGRESS NOTE ADULT - PROBLEM SELECTOR PLAN 5
-Stage IV uterine cancer on Carboplatin, last dose 12/4.    -s/p C7 with carboplatin/Taxol, now on single agent  -f/u onc recs  -f/u gyn onc recs
Transitions of Care Status:  1.  Name of PCP: Dr. Asiya Garcia  2.  PCP Contacted on Admission: [ ] Y    [x ] N    3.  PCP contacted at Discharge: [X ] Y    [ ] N    [ ] N/A  4.  Post-Discharge Appointment Date and Location:  Dr Garcia will see out patient but Onc also agrees to HOSPICE RN [facility]  5.  Summary of Handoff given to PCP:
- Patient with stage IV uterine cancer.  - Advanced disease.  - Supportive care.  - Ongoing discussions about goals of care and hospice.  - Overall prognosis is guarded.
-Stage IV uterine cancer on Carboplatin, last dose 12/4.    -s/p C7 with carboplatin/Taxol, now on single agent  -ONc rec out patient f/u with Dr KATHRINE Garcia  -f/u gyn onc recs
-Stage IV uterine cancer on Carboplatin, last dose 12/4.    -s/p C7 with carboplatin/Taxol, now on single agent  -ONc rec palliative consult since bleed is not stopping  -f/u gyn onc recs
-Stage IV uterine cancer on Carboplatin, last dose 12/4.    -s/p C7 with carboplatin/Taxol, now on single agent  -f/u onc recs  -f/u gyn onc recs
Transitions of Care Status:  1.  Name of PCP: Dr. Asiya Garcia  2.  PCP Contacted on Admission: [ ] Y    [x ] N    3.  PCP contacted at Discharge: [X ] Y    [ ] N    [ ] N/A  4.  Post-Discharge Appointment Date and Location:  Dr Garcia will see out patient but Onc also agrees to HOSPICE RN [facility]  5.  Summary of Handoff given to PCP:
-Stage IV uterine cancer on Carboplatin, last dose 12/4.    -s/p C7 with carboplatin/Taxol, now on single agent  -ONc rec out patient f/u with Dr KATHRINE Garcia  -f/u gyn onc recs
-Stage IV uterine cancer on Carboplatin, last dose 12/4.    -s/p C7 with carboplatin/Taxol, now on single agent  -ONc rec palliative consult since bleed is not stopping  -f/u gyn onc recs
Transitions of Care Status:  1.  Name of PCP: Dr. Asiya Garcia  2.  PCP Contacted on Admission: [ ] Y    [x ] N    3.  PCP contacted at Discharge: [X ] Y    [ ] N    [ ] N/A  4.  Post-Discharge Appointment Date and Location:  Dr Garcia will see out patient but Onc also agrees to HOSPICE RN [facility]  5.  Summary of Handoff given to PCP:
-Stage IV uterine cancer on Carboplatin, last dose 12/4.    -s/p C7 with carboplatin/Taxol, now on single agent  -ONc rec palliative consult since bleed is not stopping  -f/u gyn onc recs
-Stage IV uterine cancer on Carboplatin, last dose 12/4.    -s/p C7 with carboplatin/Taxol, now on single agent  -f/u onc recs  -f/u gyn onc recs
Transitions of Care Status:  1.  Name of PCP: Dr. Asiya Garcia  2.  PCP Contacted on Admission: [ ] Y    [x ] N    3.  PCP contacted at Discharge: [X ] Y    [ ] N    [ ] N/A  4.  Post-Discharge Appointment Date and Location:  Dr Garcia will see out patient but Onc also agrees to HOSPICE RN [facility]  5.  Summary of Handoff given to PCP:

## 2020-12-16 NOTE — CHART NOTE - NSCHARTNOTEFT_GEN_A_CORE
[facility] GYN ONC Chart Note    Pt seen and evaluated at bedside approx 12p today.  Pt reports VB has resolved and she now has minimal brown discharge that is malodorous.  Explained to pt that this is expected given her disease burden and in the setting of recent IR embolization and that no intervention will prevent or correct it.  Pt verbalized understanding.      d/w Dr. Marcellus Soto MD PGY3 [FreeTextEntry1] : Patient here for Shingrix Vaccine only. [de-identified] : Patient states her Transplant team strongly advised her to obtain the vaccine. [facility] GYN ONC Chart Note    Pt seen and evaluated at bedside approx 12p today.  Pt reports VB has resolved and she now has minimal brown discharge that is malodorous.  Explained to pt that this is expected given her disease burden and in the setting of recent IR embolization and is to be expected postprocedure.  Pt verbalized understanding.      d/w Dr. Marcellus Soto MD PGY3

## 2020-12-21 PROBLEM — Z87.440 HISTORY OF URINARY TRACT INFECTION: Status: RESOLVED | Noted: 2019-03-18 | Resolved: 2020-12-21

## 2024-02-14 NOTE — DIETITIAN INITIAL EVALUATION ADULT. - PROBLEM SELECTOR PROBLEM 5
Per Dr. Mitchell, Echo will be discussed with Pt at upcoming OV
Please review Echo. Can this wait for upcoming OV 2/19/24?
Malignant neoplasm of uterus, unspecified site

## 2024-10-14 NOTE — PROGRESS NOTE ADULT - SUBJECTIVE AND OBJECTIVE BOX
IMMEDIATE CARE NOTE      Patient: Lori Paulino Date of Service: 10/14/2024   : 1999 MRN: [id number]     SUBJECTIVE:     HISTORY OF PRESENT ILLNESS:  Lori Paulino is a new 25 year old female who presents today for   Chief Complaint   Patient presents with    Ear Pain     RT ear pain x3 days      Patient complains of right side ear pain, tenderness right ear x 3 days. Denies fever, chills, fatigue.   Has tried OTC garlic clove to the affected area.  Rates pain as 4 out of 10.  Has not been exposed to others with similar symptoms.   Patient is 12 weeks pregnant.          ALLERGIES:  No Known Allergies    Past Medical History:   Diagnosis Date    No known problems        Current Outpatient Medications   Medication Sig    Prenatal Vit-Fe Fumarate-FA (multivitamin & mineral w/folic acid- PRENATAL) 27-1 MG Tab Take 1 tablet by mouth daily.     No current facility-administered medications for this visit.       Past Surgical History:   Procedure Laterality Date    No past surgeries         Social History     Tobacco Use    Smoking status: Never     Passive exposure: Never    Smokeless tobacco: Never   Vaping Use    Vaping status: never used   Substance Use Topics    Alcohol use: Not Currently    Drug use: Never       Review of Systems   Constitutional:  Negative for activity change, chills, fatigue and fever.   HENT:  Positive for ear pain.    Respiratory:  Negative for apnea, cough, chest tightness, shortness of breath and wheezing.    Cardiovascular:  Negative for chest pain.   Gastrointestinal:  Negative for constipation, diarrhea, nausea and vomiting.   Skin:  Negative for color change, pallor and rash.   Neurological:  Negative for dizziness and headaches.   All other systems reviewed and are negative.      OBJECTIVE:       Visit Vitals  /72 (BP Location: RUE - Right upper extremity, Patient Position: Sitting, Cuff Size: Regular)   Pulse 97   Temp 99 °F (37.2 °C) (Tympanic)   Resp 18   Ht 5' 6\" (1.676  m)   Wt 88.5 kg (195 lb)   LMP 07/26/2024 (Approximate)   SpO2 98%   BMI 31.47 kg/m²       Physical Exam  Vitals reviewed.   Constitutional:       Appearance: She is well-developed.   HENT:      Head: Normocephalic and atraumatic.      Right Ear: Hearing and external ear normal. Swelling and tenderness present. Tympanic membrane is erythematous and bulging.      Left Ear: Hearing, tympanic membrane, ear canal and external ear normal.      Nose: Nose normal.      Mouth/Throat:      Lips: Pink.      Mouth: Mucous membranes are moist.      Pharynx: Oropharynx is clear. Uvula midline.      Neck: Full passive range of motion without pain, normal range of motion and neck supple.   Eyes:      General: Lids are normal. Vision grossly intact. Gaze aligned appropriately.      Extraocular Movements: Extraocular movements intact.      Conjunctiva/sclera: Conjunctivae normal.      Pupils: Pupils are equal, round, and reactive to light.   Neck:      Trachea: Trachea and phonation normal.   Cardiovascular:      Rate and Rhythm: Normal rate and regular rhythm.      Pulses: Normal pulses.      Heart sounds: Normal heart sounds.   Pulmonary:      Effort: Pulmonary effort is normal.      Breath sounds: Normal breath sounds and air entry.   Musculoskeletal:         General: Normal range of motion.   Skin:     General: Skin is warm and dry.   Neurological:      General: No focal deficit present.      Mental Status: She is alert and oriented to person, place, and time.      Cranial Nerves: Cranial nerves 2-12 are intact.      Deep Tendon Reflexes: Reflexes are normal and symmetric.         DIAGNOSTIC STUDIES:   LAB RESULTS:    No results found for this visit on 10/14/24.      ASSESSMENT AND PLAN:     Problem List Items Addressed This Visit    None  Visit Diagnoses       Nonsuppurative otitis media of right ear    -  Primary    Relevant Medications    amoxicillin-clavulanate (AUGMENTIN) 875-125 MG per tablet    Otitis externa of right  ear, unspecified chronicity, unspecified type        Relevant Medications    ciprofloxacin-dexamethasone (CIPRODEX) otic suspension            * Advised pt to follow up with PCP as directed.  If no PCP, recommended that he/she establish care with an [facility] Provider by calling [phone]   * Instructions provided as documented in the AVS.  * Discussed with patient (and/or parent/caregiver) findings on exam.  Discussed supportive care and treatment plan.  If symptoms are not getting better or if the patient feels like symptoms are getting worse, recommend they go for further evaluation with their primary care doctor or ER.  Patient (or parent/caregiver) agrees with and verbalizes understanding of the plan of care.   Patient is a 69y old  Female who presents with a chief complaint of Sent from Onc office for hypotension (18 Feb 2020 09:46)      SUBJECTIVE / OVERNIGHT EVENTS:  Resting in bed- no complaints    MEDICATIONS  (STANDING):  chlorhexidine 4% Liquid 1 Application(s) Topical daily  gabapentin 300 milliGRAM(s) Oral two times a day  piperacillin/tazobactam IVPB.. 3.375 Gram(s) IV Intermittent every 8 hours  polyethylene glycol 3350 17 Gram(s) Oral daily    MEDICATIONS  (PRN):  acetaminophen   Tablet .. 650 milliGRAM(s) Oral every 6 hours PRN Temp greater or equal to 38C (100.4F), Mild Pain (1 - 3), Moderate Pain (4 - 6)  acetaminophen  IVPB .. 1000 milliGRAM(s) IV Intermittent once PRN Mild Pain (1 - 3)  bisacodyl 5 milliGRAM(s) Oral every 12 hours PRN Constipation  ondansetron Injectable 4 milliGRAM(s) IV Push every 8 hours PRN Nausea and/or Vomiting  oxyCODONE    IR 5 milliGRAM(s) Oral every 3 hours PRN Severe Pain (7 - 10)      I&O's Summary    18 Feb 2020 07:01  -  18 Feb 2020 11:57  --------------------------------------------------------  IN: 480 mL / OUT: 0 mL / NET: 480 mL      Vital Signs Last 24 Hrs  T(C): 36.7 (18 Feb 2020 05:25), Max: 37.7 (17 Feb 2020 21:38)  T(F): 98 (18 Feb 2020 05:25), Max: 99.9 (17 Feb 2020 21:38)  HR: 83 (18 Feb 2020 05:25) (83 - 89)  BP: 135/81 (18 Feb 2020 05:25) (121/75 - 138/86)  BP(mean): --  RR: 18 (18 Feb 2020 05:25) (18 - 20)  SpO2: 98% (18 Feb 2020 05:25) (95% - 100%)  PHYSICAL EXAM:    GENERAL: NAD, well-developed  HEAD:  Atraumatic, Normocephalic  EYES: EOMI, PERRLA, conjunctiva and sclera clear  NECK: Supple, No JVD  CHEST/LUNG: Clear to auscultation bilaterally; No wheeze  r chest port  HEART: Regular rate and rhythm; No murmurs, rubs, or gallops  ABDOMEN: Soft, Nontender, Nondistended; Bowel sounds present  EXTREMITIES:  2+ Peripheral Pulses, No clubbing, cyanosis, or edema  PSYCH: AAOx3  NEUROLOGY: non-focal  SKIN: No rashes or lesions    LABS:                        8.6    5.29  )-----------( 98       ( 18 Feb 2020 06:30 )             26.3     02-18    139  |  105  |  7   ----------------------------<  84  3.6   |  23  |  1.48<H>    Ca    8.7      18 Feb 2020 06:30  Phos  2.5     02-18  Mg     1.9     02-18    TPro  6.6  /  Alb  2.5<L>  /  TBili  0.4  /  DBili  x   /  AST  19  /  ALT  14  /  AlkPhos  110  02-18          Care Discussed with Consultants/Other Providers:  CM- awaiting [place]  ID- Iv Zosyn x 10 days or transition to PO Cipro and Flagyl x 9 days for total of 14 days

## 2025-03-19 NOTE — PROGRESS NOTE ADULT - PROBLEM SELECTOR PLAN 1
Problem: Adult Inpatient Plan of Care  Goal: Absence of Hospital-Acquired Illness or Injury  Outcome: Progressing  Intervention: Identify and Manage Fall Risk  Recent Flowsheet Documentation  Taken 3/19/2025 0400 by Jaswinder Lentz RN  Safety Promotion/Fall Prevention:   safety round/check completed   room organization consistent   nonskid shoes/slippers when out of bed   lighting adjusted   fall prevention program maintained   clutter free environment maintained   assistive device/personal items within reach  Taken 3/19/2025 0200 by Jaswinder Lentz RN  Safety Promotion/Fall Prevention:   safety round/check completed   room organization consistent   nonskid shoes/slippers when out of bed   lighting adjusted   fall prevention program maintained   clutter free environment maintained   assistive device/personal items within reach  Taken 3/19/2025 0032 by Jaswinder Lentz RN  Safety Promotion/Fall Prevention:   safety round/check completed   room organization consistent   nonskid shoes/slippers when out of bed   lighting adjusted   fall prevention program maintained   clutter free environment maintained   assistive device/personal items within reach  Taken 3/18/2025 2200 by Jaswinder Lentz RN  Safety Promotion/Fall Prevention:   safety round/check completed   room organization consistent   nonskid shoes/slippers when out of bed   lighting adjusted   fall prevention program maintained   assistive device/personal items within reach   clutter free environment maintained  Taken 3/18/2025 2010 by Jaswinder Lentz, RN  Safety Promotion/Fall Prevention:   safety round/check completed   room organization consistent   nonskid shoes/slippers when out of bed   lighting adjusted   fall prevention program maintained   assistive device/personal items within reach   clutter free environment maintained  Intervention: Prevent Skin Injury  Recent Flowsheet Documentation  Taken 3/19/2025 0400 by  Jaswinder Lentz RN  Body Position: position changed independently  Taken 3/19/2025 0200 by Jaswinder Lentz RN  Body Position: position changed independently  Taken 3/19/2025 0032 by Jaswinder Lentz RN  Body Position: position changed independently  Skin Protection:   incontinence pads utilized   transparent dressing maintained  Taken 3/18/2025 2200 by Jaswinder Lentz RN  Body Position: position changed independently  Taken 3/18/2025 2010 by Jaswinder Lentz RN  Body Position: position changed independently  Skin Protection:   incontinence pads utilized   transparent dressing maintained  Intervention: Prevent and Manage VTE (Venous Thromboembolism) Risk  Recent Flowsheet Documentation  Taken 3/19/2025 0032 by Jaswinder Lentz RN  VTE Prevention/Management: (Lovenox) other (see comments)  Taken 3/18/2025 2010 by Jaswinder Lentz RN  VTE Prevention/Management: (Lovenox) other (see comments)  Intervention: Prevent Infection  Recent Flowsheet Documentation  Taken 3/19/2025 0400 by Jaswinder Lentz RN  Infection Prevention:   rest/sleep promoted   personal protective equipment utilized   hand hygiene promoted   environmental surveillance performed  Taken 3/19/2025 0200 by Jaswinder Lentz RN  Infection Prevention:   rest/sleep promoted   personal protective equipment utilized   hand hygiene promoted   environmental surveillance performed  Taken 3/19/2025 0032 by Jaswinder Lentz RN  Infection Prevention:   rest/sleep promoted   personal protective equipment utilized   hand hygiene promoted   environmental surveillance performed  Taken 3/18/2025 2200 by Jaswinder Lentz RN  Infection Prevention:   rest/sleep promoted   personal protective equipment utilized   hand hygiene promoted   environmental surveillance performed  Taken 3/18/2025 2010 by Jaswinder Lentz RN  Infection Prevention:   rest/sleep promoted   personal  protective equipment utilized   hand hygiene promoted   environmental surveillance performed   Goal Outcome Evaluation:  Plan of Care Reviewed With: patient        Progress: no change  Outcome Evaluation: AOx4 sometimes forgetful, SR on telemetry monitor, O2 at 9 lpm via CPAP, c/o pain and nausea -PRN meds given, called Dr. Lemus about patient's blood sugar -half dose of Lantus given, cardiology consult done as ordered by attending, bed alarm on, call light within reach.                              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
